# Patient Record
Sex: MALE | Race: WHITE | NOT HISPANIC OR LATINO | Employment: FULL TIME | URBAN - METROPOLITAN AREA
[De-identification: names, ages, dates, MRNs, and addresses within clinical notes are randomized per-mention and may not be internally consistent; named-entity substitution may affect disease eponyms.]

---

## 2023-06-01 LAB
ALANINE AMINOTRANSFERASE (SGPT) (U/L) IN SER/PLAS: 65 U/L (ref 10–52)
ALBUMIN (G/DL) IN SER/PLAS: 4.2 G/DL (ref 3.4–5)
ALKALINE PHOSPHATASE (U/L) IN SER/PLAS: 60 U/L (ref 33–136)
ANION GAP IN SER/PLAS: 12 MMOL/L (ref 10–20)
ASPARTATE AMINOTRANSFERASE (SGOT) (U/L) IN SER/PLAS: 50 U/L (ref 9–39)
BILIRUBIN TOTAL (MG/DL) IN SER/PLAS: 1.8 MG/DL (ref 0–1.2)
CALCIUM (MG/DL) IN SER/PLAS: 9.2 MG/DL (ref 8.6–10.3)
CARBON DIOXIDE, TOTAL (MMOL/L) IN SER/PLAS: 30 MMOL/L (ref 21–32)
CERULOPLASMIN (MG/DL) IN SER/PLAS: 18 MG/DL (ref 20–60)
CHLORIDE (MMOL/L) IN SER/PLAS: 100 MMOL/L (ref 98–107)
CREATININE (MG/DL) IN SER/PLAS: 1.02 MG/DL (ref 0.5–1.3)
ERYTHROCYTE DISTRIBUTION WIDTH (RATIO) BY AUTOMATED COUNT: 12.5 % (ref 11.5–14.5)
ERYTHROCYTE MEAN CORPUSCULAR HEMOGLOBIN CONCENTRATION (G/DL) BY AUTOMATED: 34 G/DL (ref 32–36)
ERYTHROCYTE MEAN CORPUSCULAR VOLUME (FL) BY AUTOMATED COUNT: 96 FL (ref 80–100)
ERYTHROCYTES (10*6/UL) IN BLOOD BY AUTOMATED COUNT: 5.07 X10E12/L (ref 4.5–5.9)
FERRITIN (UG/LL) IN SER/PLAS: 441 UG/L (ref 20–300)
GFR MALE: 83 ML/MIN/1.73M2
GLUCOSE (MG/DL) IN SER/PLAS: 72 MG/DL (ref 74–99)
HEMATOCRIT (%) IN BLOOD BY AUTOMATED COUNT: 48.5 % (ref 41–52)
HEMOGLOBIN (G/DL) IN BLOOD: 16.5 G/DL (ref 13.5–17.5)
HEPATITIS A TOTAL AB INTERPRETATION: REACTIVE
HEPATITIS B VIRUS SURFACE AB (MIU/ML) IN SERUM: <3.1 MIU/ML
HEPATITIS B VIRUS SURFACE AG PRESENCE IN SERUM: NONREACTIVE
HEPATITIS C VIRUS AB PRESENCE IN SERUM: NONREACTIVE
INR IN PPP BY COAGULATION ASSAY: 1.6 (ref 0.9–1.1)
IRON (UG/DL) IN SER/PLAS: 197 UG/DL (ref 35–150)
IRON BINDING CAPACITY (UG/DL) IN SER/PLAS: 315 UG/DL (ref 240–445)
IRON SATURATION (%) IN SER/PLAS: 63 % (ref 25–45)
LEUKOCYTES (10*3/UL) IN BLOOD BY AUTOMATED COUNT: 6.5 X10E9/L (ref 4.4–11.3)
PLATELETS (10*3/UL) IN BLOOD AUTOMATED COUNT: 236 X10E9/L (ref 150–450)
POTASSIUM (MMOL/L) IN SER/PLAS: 3.8 MMOL/L (ref 3.5–5.3)
PROTEIN TOTAL: 6.8 G/DL (ref 6.4–8.2)
PROTHROMBIN TIME (PT) IN PPP BY COAGULATION ASSAY: 19.1 SEC (ref 9.8–13.4)
SODIUM (MMOL/L) IN SER/PLAS: 138 MMOL/L (ref 136–145)
UREA NITROGEN (MG/DL) IN SER/PLAS: 30 MG/DL (ref 6–23)

## 2023-06-02 LAB
ANTI-NUCLEAR ANTIBODY (ANA): NEGATIVE
ANTI-SMOOTH MUSCLE ANTIBODY: NEGATIVE
MITOCHONDRIAL ANTIBODY: NEGATIVE

## 2023-06-05 LAB — HEMOCHROMATOSIS INTERPRETATION: NORMAL

## 2023-06-06 LAB
ALPHA-1 ANTITRYPSIN PHENOTYPE: NORMAL
ALPHA-1-ANTITRYPSIN (REF): 131 MG/DL (ref 90–200)

## 2023-08-29 PROBLEM — J45.909 ASTHMA (HHS-HCC): Status: ACTIVE | Noted: 2023-08-29

## 2023-08-29 PROBLEM — R60.0 BILATERAL EDEMA OF LOWER EXTREMITY: Status: ACTIVE | Noted: 2023-08-29

## 2023-08-29 PROBLEM — R20.0 RIGHT ARM NUMBNESS: Status: ACTIVE | Noted: 2023-08-29

## 2023-08-29 PROBLEM — R29.818 SUSPECTED SLEEP APNEA: Status: ACTIVE | Noted: 2023-08-29

## 2023-08-29 PROBLEM — R79.89 ELEVATED LIVER FUNCTION TESTS: Status: ACTIVE | Noted: 2023-08-29

## 2023-08-29 PROBLEM — D49.2 ABNORMAL SKIN GROWTH: Status: ACTIVE | Noted: 2023-08-29

## 2023-08-29 PROBLEM — R60.9 EDEMA: Status: ACTIVE | Noted: 2023-08-29

## 2023-08-29 PROBLEM — E29.1 TESTICULAR HYPOFUNCTION: Status: ACTIVE | Noted: 2023-08-29

## 2023-08-29 PROBLEM — I50.22 CHRONIC SYSTOLIC (CONGESTIVE) HEART FAILURE (MULTI): Status: ACTIVE | Noted: 2023-08-29

## 2023-08-29 PROBLEM — J45.901 ASTHMA EXACERBATION (HHS-HCC): Status: ACTIVE | Noted: 2023-08-29

## 2023-08-29 PROBLEM — I42.9 CARDIOMYOPATHY (MULTI): Status: ACTIVE | Noted: 2023-08-29

## 2023-08-29 PROBLEM — R17 ELEVATED BILIRUBIN: Status: ACTIVE | Noted: 2023-08-29

## 2023-08-29 PROBLEM — I48.91 ATRIAL FIBRILLATION (MULTI): Status: ACTIVE | Noted: 2023-08-29

## 2023-08-29 PROBLEM — R60.0 EDEMA OF RIGHT LOWER EXTREMITY: Status: ACTIVE | Noted: 2023-08-29

## 2023-08-29 PROBLEM — I49.9 IRREGULAR HEART RATE: Status: ACTIVE | Noted: 2023-08-29

## 2023-08-29 PROBLEM — M25.562 ARTHRALGIA OF LEFT KNEE: Status: ACTIVE | Noted: 2023-08-29

## 2023-08-29 PROBLEM — G56.03 BILATERAL CARPAL TUNNEL SYNDROME: Status: ACTIVE | Noted: 2023-08-29

## 2023-08-29 PROBLEM — R35.1 NOCTURIA: Status: ACTIVE | Noted: 2023-08-29

## 2023-08-29 PROBLEM — R94.31 ABNORMAL EKG: Status: ACTIVE | Noted: 2023-08-29

## 2023-08-29 PROBLEM — R06.02 SHORTNESS OF BREATH ON EXERTION: Status: ACTIVE | Noted: 2023-08-29

## 2023-08-29 RX ORDER — ALBUTEROL SULFATE 90 UG/1
AEROSOL, METERED RESPIRATORY (INHALATION)
COMMUNITY
Start: 2022-06-24

## 2023-08-29 RX ORDER — PANTOPRAZOLE SODIUM 40 MG/1
40 TABLET, DELAYED RELEASE ORAL DAILY
COMMUNITY
End: 2023-10-06 | Stop reason: ALTCHOICE

## 2023-08-29 RX ORDER — FUROSEMIDE 80 MG/1
1 TABLET ORAL DAILY
COMMUNITY
Start: 2022-07-14

## 2023-08-29 RX ORDER — IBUPROFEN 200 MG
4 TABLET ORAL DAILY
COMMUNITY

## 2023-08-29 RX ORDER — CARVEDILOL 12.5 MG/1
1 TABLET ORAL
COMMUNITY
Start: 2022-07-19 | End: 2023-10-06 | Stop reason: SDUPTHER

## 2023-08-29 RX ORDER — FLUTICASONE PROPIONATE AND SALMETEROL 100; 50 UG/1; UG/1
1 POWDER RESPIRATORY (INHALATION) 2 TIMES DAILY
COMMUNITY
Start: 2022-06-23 | End: 2023-10-06 | Stop reason: ALTCHOICE

## 2023-08-29 RX ORDER — ASPIRIN 81 MG/1
1 TABLET ORAL DAILY
COMMUNITY
Start: 2022-06-23

## 2023-08-29 RX ORDER — MONTELUKAST SODIUM 10 MG/1
1 TABLET ORAL NIGHTLY
COMMUNITY
Start: 2022-06-23 | End: 2023-10-06 | Stop reason: ALTCHOICE

## 2023-08-29 RX ORDER — LOSARTAN POTASSIUM 25 MG/1
25 TABLET ORAL DAILY
COMMUNITY
End: 2023-11-22 | Stop reason: SDUPTHER

## 2023-10-02 NOTE — PROGRESS NOTES
Counseling:  The patient was counseled regarding diagnostic results, instructions for management, risk factor reductions, prognosis, patient and family education, impressions, risks and benefits of treatment options and importance of compliance with treatment.      Chief Complaint:   The patient presents today for 4-month followup of a-fib and heart failure s/p echocardiogram.      History Of Present Illness:    Domingo Greer is a 62 year old male patient who presents today for 4-month followup of a-fib and heart failure s/p echocardiogram. 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 Echocardiogram performed 09/25/2023 demonstrated an EF of 45-50% and LV strain of -12.8%. Over the past 4 months, the patient states that he has done well from a cardiac standpoint. He denies any CP, chest discomfort, SOB, LE edema or palpitations. EKG today shows NSR with no acute changes.      Last Recorded Vitals:  Vitals:    10/06/23 1341   BP: 130/70   BP Location: Left arm   Pulse: 65   Weight: 118 kg (259 lb 3.2 oz)   Height: 1.829 m (6')       Past Surgical History:  He has a past surgical history that includes Rotator cuff repair (11/13/2013); Cholecystectomy (11/13/2013); Ankle surgery (11/13/2013); and Other surgical history (09/23/2022).      Social History:  He reports that he has quit smoking. His smoking use included cigarettes. He has never used smokeless tobacco. He reports current alcohol use of about 2.0 standard drinks of alcohol per week. He reports that he does not use drugs.    Family History:  Family History   Problem Relation Name Age of Onset    Hypertension Mother      Prostate cancer Father       Allergies:  Other and Penicillin    Outpatient Medications:  Current Outpatient Medications   Medication Instructions    albuterol 90 mcg/actuation inhaler inhale 1 to 2 puffs by mouth every 4  to 6 hours if needed    aspirin 81 mg EC tablet 1 tablet, oral, Daily    carvedilol (Coreg) 12.5 mg tablet 1 tablet, oral, 2 times daily with meals    furosemide (Lasix) 80 mg tablet 1 tablet, oral, Daily    ibuprofen 200 mg tablet 4 tablets, oral, Daily    losartan (COZAAR) 25 mg, oral, Daily     Review of Systems   All other systems reviewed and are negative.     Physical Exam:  Constitutional:       Appearance: Healthy appearance. Not in distress.   Neck:      Vascular: No JVR. JVD normal.   Pulmonary:      Effort: Pulmonary effort is normal.      Breath sounds: Normal breath sounds. No wheezing. No rhonchi. No rales.   Chest:      Chest wall: Not tender to palpatation.   Cardiovascular:      PMI at left midclavicular line. Normal rate. Regular rhythm. Normal S1. Normal S2.       Murmurs: There is no murmur.      No gallop.  No click. No rub.   Pulses:     Intact distal pulses.   Edema:     Peripheral edema absent.   Abdominal:      General: Bowel sounds are normal.      Palpations: Abdomen is soft.      Tenderness: There is no abdominal tenderness.   Musculoskeletal: Normal range of motion.         General: No tenderness. Skin:     General: Skin is warm and dry.   Neurological:      General: No focal deficit present.      Mental Status: Alert and oriented to person, place and time.       Last Labs:  CBC -  Lab Results   Component Value Date    WBC 6.5 06/01/2023    HGB 16.5 06/01/2023    HCT 48.5 06/01/2023    MCV 96 06/01/2023     06/01/2023       CMP -  Lab Results   Component Value Date    CALCIUM 9.2 06/01/2023    PROT 6.8 06/01/2023    ALBUMIN 4.2 06/01/2023    AST 50 (H) 06/01/2023    ALT 65 (H) 06/01/2023    ALKPHOS 60 06/01/2023    BILITOT 1.8 (H) 06/01/2023       LIPID PANEL -   Lab Results   Component Value Date    CHOL 135 06/23/2022    TRIG 49 06/23/2022    HDL 28.5 (A) 06/23/2022    CHHDL 4.7 06/23/2022    LDLF 97 06/23/2022    VLDL 10 06/23/2022       RENAL FUNCTION PANEL -   Lab Results    Component Value Date    GLUCOSE 72 (L) 06/01/2023     06/01/2023    K 3.8 06/01/2023     06/01/2023    CO2 30 06/01/2023    ANIONGAP 12 06/01/2023    BUN 30 (H) 06/01/2023    CREATININE 1.02 06/01/2023    GFRMALE 83 06/01/2023    CALCIUM 9.2 06/01/2023    ALBUMIN 4.2 06/01/2023        Lab Results   Component Value Date    HGBA1C 5.8 (A) 06/23/2022       Last Cardiology Tests:  09/25/2023 - TTE  1. Left ventricular systolic function is mildly decreased with a 45-50% estimated ejection fraction.  2. Moderately increased left ventricular septal thickness.  3. Left Ventricular Global Longitudinal Strain - 12.8 %.    10/21/2022 - Electrophysiology Procedure   RFA.    07/29/2022 - Cardiac Catheterization (LH/RH)  1. No evidence of coronary artery disease.  2. Left Ventricular end-diastolic pressure = 16.  3. Normal LV filling pressures.    07/18/2022 - TTE  1. The left ventricular systolic function is severely decreased with a 30-35% estimated ejection fraction.  2. Severely enlarged right ventricle.  3. The left atrium is moderate to severely dilated.  4. The right atrium is moderately dilated.  5. Mildly elevated RVSP.  6. Mild aortic valve stenosis.  7. The left ventricular cavity size is moderately dilated.  8. There is global hypokinesis of the left ventricle with minor regional variations.     Diagnostic review: I have personally reviewed the result(s) of the Echocardiogram.    Assessment/Plan   1) Atrial fibrillation s/p RFA Oct 2022  Echo Sept 2023 with LVEF 45-50%  ? rate related CMP  [id number] score is 2  On Coreg 25 mg BID (increased from 12.5 mg BID on 10/06/2023)  Message sent to Dr. Yeager Re: Xarelto   Xarelto subsequently discontinued   Denies palpitations   In NSR    2) Chronic systolic heart failure  Appears compensated on exam  On Lasix 80 mg daily, Coreg 12.5 mg BID, losartan 25 mg daily  Left heart cath with no significant CAD July 2022  If LVEF remains <35%, EP to consider ICD   TTE  09/25/2023 with LVEF 45-50%  Denies CP, chest discomfort, SOB or LE edema  Increase carvedilol to 25 mg BID  Repeat echo 6-8 weeks  Check CBC, CMP, BNP, Lipid Panel        Scribe Attestation  By signing my name below, I, Mary Bowman, Scribe   attest that this documentation has been prepared under the direction and in the presence of Venancio Brooks MD.

## 2023-10-02 NOTE — PATIENT INSTRUCTIONS
Please increase the carvedilol to 25 mg twice daily. If you have any of the 12.5 mg tablets left, you can take 2 tablets twice daily until they are finished. A prescription for the new dose has been sent to your pharmacy.  Continue all other medications as prescribed.  Repeat echocardiogram (ultrasound of the heart) in 2 months to followup on your heart function.  Please have blood work drawn at your earliest convenience.   Followup with Dr. Brooks in 3 months.     If you have any questions or cardiac concerns, please call our office at [phone].

## 2023-10-06 ENCOUNTER — OFFICE VISIT (OUTPATIENT)
Dept: CARDIOLOGY | Facility: CLINIC | Age: 63
End: 2023-10-06
Payer: COMMERCIAL

## 2023-10-06 VITALS
HEART RATE: 65 BPM | HEIGHT: 72 IN | BODY MASS INDEX: 35.11 KG/M2 | WEIGHT: 259.2 LBS | SYSTOLIC BLOOD PRESSURE: 130 MMHG | DIASTOLIC BLOOD PRESSURE: 70 MMHG

## 2023-10-06 DIAGNOSIS — I48.0 PAROXYSMAL ATRIAL FIBRILLATION (MULTI): ICD-10-CM

## 2023-10-06 DIAGNOSIS — I42.0 DILATED CARDIOMYOPATHY (MULTI): Primary | ICD-10-CM

## 2023-10-06 PROCEDURE — 1036F TOBACCO NON-USER: CPT | Performed by: INTERNAL MEDICINE

## 2023-10-06 PROCEDURE — 93000 ELECTROCARDIOGRAM COMPLETE: CPT | Performed by: INTERNAL MEDICINE

## 2023-10-06 PROCEDURE — 99213 OFFICE O/P EST LOW 20 MIN: CPT | Performed by: INTERNAL MEDICINE

## 2023-10-06 RX ORDER — CARVEDILOL 25 MG/1
25 TABLET ORAL
COMMUNITY
Start: 2023-09-28 | End: 2023-10-06 | Stop reason: ALTCHOICE

## 2023-10-06 RX ORDER — CARVEDILOL 25 MG/1
25 TABLET ORAL
Qty: 180 TABLET | Refills: 3 | Status: SHIPPED | OUTPATIENT
Start: 2023-10-06 | End: 2023-11-24 | Stop reason: SDUPTHER

## 2023-10-06 ASSESSMENT — ENCOUNTER SYMPTOMS
DEPRESSION: 0
LOSS OF SENSATION IN FEET: 1
OCCASIONAL FEELINGS OF UNSTEADINESS: 0

## 2023-10-06 NOTE — LETTER
October 6, 2023     FATUMA Carlson-CNP  [address] [facility] [address]    Patient: Domingo Greer   YOB: 1960   Date of Visit: 10/6/2023       Dear Dr. India Villagran, FATUMA-CNP:    Thank you for referring Domingo Greer to me for evaluation. Below are my notes for this consultation.  If you have questions, please do not hesitate to call me. I look forward to following your patient along with you.       Sincerely,     Venancio Brooks MD      CC: No Recipients  ______________________________________________________________________________________    Counseling:  The patient was counseled regarding diagnostic results, instructions for management, risk factor reductions, prognosis, patient and family education, impressions, risks and benefits of treatment options and importance of compliance with treatment.      Chief Complaint:   The patient presents today for 4-month followup of a-fib and heart failure s/p echocardiogram.      History Of Present Illness:    Domingo Greer is a 62 year old male patient who presents today for 4-month followup of a-fib and heart failure s/p echocardiogram. 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 Echocardiogram performed 09/25/2023 demonstrated an EF of 45-50% and LV strain of -12.8%. Over the past 4 months, the patient states that he has done well from a cardiac standpoint. He denies any CP, chest discomfort, SOB, LE edema or palpitations. EKG today shows NSR with no acute changes.      Last Recorded Vitals:  Vitals:    10/06/23 1341   BP: 130/70   BP Location: Left arm   Pulse: 65   Weight: 118 kg (259 lb 3.2 oz)   Height: 1.829 m (6')       Past Surgical History:  He has a past surgical history that includes Rotator cuff repair (11/13/2013); Cholecystectomy (11/13/2013); Ankle surgery (11/13/2013); and Other surgical  history (09/23/2022).      Social History:  He reports that he has quit smoking. His smoking use included cigarettes. He has never used smokeless tobacco. He reports current alcohol use of about 2.0 standard drinks of alcohol per week. He reports that he does not use drugs.    Family History:  Family History   Problem Relation Name Age of Onset   • Hypertension Mother     • Prostate cancer Father       Allergies:  Other and Penicillin    Outpatient Medications:  Current Outpatient Medications   Medication Instructions   • albuterol 90 mcg/actuation inhaler inhale 1 to 2 puffs by mouth every 4 to 6 hours if needed   • aspirin 81 mg EC tablet 1 tablet, oral, Daily   • carvedilol (Coreg) 12.5 mg tablet 1 tablet, oral, 2 times daily with meals   • furosemide (Lasix) 80 mg tablet 1 tablet, oral, Daily   • ibuprofen 200 mg tablet 4 tablets, oral, Daily   • losartan (COZAAR) 25 mg, oral, Daily     Review of Systems   All other systems reviewed and are negative.     Physical Exam:  Constitutional:       Appearance: Healthy appearance. Not in distress.   Neck:      Vascular: No JVR. JVD normal.   Pulmonary:      Effort: Pulmonary effort is normal.      Breath sounds: Normal breath sounds. No wheezing. No rhonchi. No rales.   Chest:      Chest wall: Not tender to palpatation.   Cardiovascular:      PMI at left midclavicular line. Normal rate. Regular rhythm. Normal S1. Normal S2.       Murmurs: There is no murmur.      No gallop.  No click. No rub.   Pulses:     Intact distal pulses.   Edema:     Peripheral edema absent.   Abdominal:      General: Bowel sounds are normal.      Palpations: Abdomen is soft.      Tenderness: There is no abdominal tenderness.   Musculoskeletal: Normal range of motion.         General: No tenderness. Skin:     General: Skin is warm and dry.   Neurological:      General: No focal deficit present.      Mental Status: Alert and oriented to person, place and time.       Last Labs:  CBC -  Lab Results    Component Value Date    WBC 6.5 06/01/2023    HGB 16.5 06/01/2023    HCT 48.5 06/01/2023    MCV 96 06/01/2023     06/01/2023       CMP -  Lab Results   Component Value Date    CALCIUM 9.2 06/01/2023    PROT 6.8 06/01/2023    ALBUMIN 4.2 06/01/2023    AST 50 (H) 06/01/2023    ALT 65 (H) 06/01/2023    ALKPHOS 60 06/01/2023    BILITOT 1.8 (H) 06/01/2023       LIPID PANEL -   Lab Results   Component Value Date    CHOL 135 06/23/2022    TRIG 49 06/23/2022    HDL 28.5 (A) 06/23/2022    CHHDL 4.7 06/23/2022    LDLF 97 06/23/2022    VLDL 10 06/23/2022       RENAL FUNCTION PANEL -   Lab Results   Component Value Date    GLUCOSE 72 (L) 06/01/2023     06/01/2023    K 3.8 06/01/2023     06/01/2023    CO2 30 06/01/2023    ANIONGAP 12 06/01/2023    BUN 30 (H) 06/01/2023    CREATININE 1.02 06/01/2023    GFRMALE 83 06/01/2023    CALCIUM 9.2 06/01/2023    ALBUMIN 4.2 06/01/2023        Lab Results   Component Value Date    HGBA1C 5.8 (A) 06/23/2022       Last Cardiology Tests:  09/25/2023 - TTE  1. Left ventricular systolic function is mildly decreased with a 45-50% estimated ejection fraction.  2. Moderately increased left ventricular septal thickness.  3. Left Ventricular Global Longitudinal Strain - 12.8 %.    10/21/2022 - Electrophysiology Procedure   RFA.    07/29/2022 - Cardiac Catheterization (LH/RH)  1. No evidence of coronary artery disease.  2. Left Ventricular end-diastolic pressure = 16.  3. Normal LV filling pressures.    07/18/2022 - TTE  1. The left ventricular systolic function is severely decreased with a 30-35% estimated ejection fraction.  2. Severely enlarged right ventricle.  3. The left atrium is moderate to severely dilated.  4. The right atrium is moderately dilated.  5. Mildly elevated RVSP.  6. Mild aortic valve stenosis.  7. The left ventricular cavity size is moderately dilated.  8. There is global hypokinesis of the left ventricle with minor regional variations.     Diagnostic review:  I have personally reviewed the result(s) of the Echocardiogram.    Assessment/Plan  1) Atrial fibrillation s/p RFA Oct 2022  Echo Sept 2023 with LVEF 45-50%  ? rate related CMP  [id number] score is 2  On Coreg 25 mg BID (increased from 12.5 mg BID on 10/06/2023)  Message sent to Dr. Yeager Re: Xarelto   Xarelto subsequently discontinued   Denies palpitations   In NSR    2) Chronic systolic heart failure  Appears compensated on exam  On Lasix 80 mg daily, Coreg 12.5 mg BID, losartan 25 mg daily  Left heart cath with no significant CAD July 2022  If LVEF remains <35%, EP to consider ICD   TTE 09/25/2023 with LVEF 45-50%  Denies CP, chest discomfort, SOB or LE edema  Increase carvedilol to 25 mg BID  Repeat echo 6-8 weeks  Check CBC, CMP, BNP, Lipid Panel        Scribe Attestation  By signing my name below, I, Mary Bowman Scribe   attest that this documentation has been prepared under the direction and in the presence of Venancio Brooks MD.

## 2023-10-06 NOTE — LETTER
October 6, 2023     FATUMA Carlson-CNP  [address] [facility] [id number]    Patient: Domingo Greer   YOB: 1960   Date of Visit: 10/6/2023       Dear Dr. India Villagran, FATUMA-CNP:    Thank you for referring Domingo Greer to me for evaluation. Below are my notes for this consultation.  If you have questions, please do not hesitate to call me. I look forward to following your patient along with you.       Sincerely,     Venancio Brooks MD      CC: No Recipients  ______________________________________________________________________________________    Counseling:  The patient was counseled regarding diagnostic results, instructions for management, risk factor reductions, prognosis, patient and family education, impressions, risks and benefits of treatment options and importance of compliance with treatment.      Chief Complaint:   The patient presents today for 4-month followup of a-fib and heart failure s/p echocardiogram.      History Of Present Illness:    Domingo Greer is a 62 year old male patient who presents today for 4-month followup of a-fib and heart failure s/p echocardiogram. 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 Echocardiogram performed 09/25/2023 demonstrated an EF of 45-50% and LV strain of -12.8%. Over the past 4 months, the patient states that he has done well from a cardiac standpoint. He denies any CP, chest discomfort, SOB, LE edema or palpitations. EKG today shows NSR with no acute changes.      Last Recorded Vitals:  Vitals:    10/06/23 1341   BP: 130/70   BP Location: Left arm   Pulse: 65   Weight: 118 kg (259 lb 3.2 oz)   Height: 1.829 m (6')       Past Surgical History:  He has a past surgical history that includes Rotator cuff repair (11/13/2013); Cholecystectomy (11/13/2013); Ankle surgery (11/13/2013); and Other surgical  history (09/23/2022).      Social History:  He reports that he has quit smoking. His smoking use included cigarettes. He has never used smokeless tobacco. He reports current alcohol use of about 2.0 standard drinks of alcohol per week. He reports that he does not use drugs.    Family History:  Family History   Problem Relation Name Age of Onset   • Hypertension Mother     • Prostate cancer Father       Allergies:  Other and Penicillin    Outpatient Medications:  Current Outpatient Medications   Medication Instructions   • albuterol 90 mcg/actuation inhaler inhale 1 to 2 puffs by mouth every 4 to 6 hours if needed   • aspirin 81 mg EC tablet 1 tablet, oral, Daily   • carvedilol (Coreg) 12.5 mg tablet 1 tablet, oral, 2 times daily with meals   • furosemide (Lasix) 80 mg tablet 1 tablet, oral, Daily   • ibuprofen 200 mg tablet 4 tablets, oral, Daily   • losartan (COZAAR) 25 mg, oral, Daily     Review of Systems   All other systems reviewed and are negative.     Physical Exam:  Constitutional:       Appearance: Healthy appearance. Not in distress.   Neck:      Vascular: No JVR. JVD normal.   Pulmonary:      Effort: Pulmonary effort is normal.      Breath sounds: Normal breath sounds. No wheezing. No rhonchi. No rales.   Chest:      Chest wall: Not tender to palpatation.   Cardiovascular:      PMI at left midclavicular line. Normal rate. Regular rhythm. Normal S1. Normal S2.       Murmurs: There is no murmur.      No gallop.  No click. No rub.   Pulses:     Intact distal pulses.   Edema:     Peripheral edema absent.   Abdominal:      General: Bowel sounds are normal.      Palpations: Abdomen is soft.      Tenderness: There is no abdominal tenderness.   Musculoskeletal: Normal range of motion.         General: No tenderness. Skin:     General: Skin is warm and dry.   Neurological:      General: No focal deficit present.      Mental Status: Alert and oriented to person, place and time.       Last Labs:  CBC -  Lab Results    Component Value Date    WBC 6.5 06/01/2023    HGB 16.5 06/01/2023    HCT 48.5 06/01/2023    MCV 96 06/01/2023     06/01/2023       CMP -  Lab Results   Component Value Date    CALCIUM 9.2 06/01/2023    PROT 6.8 06/01/2023    ALBUMIN 4.2 06/01/2023    AST 50 (H) 06/01/2023    ALT 65 (H) 06/01/2023    ALKPHOS 60 06/01/2023    BILITOT 1.8 (H) 06/01/2023       LIPID PANEL -   Lab Results   Component Value Date    CHOL 135 06/23/2022    TRIG 49 06/23/2022    HDL 28.5 (A) 06/23/2022    CHHDL 4.7 06/23/2022    LDLF 97 06/23/2022    VLDL 10 06/23/2022       RENAL FUNCTION PANEL -   Lab Results   Component Value Date    GLUCOSE 72 (L) 06/01/2023     06/01/2023    K 3.8 06/01/2023     06/01/2023    CO2 30 06/01/2023    ANIONGAP 12 06/01/2023    BUN 30 (H) 06/01/2023    CREATININE 1.02 06/01/2023    GFRMALE 83 06/01/2023    CALCIUM 9.2 06/01/2023    ALBUMIN 4.2 06/01/2023        Lab Results   Component Value Date    HGBA1C 5.8 (A) 06/23/2022       Last Cardiology Tests:  09/25/2023 - TTE  1. Left ventricular systolic function is mildly decreased with a 45-50% estimated ejection fraction.  2. Moderately increased left ventricular septal thickness.  3. Left Ventricular Global Longitudinal Strain - 12.8 %.    10/21/2022 - Electrophysiology Procedure   RFA.    07/29/2022 - Cardiac Catheterization (LH/RH)  1. No evidence of coronary artery disease.  2. Left Ventricular end-diastolic pressure = 16.  3. Normal LV filling pressures.    07/18/2022 - TTE  1. The left ventricular systolic function is severely decreased with a 30-35% estimated ejection fraction.  2. Severely enlarged right ventricle.  3. The left atrium is moderate to severely dilated.  4. The right atrium is moderately dilated.  5. Mildly elevated RVSP.  6. Mild aortic valve stenosis.  7. The left ventricular cavity size is moderately dilated.  8. There is global hypokinesis of the left ventricle with minor regional variations.     Diagnostic review:  I have personally reviewed the result(s) of the Echocardiogram.    Assessment/Plan  1) Atrial fibrillation s/p RFA Oct 2022  Echo Sept 2023 with LVEF 45-50%  ? rate related CMP  [id number] score is 2  On Coreg 25 mg BID (increased from 12.5 mg BID on 10/06/2023)  Message sent to Dr. Yeager Re: Xarelto   Xarelto subsequently discontinued   Denies palpitations   In NSR    2) Chronic systolic heart failure  Appears compensated on exam  On Lasix 80 mg daily, Coreg 12.5 mg BID, losartan 25 mg daily  Left heart cath with no significant CAD July 2022  If LVEF remains <35%, EP to consider ICD   TTE 09/25/2023 with LVEF 45-50%  Denies CP, chest discomfort, SOB or LE edema  Increase carvedilol to 25 mg BID  Repeat echo 6-8 weeks  Check CBC, CMP, BNP, Lipid Panel        Scribe Attestation  By signing my name below, I, Mary Bowman Scribe   attest that this documentation has been prepared under the direction and in the presence of Venancio Brooks MD.

## 2023-11-09 ENCOUNTER — HOSPITAL ENCOUNTER (OUTPATIENT)
Dept: CARDIOLOGY | Facility: HOSPITAL | Age: 63
Discharge: HOME | End: 2023-11-09
Payer: COMMERCIAL

## 2023-11-09 DIAGNOSIS — I42.0 DILATED CARDIOMYOPATHY (MULTI): ICD-10-CM

## 2023-11-09 DIAGNOSIS — I48.0 PAROXYSMAL ATRIAL FIBRILLATION (MULTI): ICD-10-CM

## 2023-11-09 LAB
EJECTION FRACTION APICAL 4 CHAMBER: 60
EJECTION FRACTION: 59
LEFT VENTRICLE INTERNAL DIMENSION DIASTOLE: 5.82 (ref 3.5–6)

## 2023-11-09 PROCEDURE — 93308 TTE F-UP OR LMTD: CPT

## 2023-11-09 PROCEDURE — 93308 TTE F-UP OR LMTD: CPT | Performed by: INTERNAL MEDICINE

## 2023-11-13 ENCOUNTER — LAB (OUTPATIENT)
Dept: LAB | Facility: LAB | Age: 63
End: 2023-11-13
Payer: COMMERCIAL

## 2023-11-13 DIAGNOSIS — R97.20 ELEVATED PSA: ICD-10-CM

## 2023-11-13 DIAGNOSIS — R97.20 ELEVATED PSA: Primary | ICD-10-CM

## 2023-11-13 LAB — PSA SERPL-MCNC: 4.83 NG/ML

## 2023-11-13 PROCEDURE — 84153 ASSAY OF PSA TOTAL: CPT

## 2023-11-13 PROCEDURE — 36415 COLL VENOUS BLD VENIPUNCTURE: CPT

## 2023-11-16 ENCOUNTER — OFFICE VISIT (OUTPATIENT)
Dept: UROLOGY | Facility: HOSPITAL | Age: 63
End: 2023-11-16
Payer: COMMERCIAL

## 2023-11-16 VITALS — DIASTOLIC BLOOD PRESSURE: 94 MMHG | SYSTOLIC BLOOD PRESSURE: 159 MMHG | HEART RATE: 70 BPM

## 2023-11-16 DIAGNOSIS — R97.20 ELEVATED PSA: Primary | ICD-10-CM

## 2023-11-16 PROCEDURE — 99214 OFFICE O/P EST MOD 30 MIN: CPT | Performed by: STUDENT IN AN ORGANIZED HEALTH CARE EDUCATION/TRAINING PROGRAM

## 2023-11-16 PROCEDURE — 1036F TOBACCO NON-USER: CPT | Performed by: STUDENT IN AN ORGANIZED HEALTH CARE EDUCATION/TRAINING PROGRAM

## 2023-11-16 ASSESSMENT — PAIN SCALES - GENERAL: PAINLEVEL: 0-NO PAIN

## 2023-11-16 NOTE — PROGRESS NOTES
Subjective   Patient ID: Domingo Greer is a 62 y.o. male who presents for No chief complaint on file..  HPI  Patient presents to follow up regarding elevated PSA. His most recent PSA on 11/13/2023 was 4.83, prior in June 2023 was 4.1, prior on 6/23/2022 was 3.11. He denies any urinary symptoms of dysuria, hematuria, frequency or urgency.     Patient has metal in the form of a plate and a few screws in his foot since he was 20 but notes that he has had an MRI of the knee and shoulders in the past.     Review of Systems  All systems were reviewed. Anything negative was noted in the HPI.    Objective   Physical Exam  Constitutional:       General: He is not in acute distress.     Appearance: He is well-developed.   HENT:      Right Ear: External ear normal.      Mouth/Throat:      Pharynx: Oropharynx is clear.   Eyes:      General: No scleral icterus.     Conjunctiva/sclera: Conjunctivae normal.      Right eye: Right conjunctiva is not injected.      Left eye: Left conjunctiva is not injected.   Pulmonary:      Effort: Pulmonary effort is normal. No respiratory distress.      Breath sounds: Normal breath sounds.   Abdominal:      General: Abdomen is flat. Bowel sounds are normal.      Palpations: Abdomen is soft.   Musculoskeletal:      Right lower leg: No edema.      Left lower leg: No edema.   Neurological:      Mental Status: He is alert and oriented to person, place, and time.      Motor: Motor function is intact.      Gait: Gait is intact.   Psychiatric:         Attention and Perception: Attention normal. He does not perceive visual hallucinations.         Mood and Affect: Affect normal.         Speech: Speech normal.         Behavior: Behavior normal. Behavior is cooperative.         Judgment: Judgment normal.       Past Medical History:   Diagnosis Date    Cellulitis, unspecified     Cellulitis    Personal history of other diseases of the circulatory system 08/16/2022    History of peripheral vascular disease        Past Surgical History:   Procedure Laterality Date    ANKLE SURGERY  11/13/2013    Ankle Surgery    CHOLECYSTECTOMY  11/13/2013    Cholecystectomy    OTHER SURGICAL HISTORY  09/23/2022    Vascular surgical procedure    ROTATOR CUFF REPAIR  11/13/2013    Rotator Cuff Repair     Component      Latest Ref Rng 6/23/2022 11/13/2023   PSA      <=4.00 ng/mL 3.11  4.83 (H)       Legend:  (H) High    Assessment/Plan   There are no diagnoses linked to this encounter.    - Elevated PSA:  We had a very long and extensive discussion with the patient regarding elevated PSA. I explained to him the pathophysiology, differential diagnosis, risk factor, associated conditions, and management. I explained to him that elevated PSA could be either due to BPH, prostate infection or inflammation or prostate adenocarcinoma. We discussed the need to do a work-up to rule out any underlying prostate adenocarcinoma in the form of MR fusion biopsy if his MRI is positive or regular TRUS biopsy of the MRI is negative or we cannot do an MRI of the prostate. We discussed at length the risk, benefit, potential complication, adverse events of prostate biopsy including pain, discomfort, urinary retention, hematuria, pneumaturia, hematospermia. Explained to him that there is a 2% to 5% risk of complicated urinary infection and urosepsis. Explained to him indicates it happens, he will need to be admitted for IV antibiotic for 2 to 3 days at the hospital.    Plan:  Prostate MRI    Follow up: Pending imaging    Scribed for Dr. Dino Gaspar by Sirisha Saucedo, medical scribe, on 11/16/23 at 8:00 AM

## 2023-11-22 DIAGNOSIS — I42.0 DILATED CARDIOMYOPATHY (MULTI): ICD-10-CM

## 2023-11-22 DIAGNOSIS — I48.0 PAROXYSMAL ATRIAL FIBRILLATION (MULTI): ICD-10-CM

## 2023-11-22 RX ORDER — LOSARTAN POTASSIUM 25 MG/1
25 TABLET ORAL DAILY
Qty: 90 TABLET | Refills: 3 | Status: SHIPPED | OUTPATIENT
Start: 2023-11-22 | End: 2024-11-21

## 2023-11-22 NOTE — TELEPHONE ENCOUNTER
----- Message from Martha Washington sent at 11/22/2023 10:57 AM EST -----  Patient called for refill of Carvedilol/Coreg 25 mg Twice daily and Losartan.  Please send to [facility] - [place].   Thanks

## 2023-11-22 NOTE — TELEPHONE ENCOUNTER
Last appointment: 10/06/23 with Dr. Brooks  Next appointment: 1/8/24 with Dr. Brooks  Labs labs: 6/1/23 CMP-  Losartan last sent 11/21/22  Saw Erika Deng 11/2022    **Carvedilol 25 mg refill was sent 10/6/23 with 3 refills-I called pharmacy and they have 25mg Carvedilol but patient said he is now taking 50 mg BID. I called the patient and confirmed he is taking Carvedilol 50 mg BID, and reports he was told to increase to 50 mg BID(but note states he was on 12.5mg increased to 25 mg BID). He denies fatigue and hasn't had any side effects. Hasn't checked BP/HR at home. He was in urology office 11/16/23 and BP/HR checked and it was high because he said he just ran up 2 flights of stairs. 159/94 HR 70. Please advise on Carvedilol dose if he should continue this or decrease back to 25 mg BID which is what he was actually taking at appointment. Thanks

## 2023-11-24 DIAGNOSIS — I48.0 PAROXYSMAL ATRIAL FIBRILLATION (MULTI): ICD-10-CM

## 2023-11-24 DIAGNOSIS — I42.0 DILATED CARDIOMYOPATHY (MULTI): ICD-10-CM

## 2023-11-24 RX ORDER — CARVEDILOL 25 MG/1
25 TABLET ORAL
Qty: 180 TABLET | Refills: 3 | Status: CANCELLED | OUTPATIENT
Start: 2023-11-24 | End: 2024-11-23

## 2023-11-24 RX ORDER — CARVEDILOL 25 MG/1
25 TABLET ORAL
Qty: 180 TABLET | Refills: 3 | Status: SHIPPED | OUTPATIENT
Start: 2023-11-24 | End: 2024-06-04 | Stop reason: SDUPTHER

## 2023-11-24 NOTE — TELEPHONE ENCOUNTER
Patient calling in stating that he is now out of his carvedilol he has been taking two of the 25 mg pills twice a day. Thought that he was supposed to increase his dose to 50 mg BID.   Confirmed dose of carvedilol with Dr Brooks.  Called patient back and told him that Dr Brooks did not increase dose to 50 mg BID he wants him to take 25 mg BID.

## 2023-11-27 NOTE — TELEPHONE ENCOUNTER
Dr. Brooks sent over script on 11/24/23. I called pharmacy and they used a GoodRx Card for patient since he ran out of script before it is due to be refill again with insurance. Script is being filled currently.

## 2023-12-07 ENCOUNTER — HOSPITAL ENCOUNTER (OUTPATIENT)
Dept: RADIOLOGY | Facility: HOSPITAL | Age: 63
Discharge: HOME | End: 2023-12-07
Payer: COMMERCIAL

## 2023-12-07 DIAGNOSIS — R97.20 ELEVATED PSA: ICD-10-CM

## 2023-12-07 PROCEDURE — 76498 UNLISTED MR PROCEDURE: CPT | Performed by: RADIOLOGY

## 2023-12-07 PROCEDURE — 2550000001 HC RX 255 CONTRASTS: Performed by: STUDENT IN AN ORGANIZED HEALTH CARE EDUCATION/TRAINING PROGRAM

## 2023-12-07 PROCEDURE — 72197 MRI PELVIS W/O & W/DYE: CPT

## 2023-12-07 PROCEDURE — A9575 INJ GADOTERATE MEGLUMI 0.1ML: HCPCS | Performed by: STUDENT IN AN ORGANIZED HEALTH CARE EDUCATION/TRAINING PROGRAM

## 2023-12-07 PROCEDURE — 72197 MRI PELVIS W/O & W/DYE: CPT | Performed by: RADIOLOGY

## 2023-12-07 RX ORDER — GADOTERATE MEGLUMINE 376.9 MG/ML
0.2 INJECTION INTRAVENOUS
Status: COMPLETED | OUTPATIENT
Start: 2023-12-07 | End: 2023-12-07

## 2023-12-07 RX ADMIN — GADOTERATE MEGLUMINE 2 ML: 376.9 INJECTION INTRAVENOUS at 07:41

## 2023-12-07 RX ADMIN — GADOTERATE MEGLUMINE 20 ML: 376.9 INJECTION INTRAVENOUS at 07:41

## 2023-12-12 ENCOUNTER — PREP FOR PROCEDURE (OUTPATIENT)
Dept: UROLOGY | Facility: HOSPITAL | Age: 63
End: 2023-12-12

## 2023-12-12 ENCOUNTER — OFFICE VISIT (OUTPATIENT)
Dept: UROLOGY | Facility: HOSPITAL | Age: 63
End: 2023-12-12
Payer: COMMERCIAL

## 2023-12-12 DIAGNOSIS — R97.20 ELEVATED PSA: Primary | ICD-10-CM

## 2023-12-12 DIAGNOSIS — R97.20 PSA ELEVATION: Primary | ICD-10-CM

## 2023-12-12 PROCEDURE — 99214 OFFICE O/P EST MOD 30 MIN: CPT | Performed by: STUDENT IN AN ORGANIZED HEALTH CARE EDUCATION/TRAINING PROGRAM

## 2023-12-12 PROCEDURE — 1036F TOBACCO NON-USER: CPT | Performed by: STUDENT IN AN ORGANIZED HEALTH CARE EDUCATION/TRAINING PROGRAM

## 2023-12-12 RX ORDER — CIPROFLOXACIN 2 MG/ML
400 INJECTION, SOLUTION INTRAVENOUS ONCE
Status: CANCELLED | OUTPATIENT
Start: 2023-12-12 | End: 2023-12-12

## 2023-12-12 RX ORDER — SODIUM CHLORIDE 9 MG/ML
100 INJECTION, SOLUTION INTRAVENOUS CONTINUOUS
Status: CANCELLED | OUTPATIENT
Start: 2023-12-12

## 2023-12-12 ASSESSMENT — PAIN SCALES - GENERAL: PAINLEVEL: 0-NO PAIN

## 2023-12-12 NOTE — PROGRESS NOTES
Subjective   Patient ID: Domingo Greer is a 63 y.o. male who presents for  MRI prostate results.  HPI  Prostate MRI done on 11/16/23.  Blood work done on 11/13/23 revealed a PSA of 4.83.  Review of Systems  All systems were reviewed. Anything negative was noted in the HPI.    Objective   Physical Exam  Physical Exam  Constitutional:       Appearance: Normal appearance.   HENT:      Head: Normocephalic and atraumatic.      Right Ear: External ear normal.      Mouth/Throat:      Pharynx: Oropharynx is clear.   Eyes:      Extraocular Movements: Extraocular movements intact.      Conjunctiva/sclera: Conjunctivae normal.      Pupils: Pupils are equal, round, and reactive to light.   Pulmonary:      Effort: Pulmonary effort is normal.   Abdominal:      General: Abdomen is flat. Bowel sounds are normal.      Palpations: Abdomen is soft.   Skin:     General: Skin is warm and dry.   Neurological:      General: No focal deficit present.      Mental Status: She is alert.   Psychiatric:         Mood and Affect: Mood normal.         Behavior: Behavior normal.         Thought Content: Thought content normal.         Judgment: Judgment normal.     Assessment/Plan   Problem List Items Addressed This Visit    None       We had a very long and extensive discussion with the patient regarding the pathophysiology, differential diagnosis, risk factor, management, natural history, incidence and diagnostic work-up of the condition.  We had a very long and extensive discussion with the patient regarding elevated PSA. I explained to him the pathophysiology, differential diagnosis, risk factor, associated conditions, and management. I explained to him that elevated PSA could be either due to BPH, prostate infection or inflammation or prostate adenocarcinoma. We discussed the need to do a work-up to rule out any underlying prostate adenocarcinoma in the form of MR fusion biopsy if his MRI is positive or regular TRUS biopsy of the MRI is  negative or we cannot do an MRI of the prostate. We discussed at length the risk, benefit, potential complication, adverse events of prostate biopsy including pain, discomfort, urinary retention, hematuria, pneumaturia, hematospermia. Explained to him that there is a 2% to 5% risk of complicated urinary infection and urosepsis. Explained to him indicates it happens, he will need to be admitted for IV antibiotic for 2 to 3 days at the hospital.    Plan:  Schedule prostate biopsy for 1/5/24.  Follow up 2 weeks after biopsy.     Scribe Attestation  By signing my name below, I, Mikhail Dodson   attest that this documentation has been prepared under the direction and in the presence of Dino Gaspar MD MPH.      Ghada Diego 12/11/23 10:54 PM

## 2023-12-20 ENCOUNTER — LAB (OUTPATIENT)
Dept: LAB | Facility: LAB | Age: 63
End: 2023-12-20
Payer: COMMERCIAL

## 2023-12-20 DIAGNOSIS — R97.20 ELEVATED PSA: ICD-10-CM

## 2023-12-20 PROCEDURE — 87086 URINE CULTURE/COLONY COUNT: CPT

## 2023-12-22 LAB — BACTERIA UR CULT: NORMAL

## 2024-01-02 DIAGNOSIS — Z79.2 PROPHYLACTIC ANTIBIOTIC: Primary | ICD-10-CM

## 2024-01-02 RX ORDER — CIPROFLOXACIN 500 MG/1
500 TABLET ORAL 2 TIMES DAILY
Qty: 6 TABLET | Refills: 0 | Status: SHIPPED | OUTPATIENT
Start: 2024-01-02 | End: 2024-01-08

## 2024-01-05 ENCOUNTER — HOSPITAL ENCOUNTER (OUTPATIENT)
Facility: HOSPITAL | Age: 64
Setting detail: OUTPATIENT SURGERY
Discharge: HOME | End: 2024-01-05
Attending: STUDENT IN AN ORGANIZED HEALTH CARE EDUCATION/TRAINING PROGRAM | Admitting: STUDENT IN AN ORGANIZED HEALTH CARE EDUCATION/TRAINING PROGRAM
Payer: COMMERCIAL

## 2024-01-05 ENCOUNTER — ANESTHESIA (OUTPATIENT)
Dept: OPERATING ROOM | Facility: HOSPITAL | Age: 64
End: 2024-01-05
Payer: COMMERCIAL

## 2024-01-05 ENCOUNTER — ANESTHESIA EVENT (OUTPATIENT)
Dept: OPERATING ROOM | Facility: HOSPITAL | Age: 64
End: 2024-01-05
Payer: COMMERCIAL

## 2024-01-05 VITALS
TEMPERATURE: 97.2 F | SYSTOLIC BLOOD PRESSURE: 107 MMHG | HEIGHT: 72 IN | OXYGEN SATURATION: 95 % | HEART RATE: 61 BPM | BODY MASS INDEX: 35.47 KG/M2 | DIASTOLIC BLOOD PRESSURE: 63 MMHG | RESPIRATION RATE: 15 BRPM | WEIGHT: 261.91 LBS

## 2024-01-05 DIAGNOSIS — R97.20 PSA ELEVATION: Primary | ICD-10-CM

## 2024-01-05 PROCEDURE — 3700000001 HC GENERAL ANESTHESIA TIME - INITIAL BASE CHARGE: Performed by: STUDENT IN AN ORGANIZED HEALTH CARE EDUCATION/TRAINING PROGRAM

## 2024-01-05 PROCEDURE — 76872 US TRANSRECTAL: CPT | Performed by: STUDENT IN AN ORGANIZED HEALTH CARE EDUCATION/TRAINING PROGRAM

## 2024-01-05 PROCEDURE — 2500000004 HC RX 250 GENERAL PHARMACY W/ HCPCS (ALT 636 FOR OP/ED): Performed by: NURSE ANESTHETIST, CERTIFIED REGISTERED

## 2024-01-05 PROCEDURE — 2720000007 HC OR 272 NO HCPCS: Performed by: STUDENT IN AN ORGANIZED HEALTH CARE EDUCATION/TRAINING PROGRAM

## 2024-01-05 PROCEDURE — 7100000010 HC PHASE TWO TIME - EACH INCREMENTAL 1 MINUTE: Performed by: STUDENT IN AN ORGANIZED HEALTH CARE EDUCATION/TRAINING PROGRAM

## 2024-01-05 PROCEDURE — 2500000004 HC RX 250 GENERAL PHARMACY W/ HCPCS (ALT 636 FOR OP/ED): Performed by: STUDENT IN AN ORGANIZED HEALTH CARE EDUCATION/TRAINING PROGRAM

## 2024-01-05 PROCEDURE — 88305 TISSUE EXAM BY PATHOLOGIST: CPT | Performed by: STUDENT IN AN ORGANIZED HEALTH CARE EDUCATION/TRAINING PROGRAM

## 2024-01-05 PROCEDURE — 55700 PR PROSTATE NEEDLE BIOPSY ANY APPROACH: CPT | Performed by: STUDENT IN AN ORGANIZED HEALTH CARE EDUCATION/TRAINING PROGRAM

## 2024-01-05 PROCEDURE — 76942 ECHO GUIDE FOR BIOPSY: CPT | Performed by: STUDENT IN AN ORGANIZED HEALTH CARE EDUCATION/TRAINING PROGRAM

## 2024-01-05 PROCEDURE — 88344 IMHCHEM/IMCYTCHM EA MLT ANTB: CPT | Performed by: STUDENT IN AN ORGANIZED HEALTH CARE EDUCATION/TRAINING PROGRAM

## 2024-01-05 PROCEDURE — 3700000002 HC GENERAL ANESTHESIA TIME - EACH INCREMENTAL 1 MINUTE: Performed by: STUDENT IN AN ORGANIZED HEALTH CARE EDUCATION/TRAINING PROGRAM

## 2024-01-05 PROCEDURE — 88305 TISSUE EXAM BY PATHOLOGIST: CPT | Mod: TC,SUR,AHULAB | Performed by: STUDENT IN AN ORGANIZED HEALTH CARE EDUCATION/TRAINING PROGRAM

## 2024-01-05 PROCEDURE — A55700 PR BIOPSY OF PROSTATE,NEEDLE/PUNCH: Performed by: NURSE ANESTHETIST, CERTIFIED REGISTERED

## 2024-01-05 PROCEDURE — 3600000007 HC OR TIME - EACH INCREMENTAL 1 MINUTE - PROCEDURE LEVEL TWO: Performed by: STUDENT IN AN ORGANIZED HEALTH CARE EDUCATION/TRAINING PROGRAM

## 2024-01-05 PROCEDURE — 7100000009 HC PHASE TWO TIME - INITIAL BASE CHARGE: Performed by: STUDENT IN AN ORGANIZED HEALTH CARE EDUCATION/TRAINING PROGRAM

## 2024-01-05 PROCEDURE — 3600000002 HC OR TIME - INITIAL BASE CHARGE - PROCEDURE LEVEL TWO: Performed by: STUDENT IN AN ORGANIZED HEALTH CARE EDUCATION/TRAINING PROGRAM

## 2024-01-05 PROCEDURE — A55700 PR BIOPSY OF PROSTATE,NEEDLE/PUNCH: Performed by: STUDENT IN AN ORGANIZED HEALTH CARE EDUCATION/TRAINING PROGRAM

## 2024-01-05 RX ORDER — CIPROFLOXACIN 2 MG/ML
400 INJECTION, SOLUTION INTRAVENOUS ONCE
Status: DISCONTINUED | OUTPATIENT
Start: 2024-01-05 | End: 2024-01-05 | Stop reason: HOSPADM

## 2024-01-05 RX ORDER — PROPOFOL 10 MG/ML
INJECTION, EMULSION INTRAVENOUS AS NEEDED
Status: DISCONTINUED | OUTPATIENT
Start: 2024-01-05 | End: 2024-01-05

## 2024-01-05 RX ORDER — ACETAMINOPHEN 325 MG/1
650 TABLET ORAL EVERY 4 HOURS PRN
Status: DISCONTINUED | OUTPATIENT
Start: 2024-01-05 | End: 2024-01-05 | Stop reason: HOSPADM

## 2024-01-05 RX ORDER — CIPROFLOXACIN 500 MG/1
500 TABLET ORAL 2 TIMES DAILY
Qty: 6 TABLET | Refills: 0 | Status: SHIPPED | OUTPATIENT
Start: 2024-01-05 | End: 2024-01-08

## 2024-01-05 RX ORDER — CEFTRIAXONE 1 G/1
1 INJECTION, POWDER, FOR SOLUTION INTRAMUSCULAR; INTRAVENOUS ONCE
Status: DISCONTINUED | OUTPATIENT
Start: 2024-01-05 | End: 2024-01-05

## 2024-01-05 RX ORDER — PROPOFOL 10 MG/ML
INJECTION, EMULSION INTRAVENOUS CONTINUOUS PRN
Status: DISCONTINUED | OUTPATIENT
Start: 2024-01-05 | End: 2024-01-05

## 2024-01-05 RX ORDER — LIDOCAINE HYDROCHLORIDE 10 MG/ML
0.1 INJECTION, SOLUTION EPIDURAL; INFILTRATION; INTRACAUDAL; PERINEURAL ONCE
Status: DISCONTINUED | OUTPATIENT
Start: 2024-01-05 | End: 2024-01-05 | Stop reason: HOSPADM

## 2024-01-05 RX ORDER — CEFTRIAXONE 1 G/50ML
1 INJECTION, SOLUTION INTRAVENOUS ONCE
Status: COMPLETED | OUTPATIENT
Start: 2024-01-05 | End: 2024-01-05

## 2024-01-05 RX ORDER — SODIUM CHLORIDE, SODIUM LACTATE, POTASSIUM CHLORIDE, CALCIUM CHLORIDE 600; 310; 30; 20 MG/100ML; MG/100ML; MG/100ML; MG/100ML
100 INJECTION, SOLUTION INTRAVENOUS CONTINUOUS
Status: DISCONTINUED | OUTPATIENT
Start: 2024-01-05 | End: 2024-01-05 | Stop reason: HOSPADM

## 2024-01-05 RX ORDER — DIPHENHYDRAMINE HYDROCHLORIDE 50 MG/ML
25 INJECTION INTRAMUSCULAR; INTRAVENOUS ONCE
Status: DISCONTINUED | OUTPATIENT
Start: 2024-01-05 | End: 2024-01-05 | Stop reason: HOSPADM

## 2024-01-05 RX ORDER — SODIUM CHLORIDE 9 MG/ML
100 INJECTION, SOLUTION INTRAVENOUS CONTINUOUS
Status: DISCONTINUED | OUTPATIENT
Start: 2024-01-05 | End: 2024-01-05 | Stop reason: HOSPADM

## 2024-01-05 RX ADMIN — PROPOFOL 20 MG: 10 INJECTION, EMULSION INTRAVENOUS at 10:16

## 2024-01-05 RX ADMIN — CEFTRIAXONE SODIUM 1 G: 1 INJECTION, SOLUTION INTRAVENOUS at 09:45

## 2024-01-05 RX ADMIN — PROPOFOL 150 MCG/KG/MIN: 10 INJECTION, EMULSION INTRAVENOUS at 10:15

## 2024-01-05 RX ADMIN — SODIUM CHLORIDE: 9 INJECTION, SOLUTION INTRAVENOUS at 10:11

## 2024-01-05 RX ADMIN — PROPOFOL 30 MG: 10 INJECTION, EMULSION INTRAVENOUS at 10:17

## 2024-01-05 RX ADMIN — PROPOFOL 80 MG: 10 INJECTION, EMULSION INTRAVENOUS at 10:15

## 2024-01-05 ASSESSMENT — COLUMBIA-SUICIDE SEVERITY RATING SCALE - C-SSRS
1. IN THE PAST MONTH, HAVE YOU WISHED YOU WERE DEAD OR WISHED YOU COULD GO TO SLEEP AND NOT WAKE UP?: NO
6. HAVE YOU EVER DONE ANYTHING, STARTED TO DO ANYTHING, OR PREPARED TO DO ANYTHING TO END YOUR LIFE?: NO
2. HAVE YOU ACTUALLY HAD ANY THOUGHTS OF KILLING YOURSELF?: NO

## 2024-01-05 ASSESSMENT — PAIN - FUNCTIONAL ASSESSMENT
PAIN_FUNCTIONAL_ASSESSMENT: 0-10

## 2024-01-05 ASSESSMENT — PAIN SCALES - GENERAL
PAINLEVEL_OUTOF10: 0 - NO PAIN

## 2024-01-05 NOTE — DISCHARGE INSTRUCTIONS
DEPARTMENT OF UROLOGY  DISCHARGE INSTRUCTIONS PROSTATE BIOPSY  Outpatient Surgery    C O N F I D E N T I A L   I N F O R M A T I O N    Domingo Greer        Call [phone] during regular daytime business hours (8:00 am - 5:00 pm) and after 5:00 pm and ask for the Urology Resident with any questions or concerns.      If it is a life-threatening situation, proceed to the nearest emergency department.        Follow-up appointment:  1/24/2024 at 2:15 pm at [facility]    in 2 weeks to review pathology results     Thank you for the opportunity to care for you today.  Your health and healing are very important to us.  We hope we made you feel as comfortable as possible and are committed to your recovery and continued well-being.      The following is a brief overview of your prostate biopsy today. Some of the information contained on this summary may be confidential.  This information should be kept in your records and should be shared with your regular doctor.    Physicians:   Dr. Gordy Gaspar      Procedure performed: prostate biopsy  Pending results:   prostate pathology results    What to Expect During your Recovery and Home Care  Anesthesia Side Effects   You received MAC anesthesia today.  You may feel sleepy, tired, or have a sore throat.   You may also feel drowsiness, dizziness, or inability to think clearly.  For your safety, do not drive, drink alcoholic beverages, take any unprescribed medication or make any important decisions for 24 hours.  A responsible adult should be with you for 24 hours.        Activity and Recovery    No heavy lifting or strenuous activity for several days. Avoid activities that put pressure on your rectal area. Do not have sex for a few days.      Pain Control  Unfortunately, you may experience pain after your procedure.  Adequate management can include alternative measures to help ease your pain and can include ice packs, and over the counter Tylenol can be taken as prescribed as  needed for breakthrough pain. Do not take more then 4,000mg of Tylenol in a 24-hour period.      Nausea/Vomiting   Clear liquids are best tolerated at first. Start slow, advance your diet as tolerated to normal foods. Avoid spicy, greasy, heavy foods at first. Also, you may feel nauseous or like you need to vomit if you take any type of medication on an empty stomach.  Call your physician if you are unable to eat or drink and have persistent vomiting.    Signs of Bleeding   Minor bleeding or drainage may occur from your rectum however, excessive or consistent bleeding should be reported to your surgeon. Excessive bleeding is defined as blood that is dripping from rectum, soaking through your pants, and is ketchup colored, thick with possible blood clots.  Consistent is defined as bleeding that does not stop. You may have blood in your poop, urine and semen for several weeks.     Treatment/wound care:   It is okay to shower 24 hours after time of surgery.      Signs of Infection  Signs of infection can include fever, burning sensation with urination, frequency, urgency or severe pain.  If you see any of these occur, please contact your doctor's office at [phone].  Any fever higher than 100.4, especially if associated with an ill feeling, abdominal pain, chills, or nausea should be reported to your surgeon.      Assist in bowel movements/urination  Increase fiber in diet  Urination should occur within 6 hours of anesthesia.   Increase water (6 to 8 glasses)  Increase walking   If you have tried these methods and your bladder still feels full and you cannot use the bathroom, please go to your nearest Emergency room.    Additional Instructions:   Try not to strain when going to the bathroom. Do not hold your urine. We will go over your biopsy results at your follow up appointment. It takes 7-10 business days for the results to come back.

## 2024-01-05 NOTE — ANESTHESIA POSTPROCEDURE EVALUATION
Patient: Domingo Greer    Procedure Summary       Date: 01/05/24 Room / Location: [facility] A OR 01 / Virtual [facility] A OR    Anesthesia Start: 1010 Anesthesia Stop: 1033    Procedure: Transperineal Precision Point Prostate Biopsy Diagnosis:       PSA elevation      (PSA elevation [R97.20])    Surgeons: Dino Gaspar MD MPH Responsible Provider: Sunny Brewer MD    Anesthesia Type: MAC ASA Status: 3            Anesthesia Type: MAC    Vitals Value Taken Time   /63 01/05/24 1046   Temp 36.2 °C (97.2 °F) 01/05/24 1035   Pulse 60 01/05/24 1051   Resp 15 01/05/24 1045   SpO2 93 % 01/05/24 1051   Vitals shown include unvalidated device data.    Anesthesia Post Evaluation    Patient location during evaluation: PACU  Patient participation: complete - patient participated  Level of consciousness: awake and alert  Pain management: adequate  Airway patency: patent  Cardiovascular status: acceptable  Respiratory status: acceptable  Hydration status: acceptable  Postoperative Nausea and Vomiting: none        No notable events documented.

## 2024-01-05 NOTE — ANESTHESIA PREPROCEDURE EVALUATION
Patient: Domingo Greer    Procedure Information       Anesthesia Start Date/Time: 01/05/24 1010    Procedure: Transperineal Precision Point Prostate Biopsy    Location: [facility] A OR 01 / Virtual [facility] A OR    Surgeons: Dino Gaspar MD MPH            Relevant Problems   Cardiovascular   (+) Abnormal EKG   (+) Atrial fibrillation (CMS/HCC)   (+) Chronic systolic (congestive) heart failure (CMS/HCC)      Neuro/Psych   (+) Bilateral carpal tunnel syndrome      Pulmonary   (+) Asthma   (+) Asthma exacerbation   (+) Shortness of breath on exertion      GI/Hepatic   (+) Elevated liver function tests      Musculoskeletal   (+) Bilateral carpal tunnel syndrome       Clinical information reviewed:   Tobacco  Allergies  Meds   Med Hx  Surg Hx   Fam Hx  Soc Hx        NPO Detail:  NPO/Void Status  Carbonhydrate Drink Given Prior to Surgery? : N  Date of Last Liquid: 01/05/24  Time of Last Liquid: 0430  Date of Last Solid: 01/05/24  Time of Last Solid: 0000  Last Intake Type: Clear fluids  Time of Last Void: 0745         Physical Exam    Airway  Mallampati: II  TM distance: >3 FB  Neck ROM: full     Cardiovascular   Rhythm: regular  Rate: normal     Dental    Pulmonary   Breath sounds clear to auscultation     Abdominal            Anesthesia Plan    ASA 3     MAC     Anesthetic plan and risks discussed with patient.  Use of blood products discussed with patient who.

## 2024-01-05 NOTE — OP NOTE
Transperineal Precision Point Prostate Biopsy Operative Note     Date: 2024  OR Location: [facility] A OR    Name: Domingo Greer, : 1960, Age: 63 y.o., MRN: [id number], Sex: male    Diagnosis  Pre-op Diagnosis     * PSA elevation [R97.20] Post-op Diagnosis     * PSA elevation [R97.20]     Procedures  Transperineal Precision Point Prostate Biopsy  38680 - [place] PROSTATE NEEDLE BIOPSY ANY APPROACH      Surgeons      * Dino Gaspar - Primary    Resident/Fellow/Other Assistant:  Surgeon(s) and Role:     * Amarilis Muñiz MD - Resident - Assisting    Procedure Summary  Anesthesia: Monitor Anesthesia Care  ASA: ASA status not filed in the log.  Anesthesia Staff: Anesthesiologist: Sunny Brewer MD  CRNA: FATUMA Dyer-CRNA  Estimated Blood Loss: 5mL  Intra-op Medications: * No intraprocedure medications in log *           Anesthesia Record               Intraprocedure I/O Totals          Intake    Propofol Drip 0.00 mL    The total shown is the total volume documented since Anesthesia Start was filed.    Total Intake 0 mL          Specimen:   ID Type Source Tests Collected by Time   A : LLA Tissue PROSTATE, BIOPSY, LEFT LATERAL APEX SURGICAL PATHOLOGY EXAM Dino Gaspar MD MPH 2024 1021   B : LMA Tissue PROSTATE, BIOPSY, LEFT MEDIAL APEX SURGICAL PATHOLOGY EXAM Dino Gaspar MD MPH 2024 1021   C : LLM Tissue PROSTATE, BIOPSY, LEFT LATERAL MID SURGICAL PATHOLOGY EXAM Dino Gaspar MD MPH 2024 1021   D : LMM Tissue PROSTATE, BIOPSY, LEFT MEDIAL MID SURGICAL PATHOLOGY EXAM Dino Gaspar MD MPH 2024 1021   E : LLB Tissue PROSTATE, BIOPSY, LEFT LATERAL BASE SURGICAL PATHOLOGY EXAM Dino Gaspar MD MPH 2024 1021   F : LMB Tissue PROSTATE, BIOPSY, LEFT MEDIAL BASE SURGICAL PATHOLOGY EXAM Dino Gaspar MD MPH 2024 1022   G : RLA Tissue PROSTATE, BIOPSY, RIGHT LATERAL APEX SURGICAL PATHOLOGY EXAM Dino Gaspar MD MPH 2024 1022   H : RMA Tissue PROSTATE,  BIOPSY, RIGHT MEDIAL APEX SURGICAL PATHOLOGY EXAM Dino Gaspar MD MPH 1/5/2024 1022   I : RLM Tissue PROSTATE, BIOPSY, RIGHT LATERAL MID SURGICAL PATHOLOGY EXAM Dino Gaspar MD MPH 1/5/2024 1022   J : RMM Tissue PROSTATE, BIOPSY, RIGHT MEDIAL MID SURGICAL PATHOLOGY EXAM Dino Gaspar MD MPH 1/5/2024 1022   K : RLB Tissue PROSTATE, BIOPSY, RIGHT LATERAL BASE SURGICAL PATHOLOGY EXAM Dino Gaspar MD MPH 1/5/2024 1023   L : RMB Tissue PROSTATE, BIOPSY, RIGHT MEDIAL BASE SURGICAL PATHOLOGY EXAM Dino Gaspar MD MPH 1/5/2024 1023   M : LESION OF INTEREST Tissue PROSTATE BIOPSY TARGETED SHANICE SURGICAL PATHOLOGY EXAM Dino Gaspar MD MPH 1/5/2024 1023        Staff:   Circulator: Elisa Thayer RN; Sherry Gallagher RN  Scrub Person: Aury Stokes RN         Drains and/or Catheters: * None in log *                Indications: Domingo Greer is an 63 y.o. male who is having surgery for PSA elevation [R97.20].     The patient was seen in the preoperative area. The risks, benefits, complications, treatment options, non-operative alternatives, expected recovery and outcomes were discussed with the patient. The possibilities of reaction to medication, pulmonary aspiration, injury to surrounding structures, bleeding, recurrent infection, the need for additional procedures, failure to diagnose a condition, and creating a complication requiring transfusion or operation were discussed with the patient. The patient concurred with the proposed plan, giving informed consent.  The site of surgery was properly noted/marked if necessary per policy. The patient has been actively warmed in preoperative area. Preoperative antibiotics have been ordered and given within 1 hours of incision. Venous thrombosis prophylaxis have been ordered including bilateral sequential compression devices    Procedure Details:     Patient consented to the procedure in the preoperative area. Risks and benefits were reviewed.  Questions were answered. Patient allergies were reviewed. Ceftriaxone was given for prophylaxis.    Patient was brought to the OR and placed in left lateral decumbent position on the OR table. A timeout was performed. All were in agreement. Preoperative antibiotics were administered. Patient underwent anesthesia without complication. Transrectal ultrasound was used to obtain prostate volume (58g). We then obtained samples from the right medial and lateral sides of the apex, mid, and base of prostate for 6 cores on the right. We repeated biopsies on the left hand side in the same pattern for a total of 6 left cores. We then obtained 5 core biopsies from the 1 area of interest on MRI using MRI fusion. We had a total of 14 cores. Pressure was held for hemostasis. This concluded the procedure. Patient was awoken form anesthesia without complication and was transferred to PACU in stable condition.     PS 58 gs    He will fu with me in 2 weeks for path results.   Complications:  None; patient tolerated the procedure well.    Disposition: PACU - hemodynamically stable.  Condition: stable           Attending Attestation: I was present and scrubbed for the entire procedure.    Dino Gaspar  Phone Number: [phone]

## 2024-01-07 NOTE — PROGRESS NOTES
Counseling:  The patient was counseled regarding diagnostic results, instructions for management, risk factor reductions, prognosis, patient and family education, impressions, risks and benefits of treatment options and importance of compliance with treatment.      Chief Complaint:   The patient presents today for 3-month followup of a-fib and heart failure s/p echocardiogram.      History Of Present Illness:    Domingo Greer is a 63 year old male patient who presents today for 3-month followup of a-fib and heart failure s/p echocardiogram. 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 Echocardiogram performed 11/09/2023 demonstrated an EF of 55%. Today, the patient states that he is feeling well. He denies any CP, chest discomfort or SOB. At last office visit, his carvedilol was increased from 12.5 mg BID to 25 mg BID; however, the patient states that there may have been an error on his prior dosing of carvedilol, indicating that he has been taking 100 mg BID as he was on 50 mg BID previously, so when he was told he could take 2 tablets BID until his current Rx was done, he was actually taking two 50 mg tablets BID rather than two 12.5 mg tablets BID. BP is typically stable.     Last Recorded Vitals:  Vitals:    01/08/24 1427   BP: (!) 150/100   BP Location: Left arm   Pulse: 67   Weight: 122 kg (268 lb)   Height: 1.829 m (6')     Past Surgical History:  He has a past surgical history that includes Rotator cuff repair (Bilateral, 11/13/2013); Cholecystectomy (11/13/2013); Ankle surgery (Right, 11/13/2013); Other surgical history (09/23/2022); Cardiac electrophysiology mapping and ablation; Colonoscopy; Cardiac catheterization; and [place] tooth extraction.      Social History:  He reports that he has quit smoking. His smoking use included cigarettes. He has never used smokeless tobacco. He  reports current alcohol use of about 2.0 standard drinks of alcohol per week. He reports that he does not use drugs.    Family History:  Family History   Problem Relation Name Age of Onset    Hypertension Mother      Prostate cancer Father       Allergies:  Other and Penicillin    Outpatient Medications:  Current Outpatient Medications   Medication Instructions    albuterol 90 mcg/actuation inhaler inhale 1 to 2 puffs by mouth every 4 to 6 hours if needed    aspirin 81 mg EC tablet 1 tablet, oral, Daily    carvedilol (COREG) 25 mg, oral, 2 times daily with meals    ciprofloxacin (CIPRO) 500 mg, oral, 2 times daily    ciprofloxacin (CIPRO) 500 mg, oral, 2 times daily    furosemide (Lasix) 80 mg tablet 1 tablet, oral, Daily    ibuprofen 200 mg tablet 4 tablets, oral, Daily    losartan (COZAAR) 25 mg, oral, Daily     Review of Systems   All other systems reviewed and are negative.     Physical Exam:  Constitutional:       Appearance: Healthy appearance. Not in distress.   Neck:      Vascular: No JVR. JVD normal.   Pulmonary:      Effort: Pulmonary effort is normal.      Breath sounds: Normal breath sounds. No wheezing. No rhonchi. No rales.   Chest:      Chest wall: Not tender to palpatation.   Cardiovascular:      PMI at left midclavicular line. Normal rate. Regular rhythm. Normal S1. Normal S2.       Murmurs: There is no murmur.      No gallop.  No click. No rub.   Pulses:     Intact distal pulses.   Edema:     Peripheral edema absent.   Abdominal:      General: Bowel sounds are normal.      Palpations: Abdomen is soft.      Tenderness: There is no abdominal tenderness.   Musculoskeletal: Normal range of motion.         General: No tenderness. Skin:     General: Skin is warm and dry.   Neurological:      General: No focal deficit present.      Mental Status: Alert and oriented to person, place and time.       Last Labs:  CBC -  Lab Results   Component Value Date    WBC 6.5 06/01/2023    HGB 16.5 06/01/2023    HCT  48.5 06/01/2023    MCV 96 06/01/2023     06/01/2023       CMP -  Lab Results   Component Value Date    CALCIUM 9.2 06/01/2023    PROT 6.8 06/01/2023    ALBUMIN 4.2 06/01/2023    AST 50 (H) 06/01/2023    ALT 65 (H) 06/01/2023    ALKPHOS 60 06/01/2023    BILITOT 1.8 (H) 06/01/2023       LIPID PANEL -   Lab Results   Component Value Date    CHOL 135 06/23/2022    TRIG 49 06/23/2022    HDL 28.5 (A) 06/23/2022    CHHDL 4.7 06/23/2022    LDLF 97 06/23/2022    VLDL 10 06/23/2022       RENAL FUNCTION PANEL -   Lab Results   Component Value Date    GLUCOSE 72 (L) 06/01/2023     06/01/2023    K 3.8 06/01/2023     06/01/2023    CO2 30 06/01/2023    ANIONGAP 12 06/01/2023    BUN 30 (H) 06/01/2023    CREATININE 1.02 06/01/2023    GFRMALE 83 06/01/2023    CALCIUM 9.2 06/01/2023    ALBUMIN 4.2 06/01/2023        Lab Results   Component Value Date    HGBA1C 5.8 (A) 06/23/2022       Last Cardiology Tests:  11/09/2023 - TTE  Left ventricular systolic function is normal with a 55% estimated ejection fraction.     09/25/2023 - TTE  1. Left ventricular systolic function is mildly decreased with a 45-50% estimated ejection fraction.  2. Moderately increased left ventricular septal thickness.  3. Left Ventricular Global Longitudinal Strain - 12.8 %.    10/21/2022 - Electrophysiology Procedure   RFA.    07/29/2022 - Cardiac Catheterization (LH/RH)  1. No evidence of coronary artery disease.  2. Left Ventricular end-diastolic pressure = 16.  3. Normal LV filling pressures.    07/18/2022 - TTE  1. The left ventricular systolic function is severely decreased with a 30-35% estimated ejection fraction.  2. Severely enlarged right ventricle.  3. The left atrium is moderate to severely dilated.  4. The right atrium is moderately dilated.  5. Mildly elevated RVSP.  6. Mild aortic valve stenosis.  7. The left ventricular cavity size is moderately dilated.  8. There is global hypokinesis of the left ventricle with minor regional  variations.     Diagnostic review: I have personally reviewed the result(s) of the Echocardiogram.    Assessment/Plan   1) Atrial fibrillation s/p RFA Oct 2022  Echo Sept 2023 with LVEF 45-50%  ? rate related CMP  [id number] score is 2  On Coreg 25 mg BID (increased from 12.5 mg BID on 10/06/2023)  Message sent to Dr. Yeager Re: Xarelto   Xarelto subsequently discontinued   Denies palpitations   In NSR    2) Chronic Systolic Heart Failure  Appears compensated on exam  On Lasix 80 mg daily, Coreg 25 mg BID, losartan 25 mg daily  Left heart cath with no significant CAD July 2022  If LVEF remains <35%, EP to consider ICD   TTE 11/09/2023 with LVEF 55% - improved from 45-50%   Patient believes there may have been an error in his previous carvedilol dosing - see HPI for further details   Advised to take carvedilol at 25 mg BID  Check CBC, CMP, Lipid Panel, TSH  Followup with Erika Deng NP, in 6 months        Scribe Attestation  By signing my name below, I, Mary Bowman, Scribe   attest that this documentation has been prepared under the direction and in the presence of Venancio Brooks MD.

## 2024-01-08 ENCOUNTER — LAB (OUTPATIENT)
Dept: LAB | Facility: LAB | Age: 64
End: 2024-01-08
Payer: COMMERCIAL

## 2024-01-08 ENCOUNTER — OFFICE VISIT (OUTPATIENT)
Dept: CARDIOLOGY | Facility: CLINIC | Age: 64
End: 2024-01-08
Payer: COMMERCIAL

## 2024-01-08 VITALS
HEART RATE: 67 BPM | HEIGHT: 72 IN | SYSTOLIC BLOOD PRESSURE: 150 MMHG | WEIGHT: 268 LBS | DIASTOLIC BLOOD PRESSURE: 100 MMHG | BODY MASS INDEX: 36.3 KG/M2

## 2024-01-08 DIAGNOSIS — I42.0 DILATED CARDIOMYOPATHY (MULTI): ICD-10-CM

## 2024-01-08 DIAGNOSIS — I48.0 PAROXYSMAL ATRIAL FIBRILLATION (MULTI): ICD-10-CM

## 2024-01-08 LAB
ALBUMIN SERPL BCP-MCNC: 4.5 G/DL (ref 3.4–5)
ALP SERPL-CCNC: 51 U/L (ref 33–136)
ALT SERPL W P-5'-P-CCNC: 49 U/L (ref 10–52)
ANION GAP SERPL CALC-SCNC: 12 MMOL/L (ref 10–20)
AST SERPL W P-5'-P-CCNC: 35 U/L (ref 9–39)
BASOPHILS # BLD AUTO: 0.04 X10*3/UL (ref 0–0.1)
BASOPHILS NFR BLD AUTO: 0.6 %
BILIRUB SERPL-MCNC: 1.1 MG/DL (ref 0–1.2)
BNP SERPL-MCNC: 38 PG/ML (ref 0–99)
BUN SERPL-MCNC: 33 MG/DL (ref 6–23)
CALCIUM SERPL-MCNC: 9.1 MG/DL (ref 8.6–10.3)
CHLORIDE SERPL-SCNC: 98 MMOL/L (ref 98–107)
CHOLEST SERPL-MCNC: 190 MG/DL (ref 0–199)
CHOLESTEROL/HDL RATIO: 4.2
CO2 SERPL-SCNC: 30 MMOL/L (ref 21–32)
CREAT SERPL-MCNC: 0.99 MG/DL (ref 0.5–1.3)
EGFRCR SERPLBLD CKD-EPI 2021: 86 ML/MIN/1.73M*2
EOSINOPHIL # BLD AUTO: 0.41 X10*3/UL (ref 0–0.7)
EOSINOPHIL NFR BLD AUTO: 6.4 %
ERYTHROCYTE [DISTWIDTH] IN BLOOD BY AUTOMATED COUNT: 12.6 % (ref 11.5–14.5)
GLUCOSE SERPL-MCNC: 85 MG/DL (ref 74–99)
HCT VFR BLD AUTO: 48.7 % (ref 41–52)
HDLC SERPL-MCNC: 45.6 MG/DL
HGB BLD-MCNC: 16.4 G/DL (ref 13.5–17.5)
IMM GRANULOCYTES # BLD AUTO: 0.05 X10*3/UL (ref 0–0.7)
IMM GRANULOCYTES NFR BLD AUTO: 0.8 % (ref 0–0.9)
LDLC SERPL CALC-MCNC: 121 MG/DL
LYMPHOCYTES # BLD AUTO: 1.76 X10*3/UL (ref 1.2–4.8)
LYMPHOCYTES NFR BLD AUTO: 27.5 %
MCH RBC QN AUTO: 32 PG (ref 26–34)
MCHC RBC AUTO-ENTMCNC: 33.7 G/DL (ref 32–36)
MCV RBC AUTO: 95 FL (ref 80–100)
MONOCYTES # BLD AUTO: 0.84 X10*3/UL (ref 0.1–1)
MONOCYTES NFR BLD AUTO: 13.1 %
NEUTROPHILS # BLD AUTO: 3.3 X10*3/UL (ref 1.2–7.7)
NEUTROPHILS NFR BLD AUTO: 51.6 %
NON HDL CHOLESTEROL: 144 MG/DL (ref 0–149)
NRBC BLD-RTO: 0 /100 WBCS (ref 0–0)
PLATELET # BLD AUTO: 234 X10*3/UL (ref 150–450)
POTASSIUM SERPL-SCNC: 4.5 MMOL/L (ref 3.5–5.3)
PROT SERPL-MCNC: 6.8 G/DL (ref 6.4–8.2)
RBC # BLD AUTO: 5.13 X10*6/UL (ref 4.5–5.9)
SODIUM SERPL-SCNC: 135 MMOL/L (ref 136–145)
TRIGL SERPL-MCNC: 115 MG/DL (ref 0–149)
TSH SERPL-ACNC: 1.69 MIU/L (ref 0.44–3.98)
VLDL: 23 MG/DL (ref 0–40)
WBC # BLD AUTO: 6.4 X10*3/UL (ref 4.4–11.3)

## 2024-01-08 PROCEDURE — 83880 ASSAY OF NATRIURETIC PEPTIDE: CPT

## 2024-01-08 PROCEDURE — 1036F TOBACCO NON-USER: CPT | Performed by: INTERNAL MEDICINE

## 2024-01-08 PROCEDURE — 85025 COMPLETE CBC W/AUTO DIFF WBC: CPT

## 2024-01-08 PROCEDURE — 80061 LIPID PANEL: CPT

## 2024-01-08 PROCEDURE — 36415 COLL VENOUS BLD VENIPUNCTURE: CPT

## 2024-01-08 PROCEDURE — 93000 ELECTROCARDIOGRAM COMPLETE: CPT | Performed by: INTERNAL MEDICINE

## 2024-01-08 PROCEDURE — 99213 OFFICE O/P EST LOW 20 MIN: CPT | Performed by: INTERNAL MEDICINE

## 2024-01-08 PROCEDURE — 80053 COMPREHEN METABOLIC PANEL: CPT

## 2024-01-08 PROCEDURE — 84443 ASSAY THYROID STIM HORMONE: CPT

## 2024-01-08 NOTE — PATIENT INSTRUCTIONS
Please take the carvedilol at 25 mg twice daily.  Continue all other medications as prescribed.  Please have blood work drawn.  Followup with Erika Deng NP, in 6 months.      If you have any questions or cardiac concerns, please call our office at [phone].

## 2024-01-08 NOTE — LETTER
January 8, 2024     India Villagrna, FATUMA-CNP  [address] [facility] [address]  [facility] [address]    Patient: Domingo Greer   YOB: 1960   Date of Visit: 1/8/2024       Dear Dr. India Villagran, FATUMA-CNP:    Thank you for referring Domingo Greer to me for evaluation. Below are my notes for this consultation.  If you have questions, please do not hesitate to call me. I look forward to following your patient along with you.       Sincerely,     Venancio Brooks MD      CC: No Recipients  ______________________________________________________________________________________    Counseling:  The patient was counseled regarding diagnostic results, instructions for management, risk factor reductions, prognosis, patient and family education, impressions, risks and benefits of treatment options and importance of compliance with treatment.      Chief Complaint:   The patient presents today for 3-month followup of a-fib and heart failure s/p echocardiogram.      History Of Present Illness:    Domingo Greer is a 63 year old male patient who presents today for 3-month followup of a-fib and heart failure s/p echocardiogram. 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 Echocardiogram performed 11/09/2023 demonstrated an EF of 55%. Today, the patient states that he is feeling well. He denies any CP, chest discomfort or SOB. At last office visit, his carvedilol was increased from 12.5 mg BID to 25 mg BID; however, the patient states that there may have been an error on his prior dosing of carvedilol, indicating that he has been taking 100 mg BID as he was on 50 mg BID previously, so when he was told he could take 2 tablets BID until his current Rx was done, he was actually taking two 50 mg tablets BID rather than two 12.5 mg tablets BID. BP is typically stable.      Last Recorded Vitals:  Vitals:    01/08/24 1427   BP: (!) 150/100   BP Location: Left arm   Pulse: 67   Weight: 122 kg (268 lb)   Height: 1.829 m (6')     Past Surgical History:  He has a past surgical history that includes Rotator cuff repair (Bilateral, 11/13/2013); Cholecystectomy (11/13/2013); Ankle surgery (Right, 11/13/2013); Other surgical history (09/23/2022); Cardiac electrophysiology mapping and ablation; Colonoscopy; Cardiac catheterization; and [place] tooth extraction.      Social History:  He reports that he has quit smoking. His smoking use included cigarettes. He has never used smokeless tobacco. He reports current alcohol use of about 2.0 standard drinks of alcohol per week. He reports that he does not use drugs.    Family History:  Family History   Problem Relation Name Age of Onset   • Hypertension Mother     • Prostate cancer Father       Allergies:  Other and Penicillin    Outpatient Medications:  Current Outpatient Medications   Medication Instructions   • albuterol 90 mcg/actuation inhaler inhale 1 to 2 puffs by mouth every 4 to 6 hours if needed   • aspirin 81 mg EC tablet 1 tablet, oral, Daily   • carvedilol (COREG) 25 mg, oral, 2 times daily with meals   • ciprofloxacin (CIPRO) 500 mg, oral, 2 times daily   • ciprofloxacin (CIPRO) 500 mg, oral, 2 times daily   • furosemide (Lasix) 80 mg tablet 1 tablet, oral, Daily   • ibuprofen 200 mg tablet 4 tablets, oral, Daily   • losartan (COZAAR) 25 mg, oral, Daily     Review of Systems   All other systems reviewed and are negative.     Physical Exam:  Constitutional:       Appearance: Healthy appearance. Not in distress.   Neck:      Vascular: No JVR. JVD normal.   Pulmonary:      Effort: Pulmonary effort is normal.      Breath sounds: Normal breath sounds. No wheezing. No rhonchi. No rales.   Chest:      Chest wall: Not tender to palpatation.   Cardiovascular:      PMI at left midclavicular line. Normal rate. Regular rhythm. Normal S1. Normal  S2.       Murmurs: There is no murmur.      No gallop.  No click. No rub.   Pulses:     Intact distal pulses.   Edema:     Peripheral edema absent.   Abdominal:      General: Bowel sounds are normal.      Palpations: Abdomen is soft.      Tenderness: There is no abdominal tenderness.   Musculoskeletal: Normal range of motion.         General: No tenderness. Skin:     General: Skin is warm and dry.   Neurological:      General: No focal deficit present.      Mental Status: Alert and oriented to person, place and time.       Last Labs:  CBC -  Lab Results   Component Value Date    WBC 6.5 06/01/2023    HGB 16.5 06/01/2023    HCT 48.5 06/01/2023    MCV 96 06/01/2023     06/01/2023       CMP -  Lab Results   Component Value Date    CALCIUM 9.2 06/01/2023    PROT 6.8 06/01/2023    ALBUMIN 4.2 06/01/2023    AST 50 (H) 06/01/2023    ALT 65 (H) 06/01/2023    ALKPHOS 60 06/01/2023    BILITOT 1.8 (H) 06/01/2023       LIPID PANEL -   Lab Results   Component Value Date    CHOL 135 06/23/2022    TRIG 49 06/23/2022    HDL 28.5 (A) 06/23/2022    CHHDL 4.7 06/23/2022    LDLF 97 06/23/2022    VLDL 10 06/23/2022       RENAL FUNCTION PANEL -   Lab Results   Component Value Date    GLUCOSE 72 (L) 06/01/2023     06/01/2023    K 3.8 06/01/2023     06/01/2023    CO2 30 06/01/2023    ANIONGAP 12 06/01/2023    BUN 30 (H) 06/01/2023    CREATININE 1.02 06/01/2023    GFRMALE 83 06/01/2023    CALCIUM 9.2 06/01/2023    ALBUMIN 4.2 06/01/2023        Lab Results   Component Value Date    HGBA1C 5.8 (A) 06/23/2022       Last Cardiology Tests:  11/09/2023 - TTE  Left ventricular systolic function is normal with a 55% estimated ejection fraction.     09/25/2023 - TTE  1. Left ventricular systolic function is mildly decreased with a 45-50% estimated ejection fraction.  2. Moderately increased left ventricular septal thickness.  3. Left Ventricular Global Longitudinal Strain - 12.8 %.    10/21/2022 - Electrophysiology Procedure    RFA.    07/29/2022 - Cardiac Catheterization (LH/RH)  1. No evidence of coronary artery disease.  2. Left Ventricular end-diastolic pressure = 16.  3. Normal LV filling pressures.    07/18/2022 - TTE  1. The left ventricular systolic function is severely decreased with a 30-35% estimated ejection fraction.  2. Severely enlarged right ventricle.  3. The left atrium is moderate to severely dilated.  4. The right atrium is moderately dilated.  5. Mildly elevated RVSP.  6. Mild aortic valve stenosis.  7. The left ventricular cavity size is moderately dilated.  8. There is global hypokinesis of the left ventricle with minor regional variations.     Diagnostic review: I have personally reviewed the result(s) of the Echocardiogram.    Assessment/Plan  1) Atrial fibrillation s/p RFA Oct 2022  Echo Sept 2023 with LVEF 45-50%  ? rate related CMP  [id number] score is 2  On Coreg 25 mg BID (increased from 12.5 mg BID on 10/06/2023)  Message sent to Dr. Yeager Re: Xarelto   Xarelto subsequently discontinued   Denies palpitations   In NSR    2) Chronic Systolic Heart Failure  Appears compensated on exam  On Lasix 80 mg daily, Coreg 25 mg BID, losartan 25 mg daily  Left heart cath with no significant CAD July 2022  If LVEF remains <35%, EP to consider ICD   TTE 11/09/2023 with LVEF 55% - improved from 45-50%   Patient believes there may have been an error in his previous carvedilol dosing - see HPI for further details   Advised to take carvedilol at 25 mg BID  Check CBC, CMP, Lipid Panel, TSH  Followup with Erika Deng NP, in 6 months        Scribe Attestation  By signing my name below, I, Mikhail Mraie   attest that this documentation has been prepared under the direction and in the presence of Venancio Brooks MD.

## 2024-01-18 LAB
LAB AP ASR DISCLAIMER: NORMAL
LABORATORY COMMENT REPORT: NORMAL
PATH REPORT.FINAL DX SPEC: NORMAL
PATH REPORT.GROSS SPEC: NORMAL
PATH REPORT.RELEVANT HX SPEC: NORMAL
PATH REPORT.TOTAL CANCER: NORMAL

## 2024-01-24 ENCOUNTER — OFFICE VISIT (OUTPATIENT)
Dept: UROLOGY | Facility: HOSPITAL | Age: 64
End: 2024-01-24
Payer: COMMERCIAL

## 2024-01-24 DIAGNOSIS — C61 PROSTATE CANCER (MULTI): Primary | ICD-10-CM

## 2024-01-24 PROCEDURE — 99215 OFFICE O/P EST HI 40 MIN: CPT | Performed by: STUDENT IN AN ORGANIZED HEALTH CARE EDUCATION/TRAINING PROGRAM

## 2024-01-24 PROCEDURE — 1036F TOBACCO NON-USER: CPT | Performed by: STUDENT IN AN ORGANIZED HEALTH CARE EDUCATION/TRAINING PROGRAM

## 2024-01-24 NOTE — PROGRESS NOTES
Subjective   Patient ID: Domingo Greer is a 63 y.o. male    HPI  63 y.o. male who presents for prostatic biopsy result interpretation which revealed prostatic adenocarcinoma    The prostatic biopsy, conducted on 01/05/2024, revealed Prostatic adenocarcinoma  Adore score 7 (4+3), Grade group 3    Review of Systems    All systems were reviewed. Anything negative was noted in the HPI.    Objective   Physical Exam    General: Well developed, well nourished, alert and cooperative, appears in no acute distress   Eyes: Non-injected conjunctiva, sclera clear, no proptosis   Cardiac: Extremities are warm and well perfused. No edema, cyanosis or pallor   Lungs: Breathing is easy, non-labored. Speaking in clear and complete sentences. Normal diaphragmatic movement   MSK: Ambulatory with steady gait, unassisted   Neuro: Alert and oriented to person, place, and time   Psych: Demonstrates good judgment and reason, without hallucinations, abnormal affect or abnormal behaviors   Skin: No obvious lesions, no rashes       No CVA tenderness bilaterally   No suprapubic pain or discomfort       Past Medical History:   Diagnosis Date    Afib (CMS/HCC)     Asthma     BPH (benign prostatic hyperplasia)     Cellulitis, unspecified     Cellulitis    DVT (deep venous thrombosis) (CMS/HCC)     multiple    Personal history of other diseases of the circulatory system 08/16/2022    History of peripheral vascular disease    Seizure (CMS/HCC)     in the setting of fever/infection    Venous insufficiency of both lower extremities     Venous ulcer (CMS/HCC)     right foot         Past Surgical History:   Procedure Laterality Date    ANKLE SURGERY Right 11/13/2013    Ankle Surgery    CARDIAC CATHETERIZATION      CARDIAC ELECTROPHYSIOLOGY MAPPING AND ABLATION      for afib    CHOLECYSTECTOMY  11/13/2013    Cholecystectomy    COLONOSCOPY      OTHER SURGICAL HISTORY  09/23/2022    Vascular surgical procedure    ROTATOR CUFF REPAIR Bilateral  11/13/2013    Rotator Cuff Repair    WISDOM TOOTH EXTRACTION           Assessment/Plan   High volume, Intermediate risk Prostatic adenocarcinoma G7 (4+3), Grade Group 3    63 y.o. male who presents for prostatic biopsy result, I reviewed with the patient the epidemiology and natural history of prostate cancer, including the different grades and stages including the Lerona scores and the newer grade group classifications. I explained to him that with his focal lesion on MRI and we need to do a metastatic work-up. I explained to him that such disease is typically managed with ether robotic assisted laparoscopic radical prostatectomy, external beam radiation, seeds implants, HIFU.  I explained to him however several other variables can come into play, including the age of the patient, the trend of PSA change, the volume of disease, the comorbidities, and the genomic profile to predict the chance of progression in the future. I explained to him that my preference is against active surveillance with delayed intervention given his pathology. we discussed the risk, benefit, step-by-step procedure, adverse events, side effects, outcomes, quality of life, and survival benefit of each therapy for the prostate cancer.  More specifically discussed urinary incontinence and rectal dysfunction and oncologic outcomes for each.      Plan:  - Follow up PSMA PET-CT scan  - Refer to Dr. Steele  - Refer to radiation oncology  - Follow up in 6 weeks  - Oncotype DX genetic risk assessment                   Scribe Attestation  By signing my name below, I, Mikhail Jones   attest that this documentation has been prepared under the direction and in the presence of Dr. Dino Gaspar

## 2024-02-06 ENCOUNTER — HOSPITAL ENCOUNTER (OUTPATIENT)
Dept: RADIATION ONCOLOGY | Facility: CLINIC | Age: 64
Setting detail: RADIATION/ONCOLOGY SERIES
Discharge: HOME | End: 2024-02-06
Payer: COMMERCIAL

## 2024-02-06 VITALS
WEIGHT: 262.7 LBS | DIASTOLIC BLOOD PRESSURE: 86 MMHG | SYSTOLIC BLOOD PRESSURE: 172 MMHG | OXYGEN SATURATION: 100 % | TEMPERATURE: 97 F | HEART RATE: 67 BPM | BODY MASS INDEX: 35.63 KG/M2 | RESPIRATION RATE: 16 BRPM

## 2024-02-06 DIAGNOSIS — C61 PROSTATE CANCER (MULTI): Primary | ICD-10-CM

## 2024-02-06 PROCEDURE — 99215 OFFICE O/P EST HI 40 MIN: CPT | Performed by: STUDENT IN AN ORGANIZED HEALTH CARE EDUCATION/TRAINING PROGRAM

## 2024-02-06 PROCEDURE — 99205 OFFICE O/P NEW HI 60 MIN: CPT | Performed by: STUDENT IN AN ORGANIZED HEALTH CARE EDUCATION/TRAINING PROGRAM

## 2024-02-06 ASSESSMENT — COLUMBIA-SUICIDE SEVERITY RATING SCALE - C-SSRS
2. HAVE YOU ACTUALLY HAD ANY THOUGHTS OF KILLING YOURSELF?: NO
6. HAVE YOU EVER DONE ANYTHING, STARTED TO DO ANYTHING, OR PREPARED TO DO ANYTHING TO END YOUR LIFE?: NO
6. HAVE YOU EVER DONE ANYTHING, STARTED TO DO ANYTHING, OR PREPARED TO DO ANYTHING TO END YOUR LIFE?: NO
1. IN THE PAST MONTH, HAVE YOU WISHED YOU WERE DEAD OR WISHED YOU COULD GO TO SLEEP AND NOT WAKE UP?: NO
1. IN THE PAST MONTH, HAVE YOU WISHED YOU WERE DEAD OR WISHED YOU COULD GO TO SLEEP AND NOT WAKE UP?: NO
2. HAVE YOU ACTUALLY HAD ANY THOUGHTS OF KILLING YOURSELF?: NO

## 2024-02-06 ASSESSMENT — LIFESTYLE VARIABLES
HOW OFTEN DO YOU HAVE SIX OR MORE DRINKS ON ONE OCCASION: LESS THAN MONTHLY
AUDIT-C TOTAL SCORE: 3
HOW MANY STANDARD DRINKS CONTAINING ALCOHOL DO YOU HAVE ON A TYPICAL DAY: 1 OR 2
HOW OFTEN DO YOU HAVE A DRINK CONTAINING ALCOHOL: 2-4 TIMES A MONTH
SKIP TO QUESTIONS 9-10: 0

## 2024-02-06 ASSESSMENT — PATIENT HEALTH QUESTIONNAIRE - PHQ9
SUM OF ALL RESPONSES TO PHQ9 QUESTIONS 1 AND 2: 0
1. LITTLE INTEREST OR PLEASURE IN DOING THINGS: NOT AT ALL
2. FEELING DOWN, DEPRESSED OR HOPELESS: NOT AT ALL
SUM OF ALL RESPONSES TO PHQ9 QUESTIONS 1 & 2: 0
2. FEELING DOWN, DEPRESSED OR HOPELESS: NOT AT ALL
1. LITTLE INTEREST OR PLEASURE IN DOING THINGS: NOT AT ALL

## 2024-02-06 ASSESSMENT — ENCOUNTER SYMPTOMS
DEPRESSION: 0
OCCASIONAL FEELINGS OF UNSTEADINESS: 0
LOSS OF SENSATION IN FEET: 0

## 2024-02-06 ASSESSMENT — PAIN SCALES - GENERAL: PAINLEVEL: 0-NO PAIN

## 2024-02-06 NOTE — PROGRESS NOTES
Radiation Oncology Outpatient Consult    Patient Name:  Domingo Greer  MRN:  [id number]  :  1960    Referring Provider: Dino Skelton MD M*  Primary Care Provider: JP Carlson  Care Team: Patient Care Team:  JP Carlson as PCP - General (Family Medicine)    Date of Service: 2024     SUBJECTIVE  History of Present Illness:  Domingo Greer is a 63 y.o. male who was referred by Dino Skelton MD M*, for a consultation to the [facility] Department of Radiation Oncology.  He is presenting for evaluation and management of unfavorable intermediate risk prostate cancer, [id number], [place] 4+3=7, iPSA 4.83 ng/mL.     He has a history of low testosterone, and has taken supplemental testosterone for about 15 years. He was found to have elevated PSA at 3.11 on 22 and 4.83 ng/mL on 23.    Prostate MRI on 23 showed a PI-RADS 4 lesion measuring 0.9 cm at the left peripheral zone midgland. There was no gross MICK, SVI, or pelvic adenopathy. The prostate was estimated to weigh 58.6 g.     On 24, Dr. Gordy Gaspar performed TRUS biopsy with targeting of the lesion seen on MRI. 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 There was also [place] 3+4=7 and Adore 3+3=6 disease from the left lateral apex, left medial apex, left lateral mid, left lateral base, right medial apex, and right lateral mid. The left medial base, right medial mid, and right lateral base cores also showed rare atypical cells, suspicious for adenocarcinoma. The left medial mid also showed focal high grade prostatic intraepithelial neoplasia.    Dr. Gordy Gaspar discussed the biopsy results with him, and referred him to see Dr. Steele and radiation oncology. PSMA PET scan is scheduled for 24. He will meet with Dr. Steele on 24. Oncotype DX testing is pending.    Today, he feels well overall. His JEB score is 19 and his IPSS is 3  with nocturia once per night. He does not use any erectile aids or take any BPH medications. He denies dysuria, hematuria, blood in his stool, new back/bone pains, or unexplained fatigue or weight loss. He continues to take supplemental testosterone currently.    PMH: CHF, Afib s/p ablation (no longer on anticoagulation), asthma  PSH: foot fracture repair with plate/screws, bilateral rotator cuff surgeries, bilateral carpal tunnel surgeries  FMH: father and paternal uncle with prostate cancer  SHx: lives in [place], [place] with his wife and mother-in-law. Works full time as  at a [employer] center. Former smoker, quit about 10 years ago. Drinks alcohol 4-5 times a week. Denies illicit drug use.    Prior Radiotherapy:  No  Radiation Treatments       No radiation treatments to show. (Treatments may have been administered in another system.)          Current Systemic Treatment:  No     Presence of Pacemaker or ICD:  No    Past Medical History:    Past Medical History:   Diagnosis Date    Afib (CMS/HCC)     Asthma     BPH (benign prostatic hyperplasia)     Cellulitis, unspecified     Cellulitis    DVT (deep venous thrombosis) (CMS/HCC)     multiple    Personal history of other diseases of the circulatory system 08/16/2022    History of peripheral vascular disease    Seizure (CMS/HCC)     in the setting of fever/infection    Venous insufficiency of both lower extremities     Venous ulcer (CMS/HCC)     right foot        Past Surgical History:    Past Surgical History:   Procedure Laterality Date    ANKLE SURGERY Right 11/13/2013    Ankle Surgery    CARDIAC CATHETERIZATION      CARDIAC ELECTROPHYSIOLOGY MAPPING AND ABLATION      for afib    CHOLECYSTECTOMY  11/13/2013    Cholecystectomy    COLONOSCOPY      OTHER SURGICAL HISTORY  09/23/2022    Vascular surgical procedure    ROTATOR CUFF REPAIR Bilateral 11/13/2013    Rotator Cuff Repair    WISDOM TOOTH EXTRACTION          Family History:   Cancer-related family history includes Prostate cancer in his father.    Social History:    Social History     Tobacco Use    Smoking status: Former     Years: 20     Types: Cigarettes    Smokeless tobacco: Never   Vaping Use    Vaping Use: Never used   Substance Use Topics    Alcohol use: Yes     Alcohol/week: 2.0 standard drinks of alcohol     Types: 2 Shots of liquor per week    Drug use: Never       Allergies:    Allergies   Allergen Reactions    Other Anaphylaxis     Bee sting    Penicillin Anaphylaxis     per family history-2 brothers        Medications:    Current Outpatient Medications:     albuterol 90 mcg/actuation inhaler, inhale 1 to 2 puffs by mouth every 4 to 6 hours if needed, Disp: , Rfl:     aspirin 81 mg EC tablet, Take 1 tablet (81 mg) by mouth once daily., Disp: , Rfl:     carvedilol (Coreg) 25 mg tablet, Take 1 tablet (25 mg) by mouth 2 times a day with meals., Disp: 180 tablet, Rfl: 3    furosemide (Lasix) 80 mg tablet, Take 1 tablet (80 mg) by mouth once daily., Disp: , Rfl:     ibuprofen 200 mg tablet, Take 4 tablets (800 mg) by mouth once daily., Disp: , Rfl:     losartan (Cozaar) 25 mg tablet, Take 1 tablet (25 mg) by mouth once daily., Disp: 90 tablet, Rfl: 3      Review of Systems:  Review of Systems   All other systems reviewed and are negative.      Performance Status:  The Karnofsky performance scale today is 90, Able to carry on normal activity; minor signs or symptoms of disease (ECOG equivalent 0).        OBJECTIVE  Physical Exam:  /86 (BP Location: Left arm, Patient Position: Sitting, BP Cuff Size: Large adult)   Pulse 67   Temp 36.1 °C (97 °F) (Temporal)   Resp 16   Wt 119 kg (262 lb 11.2 oz)   SpO2 100%   BMI 35.63 kg/m²    Physical Exam  Constitutional:       Appearance: Normal appearance.   HENT:      Head: Normocephalic and atraumatic.      Mouth/Throat:      Mouth: Mucous membranes are moist.      Pharynx: No oropharyngeal exudate or posterior oropharyngeal  erythema.   Eyes:      General: No scleral icterus.     Extraocular Movements: Extraocular movements intact.      Conjunctiva/sclera: Conjunctivae normal.      Pupils: Pupils are equal, round, and reactive to light.   Pulmonary:      Effort: Pulmonary effort is normal. No respiratory distress.   Musculoskeletal:         General: Normal range of motion.      Cervical back: Normal range of motion and neck supple.      Right lower leg: No edema.      Left lower leg: No edema.   Skin:     General: Skin is warm and dry.      Findings: No lesion or rash.   Neurological:      General: No focal deficit present.      Mental Status: He is alert and oriented to person, place, and time.      Cranial Nerves: No cranial nerve deficit.      Sensory: No sensory deficit.      Motor: No weakness.      Coordination: Coordination normal.      Gait: Gait normal.   Psychiatric:         Mood and Affect: Mood normal.         Behavior: Behavior normal.          Laboratory Review:  There are no laboratory contraindications to radiation therapy.    The pertinent lab results were reviewed and discussed with the patient.     Lab Results   Component Value Date    PSA 4.83 (H) 11/13/2023    PSA 3.11 06/23/2022        Pathology Review:  The pertinent pathology results were reviewed and discussed with the patient.  Notably, the results from his prostate biopsy on 1/5/24.    Imaging:  The pertinent imaging results were reviewed and discussed with the patient.  Notably, his prostate MRI from 12/7/23.       ASSESSMENT:  Domingo Greer is a 63 y.o. male with unfavorable intermediate risk prostate cancer, [id number], Adore 4+3=7, iPSA 4.83 ng/mL. He maintains excellent performance status (ECOG 0) and has good sexual (JEB 19) and urinary (IPSS 3) function. Staging PSMA PET is scheduled for 2/12/24.    I reviewed the standard of care options for treatment of unfavorable intermediate risk prostate cancer, including surgery versus external beam radiation  with concurrent/adjuvant short term ADT (4-6 months). I also discussed moderately hypofractionated radiation over 4 weeks versus SBRT in 5 fractions. I would recommend SpaceOAR hydrogel and fiducial marker placement if he chooses SBRT; he has no gross posterior MICK that would be a contraindication.    I discussed the rationale, benefits, risks, and process of prostate radiation therapy with the patient. I discussed the potential side effects, including but not limited to fatigue, bladder irritation, rectal irritation, diarrhea, cystitis, proctitis, bowel obstruction/perforation, erectile dysfunction, secondary malignancy, etc. The patient expressed understanding.    He will meet with Dr. Steele next week and make decision on treatment. He will contact our office if he chooses to pursue radiation with short-term ADT.    NCCN Guidelines were applicable to guide this patients treatment plan.     Alex Mills MD

## 2024-02-08 ENCOUNTER — APPOINTMENT (OUTPATIENT)
Dept: RADIATION ONCOLOGY | Facility: CLINIC | Age: 64
End: 2024-02-08
Payer: COMMERCIAL

## 2024-02-12 ENCOUNTER — HOSPITAL ENCOUNTER (OUTPATIENT)
Dept: RADIOLOGY | Facility: CLINIC | Age: 64
Discharge: HOME | End: 2024-02-12
Payer: COMMERCIAL

## 2024-02-12 DIAGNOSIS — C61 PROSTATE CANCER (MULTI): ICD-10-CM

## 2024-02-12 PROCEDURE — 78815 PET IMAGE W/CT SKULL-THIGH: CPT | Mod: PI

## 2024-02-12 PROCEDURE — 3430000001 HC RX 343 DIAGNOSTIC RADIOPHARMACEUTICALS: Performed by: STUDENT IN AN ORGANIZED HEALTH CARE EDUCATION/TRAINING PROGRAM

## 2024-02-12 PROCEDURE — 78815 PET IMAGE W/CT SKULL-THIGH: CPT | Mod: PET TUMOR INIT TX STRAT | Performed by: NUCLEAR MEDICINE

## 2024-02-12 PROCEDURE — A9800 HC RX 343 DIAGNOSTIC RADIOPHARMACEUTICALS: HCPCS | Performed by: STUDENT IN AN ORGANIZED HEALTH CARE EDUCATION/TRAINING PROGRAM

## 2024-02-12 RX ADMIN — KIT FOR THE PREPARATION OF GALLIUM GA 68 GOZETOTIDE INJECTION 5.48 MILLICURIE: KIT INTRAVENOUS at 10:59

## 2024-02-16 ENCOUNTER — OFFICE VISIT (OUTPATIENT)
Dept: UROLOGY | Facility: HOSPITAL | Age: 64
End: 2024-02-16
Payer: COMMERCIAL

## 2024-02-16 DIAGNOSIS — C61 MALIGNANT NEOPLASM OF PROSTATE (MULTI): Primary | ICD-10-CM

## 2024-02-16 PROCEDURE — 99214 OFFICE O/P EST MOD 30 MIN: CPT | Performed by: UROLOGY

## 2024-02-16 PROCEDURE — 1036F TOBACCO NON-USER: CPT | Performed by: UROLOGY

## 2024-02-16 NOTE — PROGRESS NOTES
NPV    HISTORY OF PRESENT ILLNESS:   Domingo Greer is a 63 y.o. male who is being seen today for NPV for Adore score 7 (4+3), GG 3 prostatic adenocarcinoma, referred by Dr Gaspar    He does have low testosterone. He has been on HRT for the last 13 years. He is quite active with motorcycle and bicycle riding.     Prior hx of laparoscopic gall bladder removal.      JEB  AUA    PAST MEDICAL HISTORY:  Past Medical History:   Diagnosis Date    Afib (CMS/HCC)     Asthma     BPH (benign prostatic hyperplasia)     Cellulitis, unspecified     Cellulitis    DVT (deep venous thrombosis) (CMS/HCC)     multiple    Personal history of other diseases of the circulatory system 08/16/2022    History of peripheral vascular disease    Seizure (CMS/HCC)     in the setting of fever/infection    Venous insufficiency of both lower extremities     Venous ulcer (CMS/HCC)     right foot       PAST SURGICAL HISTORY:  Past Surgical History:   Procedure Laterality Date    ANKLE SURGERY Right 11/13/2013    Ankle Surgery    CARDIAC CATHETERIZATION      CARDIAC ELECTROPHYSIOLOGY MAPPING AND ABLATION      for afib    CHOLECYSTECTOMY  11/13/2013    Cholecystectomy    COLONOSCOPY      OTHER SURGICAL HISTORY  09/23/2022    Vascular surgical procedure    ROTATOR CUFF REPAIR Bilateral 11/13/2013    Rotator Cuff Repair    WISDOM TOOTH EXTRACTION          ALLERGIES:   Allergies   Allergen Reactions    Other Anaphylaxis     Bee sting    Penicillin Anaphylaxis     per family history-2 brothers        MEDICATIONS:   Current Outpatient Medications   Medication Instructions    albuterol 90 mcg/actuation inhaler inhale 1 to 2 puffs by mouth every 4 to 6 hours if needed    aspirin 81 mg EC tablet 1 tablet, oral, Daily    carvedilol (COREG) 25 mg, oral, 2 times daily with meals    furosemide (Lasix) 80 mg tablet 1 tablet, oral, Daily    ibuprofen 200 mg tablet 4 tablets, oral, Daily    losartan (COZAAR) 25 mg, oral, Daily        PHYSICAL EXAM:  There were no  "vitals taken for this visit.  Constitutional: Patient appears well-developed and well-nourished. No distress.    Pulmonary/Chest: Effort normal. No respiratory distress.   Abdominal: Soft, ND NT  : WNL  Musculoskeletal: Normal range of motion.    Neurological: Alert and oriented to person, place, and time.  Psychiatric: Normal mood and affect. Behavior is normal. Thought content normal.      Labs:  No results found for: \"TESTOSTERONE\"  Lab Results   Component Value Date    PSA 4.83 (H) 11/13/2023     No components found for: \"CBC\"  Lab Results   Component Value Date    CREATININE 0.99 01/08/2024     No components found for: \"TESTOTMS\"  No results found for: \"TESTF\"    Imaging: prostate MRI 12/7/23 demonstrated 0.9cm left paramedial peripheral zone lesion at the midgland, which demonstrates diffusion restriction and early focal enhancement (PI-RADS 4). No gross extracapsular extension. No pelvic lymphadenopathy.    Surgical pathology: Prostatic adenocarcinoma, Adore score 4+3=7, Grade Group 3, involving 5 of 5 fragmented cores and approximately 95% of the overall specimen on 1/5/24    Discussion: I had a long discussion with him and his family regarding the different treatment options. We discussed all the different options in detail including the different radiation therapy options of IMRT and brachytherapy seeds. We also discussed the surgical option of robotic radical prostatectomy with bilateral pelvic lymph node dissection. We reviewed the role of active surveillance in detail. I discussed all the different risks, benefits, and alternatives of each one of these in detail, and I answered their questions to their satisfaction. I also gave him some literature to review.       Assessment:    No diagnosis found.    Domingo Greer is a 63 y.o. male here for NPV     Plan:   1) At this time he would like to think about his options. Radiation would be the least amount of downtime for him but he understands that " there are potential side effects he may experience in the future. He will contact us when he has decided what he would like to do.    All questions and concerns were addressed. Patient verbalizes understanding and has no other questions at this time.     Scribe Attestation  By signing my name below, IFelicia Scribe   attest that this documentation has been prepared under the direction and in the presence of Maik Steele MD.

## 2024-02-28 ENCOUNTER — OFFICE VISIT (OUTPATIENT)
Dept: UROLOGY | Facility: HOSPITAL | Age: 64
End: 2024-02-28
Payer: COMMERCIAL

## 2024-02-28 DIAGNOSIS — C61 MALIGNANT NEOPLASM OF PROSTATE (MULTI): Primary | ICD-10-CM

## 2024-02-28 PROCEDURE — 99213 OFFICE O/P EST LOW 20 MIN: CPT | Performed by: STUDENT IN AN ORGANIZED HEALTH CARE EDUCATION/TRAINING PROGRAM

## 2024-02-28 PROCEDURE — 1036F TOBACCO NON-USER: CPT | Performed by: STUDENT IN AN ORGANIZED HEALTH CARE EDUCATION/TRAINING PROGRAM

## 2024-02-28 NOTE — PROGRESS NOTES
63 y.o. male who presents for prostatic biopsy result interpretation which revealed prostatic adenocarcinoma     The prostatic biopsy, conducted on 01/05/2024, revealed Prostatic adenocarcinoma  Coden score 7 (4+3), Grade group 3     Review of Systems     All systems were reviewed. Anything negative was noted in the HPI.     Objective   Physical Exam     General: Well developed, well nourished, alert and cooperative, appears in no acute distress   Eyes: Non-injected conjunctiva, sclera clear, no proptosis   Cardiac: Extremities are warm and well perfused. No edema, cyanosis or pallor   Lungs: Breathing is easy, non-labored. Speaking in clear and complete sentences. Normal diaphragmatic movement   MSK: Ambulatory with steady gait, unassisted   Neuro: Alert and oriented to person, place, and time   Psych: Demonstrates good judgment and reason, without hallucinations, abnormal affect or abnormal behaviors   Skin: No obvious lesions, no rashes       No CVA tenderness bilaterally   No suprapubic pain or discomfort         Medical History        Past Medical History:   Diagnosis Date    Afib (CMS/HCC)      Asthma      BPH (benign prostatic hyperplasia)      Cellulitis, unspecified       Cellulitis    DVT (deep venous thrombosis) (CMS/HCC)       multiple    Personal history of other diseases of the circulatory system 08/16/2022     History of peripheral vascular disease    Seizure (CMS/HCC)       in the setting of fever/infection    Venous insufficiency of both lower extremities      Venous ulcer (CMS/HCC)       right foot               Surgical History         Past Surgical History:   Procedure Laterality Date    ANKLE SURGERY Right 11/13/2013     Ankle Surgery    CARDIAC CATHETERIZATION        CARDIAC ELECTROPHYSIOLOGY MAPPING AND ABLATION         for afib    CHOLECYSTECTOMY   11/13/2013     Cholecystectomy    COLONOSCOPY        OTHER SURGICAL HISTORY   09/23/2022     Vascular surgical procedure    ROTATOR CUFF REPAIR  Bilateral 11/13/2013     Rotator Cuff Repair    WISDOM TOOTH EXTRACTION                      Assessment/Plan   High volume, Intermediate risk Prostatic adenocarcinoma G7 (4+3), Grade Group 3, no mets     Domingo Greer is a 63 y.o. male with unfavorable intermediate risk prostate cancer, [id number], [place] 4+3=7, iPSA 4.83 ng/mL. He maintains excellent performance status (ECOG 0) and has good sexual (JEB 19) and urinary (IPSS 3) function. Staging PSMA PET is scheduled for 2/12/24.     I reviewed the standard of care options for treatment of unfavorable intermediate risk prostate cancer, including surgery versus external beam radiation with concurrent/adjuvant short term ADT (4-6 months). I also discussed moderately hypofractionated radiation over 4 weeks versus SBRT in 5 fractions. I would recommend SpaceOAR hydrogel and fiducial marker placement if he chooses SBRT; he has no gross posterior MICK that would be a contraindication.    Recent PSMA scan was negative 2/12/24.    . I explained to him that such disease is typically managed with ether robotic assisted laparoscopic radical prostatectomy, external beam radiation, seeds implants, HIFU.  I explained to him however several other variables can come into play, including the age of the patient, the trend of PSA change, the volume of disease, the comorbidities, and the genomic profile to predict the chance of progression in the future. I explained to him that my preference is against active surveillance with delayed intervention given his pathology. we discussed the risk, benefit, step-by-step procedure, adverse events, side effects, outcomes, quality of life, and survival benefit of each therapy for the prostate cancer.  More specifically discussed urinary incontinence and rectal dysfunction and oncologic outcomes for each.     Plan  Pt opted for RALP  Fu with Dr. Steele

## 2024-02-29 ENCOUNTER — TELEPHONE (OUTPATIENT)
Dept: UROLOGY | Facility: HOSPITAL | Age: 64
End: 2024-02-29
Payer: COMMERCIAL

## 2024-02-29 NOTE — TELEPHONE ENCOUNTER
Patient called and left message requesting sooner surgery date. I called patient at [phone] to inform him Aury is out Today and Friday and will be contacting him Monday with a surgery date.

## 2024-03-04 ENCOUNTER — PREP FOR PROCEDURE (OUTPATIENT)
Dept: UROLOGY | Facility: HOSPITAL | Age: 64
End: 2024-03-04
Payer: COMMERCIAL

## 2024-03-06 ENCOUNTER — APPOINTMENT (OUTPATIENT)
Dept: UROLOGY | Facility: HOSPITAL | Age: 64
End: 2024-03-06
Payer: COMMERCIAL

## 2024-03-08 ENCOUNTER — TELEPHONE (OUTPATIENT)
Dept: UROLOGY | Facility: HOSPITAL | Age: 64
End: 2024-03-08
Payer: COMMERCIAL

## 2024-03-08 DIAGNOSIS — C61 MALIGNANT NEOPLASM OF PROSTATE (MULTI): ICD-10-CM

## 2024-03-08 NOTE — TELEPHONE ENCOUNTER
Patient wishes for surgery date after the summer. Discussed with Dr. Steele and ok to push surgery back. Patient will return to clinic in 6 months with a PSA prior. Patient aware of plan and verbalized understanding.     Aury Villatoro RN

## 2024-03-27 LAB
AP SUMMARY REPORT: NORMAL
SCAN RESULT: NORMAL

## 2024-04-26 ENCOUNTER — PREP FOR PROCEDURE (OUTPATIENT)
Dept: UROLOGY | Facility: HOSPITAL | Age: 64
End: 2024-04-26
Payer: COMMERCIAL

## 2024-04-26 ENCOUNTER — TELEPHONE (OUTPATIENT)
Dept: UROLOGY | Facility: HOSPITAL | Age: 64
End: 2024-04-26
Payer: COMMERCIAL

## 2024-04-26 DIAGNOSIS — C61 PROSTATE CANCER (MULTI): Primary | ICD-10-CM

## 2024-04-26 RX ORDER — SODIUM CHLORIDE 9 MG/ML
100 INJECTION, SOLUTION INTRAVENOUS CONTINUOUS
OUTPATIENT
Start: 2024-04-26

## 2024-04-26 RX ORDER — ACETAMINOPHEN 325 MG/1
975 TABLET ORAL ONCE
OUTPATIENT
Start: 2024-10-14

## 2024-04-26 RX ORDER — CELECOXIB 400 MG/1
400 CAPSULE ORAL ONCE
OUTPATIENT
Start: 2024-10-14

## 2024-04-26 RX ORDER — HEPARIN SODIUM 5000 [USP'U]/ML
5000 INJECTION, SOLUTION INTRAVENOUS; SUBCUTANEOUS ONCE
OUTPATIENT
Start: 2024-10-14

## 2024-04-26 RX ORDER — GABAPENTIN 600 MG/1
600 TABLET ORAL ONCE
OUTPATIENT
Start: 2024-10-14

## 2024-04-26 RX ORDER — CIPROFLOXACIN 2 MG/ML
400 INJECTION, SOLUTION INTRAVENOUS ONCE
OUTPATIENT
Start: 2024-10-14

## 2024-04-26 NOTE — TELEPHONE ENCOUNTER
Surgery teaching complete. He wanted to wait until after the summer so he is scheduled for 10/14/24. He will be scheduled with PAT and is aware.     He will see Dr. Steele in August for follow up.    Aury Villatoro RN

## 2024-05-01 ENCOUNTER — LAB (OUTPATIENT)
Dept: LAB | Facility: LAB | Age: 64
End: 2024-05-01
Payer: COMMERCIAL

## 2024-05-01 DIAGNOSIS — C61 PROSTATE CANCER (MULTI): ICD-10-CM

## 2024-05-01 PROCEDURE — 87086 URINE CULTURE/COLONY COUNT: CPT

## 2024-05-02 LAB — BACTERIA UR CULT: NORMAL

## 2024-06-03 ENCOUNTER — LAB (OUTPATIENT)
Dept: LAB | Facility: LAB | Age: 64
End: 2024-06-03
Payer: COMMERCIAL

## 2024-06-03 DIAGNOSIS — C61 PROSTATE CANCER (MULTI): ICD-10-CM

## 2024-06-03 DIAGNOSIS — C61 MALIGNANT NEOPLASM OF PROSTATE (MULTI): ICD-10-CM

## 2024-06-03 LAB
ABO GROUP (TYPE) IN BLOOD: NORMAL
ANION GAP SERPL CALC-SCNC: 10 MMOL/L (ref 10–20)
ANTIBODY SCREEN: NORMAL
APTT PPP: 29 SECONDS (ref 27–38)
BUN SERPL-MCNC: 30 MG/DL (ref 6–23)
CALCIUM SERPL-MCNC: 9.3 MG/DL (ref 8.6–10.3)
CHLORIDE SERPL-SCNC: 100 MMOL/L (ref 98–107)
CO2 SERPL-SCNC: 31 MMOL/L (ref 21–32)
CREAT SERPL-MCNC: 1.17 MG/DL (ref 0.5–1.3)
EGFRCR SERPLBLD CKD-EPI 2021: 70 ML/MIN/1.73M*2
ERYTHROCYTE [DISTWIDTH] IN BLOOD BY AUTOMATED COUNT: 12.1 % (ref 11.5–14.5)
GLUCOSE SERPL-MCNC: 104 MG/DL (ref 74–99)
HCT VFR BLD AUTO: 49.2 % (ref 41–52)
HGB BLD-MCNC: 17.1 G/DL (ref 13.5–17.5)
INR PPP: 1 (ref 0.9–1.1)
MCH RBC QN AUTO: 31.9 PG (ref 26–34)
MCHC RBC AUTO-ENTMCNC: 34.8 G/DL (ref 32–36)
MCV RBC AUTO: 92 FL (ref 80–100)
NRBC BLD-RTO: 0 /100 WBCS (ref 0–0)
PLATELET # BLD AUTO: 222 X10*3/UL (ref 150–450)
POTASSIUM SERPL-SCNC: 4.4 MMOL/L (ref 3.5–5.3)
PROTHROMBIN TIME: 11.5 SECONDS (ref 9.8–12.8)
PSA SERPL-MCNC: 7.82 NG/ML
RBC # BLD AUTO: 5.36 X10*6/UL (ref 4.5–5.9)
RH FACTOR (ANTIGEN D): NORMAL
SODIUM SERPL-SCNC: 137 MMOL/L (ref 136–145)
WBC # BLD AUTO: 6.3 X10*3/UL (ref 4.4–11.3)

## 2024-06-03 PROCEDURE — 85027 COMPLETE CBC AUTOMATED: CPT

## 2024-06-03 PROCEDURE — 86850 RBC ANTIBODY SCREEN: CPT

## 2024-06-03 PROCEDURE — 80048 BASIC METABOLIC PNL TOTAL CA: CPT

## 2024-06-03 PROCEDURE — 86901 BLOOD TYPING SEROLOGIC RH(D): CPT

## 2024-06-03 PROCEDURE — 84153 ASSAY OF PSA TOTAL: CPT

## 2024-06-03 PROCEDURE — 85730 THROMBOPLASTIN TIME PARTIAL: CPT

## 2024-06-03 PROCEDURE — 36415 COLL VENOUS BLD VENIPUNCTURE: CPT

## 2024-06-03 PROCEDURE — 85610 PROTHROMBIN TIME: CPT

## 2024-06-03 PROCEDURE — 86900 BLOOD TYPING SEROLOGIC ABO: CPT

## 2024-06-04 DIAGNOSIS — I42.0 DILATED CARDIOMYOPATHY (MULTI): ICD-10-CM

## 2024-06-04 DIAGNOSIS — I48.0 PAROXYSMAL ATRIAL FIBRILLATION (MULTI): ICD-10-CM

## 2024-06-04 RX ORDER — CARVEDILOL 25 MG/1
25 TABLET ORAL
Qty: 180 TABLET | Refills: 3 | Status: SHIPPED | OUTPATIENT
Start: 2024-06-04 | End: 2025-06-04

## 2024-07-02 PROBLEM — M25.569 ARTHRALGIA OF KNEE: Status: ACTIVE | Noted: 2023-08-29

## 2024-07-02 PROBLEM — R06.09 DYSPNEA ON EXERTION: Status: ACTIVE | Noted: 2023-08-29

## 2024-07-02 PROBLEM — R79.89 ABNORMAL LIVER FUNCTION TESTS: Status: ACTIVE | Noted: 2018-11-20

## 2024-07-02 PROBLEM — L03.90 CELLULITIS: Status: ACTIVE | Noted: 2018-11-20

## 2024-07-02 PROBLEM — R69 DISEASE SUSPECTED: Status: ACTIVE | Noted: 2023-08-29

## 2024-07-02 PROBLEM — L98.9 SKIN LESION: Status: ACTIVE | Noted: 2023-08-29

## 2024-07-02 PROBLEM — R17 UNSPECIFIED JAUNDICE: Status: ACTIVE | Noted: 2023-06-21

## 2024-07-02 PROBLEM — J45.901 EXACERBATION OF ASTHMA (HHS-HCC): Status: ACTIVE | Noted: 2018-11-20

## 2024-07-02 PROBLEM — G56.00 CARPAL TUNNEL SYNDROME: Status: ACTIVE | Noted: 2024-07-02

## 2024-07-02 RX ORDER — PANTOPRAZOLE SODIUM 40 MG/1
TABLET, DELAYED RELEASE ORAL
COMMUNITY
Start: 2022-11-14 | End: 2024-07-10 | Stop reason: WASHOUT

## 2024-07-06 PROBLEM — I50.32 HEART FAILURE WITH IMPROVED EJECTION FRACTION (HFIMPEF) (MULTI): Status: ACTIVE | Noted: 2024-07-06

## 2024-07-06 ASSESSMENT — ENCOUNTER SYMPTOMS
NEAR-SYNCOPE: 0
COUGH: 0
IRREGULAR HEARTBEAT: 0
CHILLS: 0
SYNCOPE: 0
VOMITING: 0
WHEEZING: 0
FEVER: 0
ORTHOPNEA: 0
NAUSEA: 0
HEMATOCHEZIA: 0
ALTERED MENTAL STATUS: 0
PALPITATIONS: 0
DYSPNEA ON EXERTION: 0
HEMATURIA: 0

## 2024-07-06 NOTE — PROGRESS NOTES
Chief Complaint/Reason for Visit:  No chief complaint on file. 6 month cardiovascular follow up    History Of Present Illness:    Domingo Greer is a 63 y.o. male that presents to the office for 6 month follow up.    Taking medications as prescribed.     PMH is significant for atrial fibrillation s/p RFA Oct 2022 with Dr. Yeager, asthma, recurrent DVTs in his 20's/30's, suspected sleep apnea, and chronic systolic heart failure.     Past Medical History:  He has a past medical history of Afib (Multi), Asthma ([facility]), BPH (benign prostatic hyperplasia), Cellulitis, unspecified, DVT (deep venous thrombosis) (Multi), Personal history of other diseases of the circulatory system (08/16/2022), Seizure (Multi), Venous insufficiency of both lower extremities, and Venous ulcer (Multi).    Past Surgical History:  He has a past surgical history that includes Rotator cuff repair (Bilateral, 11/13/2013); Cholecystectomy (11/13/2013); Ankle surgery (Right, 11/13/2013); Other surgical history (09/23/2022); Cardiac electrophysiology mapping and ablation; Colonoscopy; Cardiac catheterization; and Ethel tooth extraction.      Social History:  He reports that he has quit smoking. His smoking use included cigarettes. He has never used smokeless tobacco. He reports current alcohol use of about 2.0 standard drinks of alcohol per week. He reports that he does not use drugs.    Family History:  Family History   Problem Relation Name Age of Onset    Hypertension Mother      Prostate cancer Father          Allergies:  Other, Penicillin, and Bee venom protein (honey bee)    Review of Systems   Constitutional: Negative for chills and fever.   Cardiovascular:  Negative for chest pain, dyspnea on exertion, irregular heartbeat, leg swelling, near-syncope, orthopnea, palpitations and syncope.   Respiratory:  Positive for shortness of breath (intermittent with asthma flare up). Negative for cough and wheezing.    Gastrointestinal:  Negative for  hematochezia, melena, nausea and vomiting.   Genitourinary:  Negative for hematuria.   Psychiatric/Behavioral:  Negative for altered mental status.        Objective      Vitals reviewed.   Constitutional:       Appearance: Healthy appearance.   Pulmonary:      Effort: Pulmonary effort is normal.      Breath sounds: Normal breath sounds.   Cardiovascular:      PMI at left midclavicular line. Normal rate. Regular rhythm. S1 with normal intensity. S2 with normal intensity.       Murmurs: There is no murmur.   Edema:     Peripheral edema absent.   Abdominal:      General: Bowel sounds are normal.   Skin:     General: Skin is warm and dry.   Psychiatric:         Attention and Perception: Attention normal.         Mood and Affect: Mood normal.         Behavior: Behavior is cooperative.         Current Outpatient Medications   Medication Instructions    albuterol 90 mcg/actuation inhaler inhale 1 to 2 puffs by mouth every 4 to 6 hours if needed    aspirin 81 mg EC tablet 1 tablet, oral, Daily    carvedilol (COREG) 25 mg, oral, 2 times daily (morning and late afternoon)    furosemide (LASIX) 80 mg, oral, Daily    ibuprofen 200 mg tablet 4 tablets, oral, Daily    losartan (COZAAR) 25 mg, oral, Daily        Last Labs:  CBC -  Lab Results   Component Value Date    WBC 6.3 06/03/2024    HGB 17.1 06/03/2024    HCT 49.2 06/03/2024    MCV 92 06/03/2024     06/03/2024       RENAL FUNCTION PANEL -   Lab Results   Component Value Date    GLUCOSE 104 (H) 06/03/2024     06/03/2024    K 4.4 06/03/2024     06/03/2024    CO2 31 06/03/2024    ANIONGAP 10 06/03/2024    BUN 30 (H) 06/03/2024    CREATININE 1.17 06/03/2024    GFRMALE 83 06/01/2023    CALCIUM 9.3 06/03/2024    ALBUMIN 4.5 01/08/2024        CMP -  Lab Results   Component Value Date    CALCIUM 9.3 06/03/2024    PROT 6.8 01/08/2024    ALBUMIN 4.5 01/08/2024    AST 35 01/08/2024    ALT 49 01/08/2024    ALKPHOS 51 01/08/2024    BILITOT 1.1 01/08/2024       LIPID  PANEL -   Lab Results   Component Value Date    CHOL 190 01/08/2024    TRIG 115 01/08/2024    HDL 45.6 01/08/2024    CHHDL 4.2 01/08/2024    LDLF 97 06/23/2022    VLDL 23 01/08/2024    NHDL 144 01/08/2024     Lab Results   Component Value Date    LDLCALC 121 (H) 01/08/2024       Lab Results   Component Value Date    BNP 38 01/08/2024    HGBA1C 5.8 (A) 06/23/2022       Lab Results   Component Value Date    TSH 1.69 01/08/2024       No results found for this or any previous visit.     Last Cardiology Tests:    Echo-limited 11/9/23:   1. Left ventricular systolic function is normal with a 55% estimated ejection fraction.     TTE 6/21/23  1. Left ventricular systolic function is low normal with a 45-50% estimated ejection fraction.  2. Abnormal strain pattern with relative apical sparing (although not entirely classical).  3. Moderately increased left ventricular septal thickness.  4. Mild aortic valve regurgitation.  5. Left Ventricular Global Longitudinal Strain - 15.3 %.    LHC 7/29/22 showed no evidence of CAD.     TTE 7/18/22 showed LV systolic function is severely decreased with a LVEF 30-35%. Severely enlarged RV. Mild aortic valve stenosis.     Visit Vitals  /80 (BP Location: Right arm, Patient Position: Sitting)   Pulse 72   Ht 1.829 m (6')   Wt 120 kg (264 lb)   BMI 35.80 kg/m²   Smoking Status Former   BSA 2.47 m²       Assessment/Plan   The primary encounter diagnosis was Heart failure with improved ejection fraction (HFimpEF) (Multi). Diagnoses of Paroxysmal atrial fibrillation (Multi) and Dilated cardiomyopathy (Multi) were also pertinent to this visit.    1. HFimpEF  Not volume overloaded on exam  Continue current GDMT which includes:  Beta blocker: Carvedilol 25 mg BID  ACE inhibitor/ARB/ARNI: Losartan 25 mg daily  MRA: None  SGLT2i:  None  Diuretic: Furosemide 80 mg daily  Device: None  TTE July 2022 with LVEF 35% (? Rate related cardiomyopathy)  [facility] July 2022 with no significant CAD  TTE Nov 2023  with LVEF 55%     2. Paroxysmal atrial fibrillation s/p RFA Oct 2022 with Dr. Yeager   [id number] score is 2. (Hypertension - Yes, 1 point, Congestive Heart Failure  or Left ventricular Dysfunction - Yes, 1 point, and Age - less than 65 years - 0 points)  Anticoagulation d/c previously. He races cars, rides motorcycles and mountain bikes.         Erika Deng, APRN-CNP

## 2024-07-06 NOTE — PATIENT INSTRUCTIONS
Recommend Mediterranean style of eating  Continue current medications  Follow-up with Dr. Brooks in 6 months  If you have any questions or cardiac concerns, please call our office at [phone].

## 2024-07-10 ENCOUNTER — APPOINTMENT (OUTPATIENT)
Dept: CARDIOLOGY | Facility: CLINIC | Age: 64
End: 2024-07-10
Payer: COMMERCIAL

## 2024-07-10 VITALS
BODY MASS INDEX: 35.76 KG/M2 | HEART RATE: 72 BPM | SYSTOLIC BLOOD PRESSURE: 130 MMHG | HEIGHT: 72 IN | DIASTOLIC BLOOD PRESSURE: 80 MMHG | WEIGHT: 264 LBS

## 2024-07-10 DIAGNOSIS — I48.0 PAROXYSMAL ATRIAL FIBRILLATION (MULTI): ICD-10-CM

## 2024-07-10 DIAGNOSIS — I50.32 HEART FAILURE WITH IMPROVED EJECTION FRACTION (HFIMPEF) (MULTI): Primary | ICD-10-CM

## 2024-07-10 DIAGNOSIS — I42.0 DILATED CARDIOMYOPATHY (MULTI): ICD-10-CM

## 2024-07-10 PROCEDURE — 99213 OFFICE O/P EST LOW 20 MIN: CPT | Performed by: NURSE PRACTITIONER

## 2024-07-10 PROCEDURE — 1036F TOBACCO NON-USER: CPT | Performed by: NURSE PRACTITIONER

## 2024-07-10 RX ORDER — FUROSEMIDE 80 MG/1
80 TABLET ORAL DAILY
Qty: 90 TABLET | Refills: 2 | Status: SHIPPED | OUTPATIENT
Start: 2024-07-10 | End: 2025-04-06

## 2024-07-10 ASSESSMENT — ENCOUNTER SYMPTOMS: SHORTNESS OF BREATH: 1

## 2024-08-14 NOTE — PROGRESS NOTES
FUV    Last Visit 2/16/24    HISTORY OF PRESENT ILLNESS:   Domingo Greer is a 63 y.o. male who is being seen today for continued discussion on treatment options for prostate cancer. Kindly referred by Dr Gaspar    PAST MEDICAL HISTORY:  Past Medical History:   Diagnosis Date    Afib (Multi)     Asthma ([facility]-HCC)     BPH (benign prostatic hyperplasia)     Cellulitis, unspecified     Cellulitis    DVT (deep venous thrombosis) (Multi)     multiple    Personal history of other diseases of the circulatory system 08/16/2022    History of peripheral vascular disease    Seizure (Multi)     in the setting of fever/infection    Venous insufficiency of both lower extremities     Venous ulcer (Multi)     right foot   - Prostate MRI on 12/7/23 demonstrated 0.9cm left paramedial peripheral zone lesion at the midgland, which demonstrates diffusion restriction and early focal enhancement (PI-RADS 4). No gross extracapsular extension. No pelvic lymphadenopathy.  - Adore score 4+3=7, Grade Group 3, involving 5 of 5 fragmented cores and approximately 95% of the overall specimen dx on 1/5/24  - He does have low testosterone and has been on HRT for the last 13 years.  - Prior hx of laparoscopic gall bladder removal.     PAST SURGICAL HISTORY:  Past Surgical History:   Procedure Laterality Date    ANKLE SURGERY Right 11/13/2013    Ankle Surgery    CARDIAC CATHETERIZATION      CARDIAC ELECTROPHYSIOLOGY MAPPING AND ABLATION      for afib    CHOLECYSTECTOMY  11/13/2013    Cholecystectomy    COLONOSCOPY      OTHER SURGICAL HISTORY  09/23/2022    Vascular surgical procedure    ROTATOR CUFF REPAIR Bilateral 11/13/2013    Rotator Cuff Repair    WISDOM TOOTH EXTRACTION          ALLERGIES:   Allergies   Allergen Reactions    Other Anaphylaxis     Bee sting    Penicillin Anaphylaxis     per family history-2 brothers    Bee Venom Protein (Honey Bee) Unknown        MEDICATIONS:   Current Outpatient Medications   Medication Instructions     "albuterol 90 mcg/actuation inhaler inhale 1 to 2 puffs by mouth every 4 to 6 hours if needed    aspirin 81 mg EC tablet 1 tablet, oral, Daily    carvedilol (COREG) 25 mg, oral, 2 times daily (morning and late afternoon)    furosemide (LASIX) 80 mg, oral, Daily    ibuprofen 200 mg tablet 4 tablets, oral, Daily    losartan (COZAAR) 25 mg, oral, Daily        PHYSICAL EXAM:  There were no vitals taken for this visit.  Constitutional: Patient appears well-developed and well-nourished. No distress.    Pulmonary/Chest: Effort normal. No respiratory distress.   Abdominal: Soft, ND NT  : WNL  Musculoskeletal: Normal range of motion.    Neurological: Alert and oriented to person, place, and time.  Psychiatric: Normal mood and affect. Behavior is normal. Thought content normal.      Labs:  No results found for: \"TESTOSTERONE\"  Lab Results   Component Value Date    PSA 7.82 (H) 06/03/2024     No components found for: \"CBC\"  Lab Results   Component Value Date    CREATININE 1.17 06/03/2024     No components found for: \"TESTOTMS\"  No results found for: \"TESTF\"    Imaging:   - MRI of prostate on 12/7/23 demonstrated 0.9cm PI RADS 4 lesions in the left paramedial peripheral zone lesion at the midgland, which demonstrates diffusion restriction and early focal enhancement.  - PSMA PET SCAN on 2/12/24 demonstrated there is no evidence for Ga68 PSMA avid pelvic lymph node  metastasis. There is no evidence for Ga68 PSMA avid distant metastatic disease.  - Results reviewed with patient. Patient reassured.      Discussion:   I had a long discussion with him and his family regarding the different treatment options. We discussed all the different options in detail including the different radiation therapy options of IMRT and brachytherapy seeds. We also discussed the surgical option of robotic radical prostatectomy with bilateral pelvic lymph node dissection. We reviewed the role of active surveillance in detail. I discussed all the " different risks, benefits, and alternatives of each one of these in detail, and I answered their questions to their satisfaction. I also gave him some literature to review. Pt has a hx of cardiac ablation 2 years ago due to A-fib but has not experienced A-fib since that time. He is not on any anticoagulation medications. Pt wishes to plan for RALP + BPLND on 10/14/24. I also encouraged the patient to continue weight loss management. Pt understands and has no other questions at this time.    Assessment:      1. PSA elevation  Measure post void residual        Domingo Greer is a 63 y.o. male here for FUV     Plan:   Will tentatively plan for RALP + BPLND on 10/14/24  All questions and concerns were addressed. Patient verbalizes understanding and has no other questions at this time.     Scribe Attestation  By signing my name below, I, Mikhail Ca   attest that this documentation has been prepared under the direction and in the presence of Maik Steele MD.

## 2024-08-15 ENCOUNTER — OFFICE VISIT (OUTPATIENT)
Dept: UROLOGY | Facility: HOSPITAL | Age: 64
End: 2024-08-15
Payer: COMMERCIAL

## 2024-08-15 DIAGNOSIS — R97.20 PSA ELEVATION: Primary | ICD-10-CM

## 2024-08-15 PROCEDURE — 1036F TOBACCO NON-USER: CPT | Performed by: UROLOGY

## 2024-08-15 PROCEDURE — 99214 OFFICE O/P EST MOD 30 MIN: CPT | Performed by: UROLOGY

## 2024-08-15 PROCEDURE — G2211 COMPLEX E/M VISIT ADD ON: HCPCS | Performed by: UROLOGY

## 2024-08-15 PROCEDURE — 51798 US URINE CAPACITY MEASURE: CPT | Performed by: UROLOGY

## 2024-09-04 ENCOUNTER — APPOINTMENT (OUTPATIENT)
Dept: UROLOGY | Facility: HOSPITAL | Age: 64
End: 2024-09-04
Payer: COMMERCIAL

## 2024-09-10 DIAGNOSIS — I42.0 DILATED CARDIOMYOPATHY (MULTI): ICD-10-CM

## 2024-09-10 DIAGNOSIS — I48.0 PAROXYSMAL ATRIAL FIBRILLATION (MULTI): ICD-10-CM

## 2024-09-10 RX ORDER — CARVEDILOL 25 MG/1
25 TABLET ORAL
Qty: 180 TABLET | Refills: 3 | Status: SHIPPED | OUTPATIENT
Start: 2024-09-10 | End: 2025-09-10

## 2024-09-20 ENCOUNTER — CLINICAL SUPPORT (OUTPATIENT)
Dept: PREADMISSION TESTING | Facility: HOSPITAL | Age: 64
End: 2024-09-20
Payer: COMMERCIAL

## 2024-09-20 NOTE — CPM/PAT NURSE NOTE
CPM/PAT Nurse Note      Name: Domingo Greer (Domingo Greer)  /Age: 1960/63 y.o.       Domingo Greer is scheduled for Resection Robot-Assisted Prostate Lymphadenectomy Laparoscopy Robot-Assisted - Bilateral  on 10/14/24      **Data Input  Past Medical History:   Diagnosis Date    Afib (Multi)     Asthma (HHS-HCC)     BPH (benign prostatic hyperplasia)     Cellulitis, unspecified     Cellulitis    DVT (deep venous thrombosis) (Multi)     multiple    Hyperlipidemia     Hypertension     Personal history of other diseases of the circulatory system 2022    History of peripheral vascular disease    Seizure (Multi)     in the setting of fever/infection    Venous insufficiency of both lower extremities     Venous ulcer (Multi)     right foot       Past Surgical History:   Procedure Laterality Date    ANKLE SURGERY Right 2013    Ankle Surgery    CARDIAC CATHETERIZATION      CARDIAC ELECTROPHYSIOLOGY MAPPING AND ABLATION      for afib    CHOLECYSTECTOMY  2013    Cholecystectomy    COLONOSCOPY      OTHER SURGICAL HISTORY  2022    Vascular surgical procedure    ROTATOR CUFF REPAIR Bilateral 2013    Rotator Cuff Repair    WISDOM TOOTH EXTRACTION         Patient  has no history on file for sexual activity.    Family History   Problem Relation Name Age of Onset    Hypertension Mother      Prostate cancer Father         Allergies   Allergen Reactions    Other Anaphylaxis     Bee sting    Penicillin Anaphylaxis     per family history-2 brothers    Bee Venom Protein (Honey Bee) Unknown       Prior to Admission medications    Medication Sig Start Date End Date Taking? Authorizing Provider   albuterol 90 mcg/actuation inhaler inhale 1 to 2 puffs by mouth every 4 to 6 hours if needed 22   Historical Provider, MD   aspirin 81 mg EC tablet Take 1 tablet (81 mg) by mouth once daily. 22   Historical Provider, MD   carvedilol (Coreg) 25 mg tablet Take 1 tablet (25 mg) by mouth 2 times  daily (morning and late afternoon). 9/10/24 9/10/25  FATUMA Tejada-CNP   furosemide (Lasix) 80 mg tablet Take 1 tablet (80 mg) by mouth once daily. 7/10/24 4/6/25  JP Tejada   ibuprofen 200 mg tablet Take 4 tablets (800 mg) by mouth once daily.    Historical Provider, MD   losartan (Cozaar) 25 mg tablet Take 1 tablet (25 mg) by mouth once daily. 11/22/23 11/21/24  Venancio Brooks MD        PAT ROS     DASI Risk Score    No data to display       Caprini DVT Assessment    No data to display       Modified Frailty Index    No data to display       CHADS2 Stroke Risk  Current as of 6 hours ago        2.8% 3 to 100%: High Risk   2 to < 3%: Medium Risk   0 to < 2%: Low Risk     No Change          This score determines the patient's risk of having a stroke if the patient has atrial fibrillation.          Points Metrics   1 Has Congestive Heart Failure:  Yes     Patients with congestive heart failure get 1 point.    Current as of 6 hours ago   0 Has Hypertension:  No     Patients with hypertension get 1 point.    Current as of 6 hours ago   0 Age:  63     Patients who are 75 years of age or older get 1 point.    Current as of 6 hours ago   0 Has Diabetes:  No     Patients with diabetes get 1 point.    Current as of 6 hours ago   0 Had Stroke:  No  Had TIA:  No  Had Thromboembolism:  No     Patients who have had a stroke, TIA, or thromboembolism get 2 points.    Current as of 6 hours ago             Revised Cardiac Risk Index    No data to display       Apfel Simplified Score    No data to display       Risk Analysis Index Results This Encounter    No data found in the last 1 encounters.     Providers:                                                                                                             Cardiology: Erika Deng CNP 07/10/24 Follow up h/o Heart failure with improved ejection fraction (HFi  Systolic, atrial fibrillation s/p RFA Oct 2022 with Dr. Yeager, Dilated cardiomyopathy       PCP: India Villagran CNP, LOV 2023 h/o atrial fibrillation s/p RFA Oct 2022 with Dr. Yeager, asthma, recurrent DVTs in his 20's/30's, suspected sleep apnea, and chronic systolic heart failure     RadOnc: Alex Mills 24 Evaluation and management of unfavorable intermediate risk prostate cancer, [id number], Adore 4+3=7, iPSA 4.83 ng/mL. 24, Dr. Gordy Gaspar performed TRUS biopsy with targeting of the lesion seen on MRI. 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     Urology: Maik Steele 08/15/24 presents for continued discussion on treatment options for prostate cancer. Prostate MRI on 23 demonstrated 0.9cm left paramedial peripheral zone lesion at the midgland, which demonstrates diffusion restriction and early focal enhancement (PI-RADS 4).         --TESTING:        - EK24  Sinus Rhythm with 1st degree AV Block  Left axis deviation   Moderate voltage criteria for LVH, may be normal variant  Abnormal ECG      - Echo:23  CONCLUSIONS:   1. Left ventricular systolic function is normal with a 55% estimated ejection fraction.      - PET Prostate: 24  IMPRESSION:  1. Heterogenous multi focal increased Ga68 PSMA uptake involving the  apical right, left lateral, mid basal, mid apical and right lateral  regions of the prostatic gland, compatible with biopsy-proven  prostate cancer.  2. There is no evidence for Ga68 PSMA avid pelvic lymph node  metastasis.  3. There is no evidence for Ga68 PSMA avid distant metastatic disease.      - Cardiac Cath: 22  CONCLUSIONS:   1. No evidence of coronary artery disease.   2. Left Ventricular end-diastolic pressure = 16.   3. Normal LV filling pressures.        - PSA: 7.82 on 24        ________________________________________________________________  **Data input only. No medications, history or providers verified         Linnea Madden LPN  Preadmission Testing          Nurse Plan of Action:   Cardiac  recommendation requested

## 2024-09-27 ENCOUNTER — PRE-ADMISSION TESTING (OUTPATIENT)
Dept: PREADMISSION TESTING | Facility: HOSPITAL | Age: 64
End: 2024-09-27
Payer: COMMERCIAL

## 2024-09-27 VITALS
SYSTOLIC BLOOD PRESSURE: 132 MMHG | HEIGHT: 72 IN | HEART RATE: 72 BPM | BODY MASS INDEX: 35.89 KG/M2 | OXYGEN SATURATION: 95 % | DIASTOLIC BLOOD PRESSURE: 74 MMHG | WEIGHT: 265 LBS | TEMPERATURE: 98.4 F

## 2024-09-27 DIAGNOSIS — C61 PROSTATE CANCER (MULTI): ICD-10-CM

## 2024-09-27 DIAGNOSIS — Z01.818 PREOPERATIVE EXAMINATION: Primary | ICD-10-CM

## 2024-09-27 LAB
ABO GROUP (TYPE) IN BLOOD: NORMAL
ANION GAP SERPL CALC-SCNC: 15 MMOL/L (ref 10–20)
ANTIBODY SCREEN: NORMAL
APPEARANCE UR: CLEAR
BILIRUB UR STRIP.AUTO-MCNC: NEGATIVE MG/DL
BUN SERPL-MCNC: 32 MG/DL (ref 6–23)
CALCIUM SERPL-MCNC: 9.5 MG/DL (ref 8.6–10.6)
CHLORIDE SERPL-SCNC: 99 MMOL/L (ref 98–107)
CO2 SERPL-SCNC: 28 MMOL/L (ref 21–32)
COLOR UR: NORMAL
CREAT SERPL-MCNC: 1.05 MG/DL (ref 0.5–1.3)
EGFRCR SERPLBLD CKD-EPI 2021: 80 ML/MIN/1.73M*2
ERYTHROCYTE [DISTWIDTH] IN BLOOD BY AUTOMATED COUNT: 11.9 % (ref 11.5–14.5)
GLUCOSE SERPL-MCNC: 83 MG/DL (ref 74–99)
GLUCOSE UR STRIP.AUTO-MCNC: NORMAL MG/DL
HCT VFR BLD AUTO: 49.2 % (ref 41–52)
HGB BLD-MCNC: 16.8 G/DL (ref 13.5–17.5)
KETONES UR STRIP.AUTO-MCNC: NEGATIVE MG/DL
LEUKOCYTE ESTERASE UR QL STRIP.AUTO: NEGATIVE
MCH RBC QN AUTO: 32 PG (ref 26–34)
MCHC RBC AUTO-ENTMCNC: 34.1 G/DL (ref 32–36)
MCV RBC AUTO: 94 FL (ref 80–100)
NITRITE UR QL STRIP.AUTO: NEGATIVE
NRBC BLD-RTO: 0 /100 WBCS (ref 0–0)
PH UR STRIP.AUTO: 6 [PH]
PLATELET # BLD AUTO: 246 X10*3/UL (ref 150–450)
POTASSIUM SERPL-SCNC: 4.4 MMOL/L (ref 3.5–5.3)
PROT UR STRIP.AUTO-MCNC: NEGATIVE MG/DL
RBC # BLD AUTO: 5.25 X10*6/UL (ref 4.5–5.9)
RBC # UR STRIP.AUTO: NEGATIVE /UL
RH FACTOR (ANTIGEN D): NORMAL
SODIUM SERPL-SCNC: 138 MMOL/L (ref 136–145)
SP GR UR STRIP.AUTO: 1.02
UROBILINOGEN UR STRIP.AUTO-MCNC: NORMAL MG/DL
WBC # BLD AUTO: 6.7 X10*3/UL (ref 4.4–11.3)

## 2024-09-27 PROCEDURE — 86901 BLOOD TYPING SEROLOGIC RH(D): CPT

## 2024-09-27 PROCEDURE — 99204 OFFICE O/P NEW MOD 45 MIN: CPT | Performed by: NURSE PRACTITIONER

## 2024-09-27 PROCEDURE — 36415 COLL VENOUS BLD VENIPUNCTURE: CPT

## 2024-09-27 PROCEDURE — 81003 URINALYSIS AUTO W/O SCOPE: CPT

## 2024-09-27 PROCEDURE — 85027 COMPLETE CBC AUTOMATED: CPT

## 2024-09-27 PROCEDURE — 80048 BASIC METABOLIC PNL TOTAL CA: CPT

## 2024-09-27 ASSESSMENT — ENCOUNTER SYMPTOMS
GASTROINTESTINAL NEGATIVE: 1
EYES NEGATIVE: 1
ENDOCRINE NEGATIVE: 1
NECK NEGATIVE: 1
MUSCULOSKELETAL NEGATIVE: 1
CONSTITUTIONAL NEGATIVE: 1
NEUROLOGICAL NEGATIVE: 1
RESPIRATORY NEGATIVE: 1
CARDIOVASCULAR NEGATIVE: 1

## 2024-09-27 ASSESSMENT — DUKE ACTIVITY SCORE INDEX (DASI)
CAN YOU RUN A SHORT DISTANCE: YES
CAN YOU HAVE SEXUAL RELATIONS: YES
CAN YOU CLIMB A FLIGHT OF STAIRS OR WALK UP A HILL: YES
DASI METS SCORE: 9.9
CAN YOU PARTICIPATE IN MODERATE RECREATIONAL ACTIVITIES LIKE GOLF, BOWLING, DANCING, DOUBLES TENNIS OR THROWING A BASEBALL OR FOOTBALL: YES
CAN YOU DO LIGHT WORK AROUND THE HOUSE LIKE DUSTING OR WASHING DISHES: YES
CAN YOU DO YARD WORK LIKE RAKING LEAVES, WEEDING OR PUSHING A MOWER: YES
TOTAL_SCORE: 58.2
CAN YOU WALK INDOORS, SUCH AS AROUND YOUR HOUSE: YES
CAN YOU DO HEAVY WORK AROUND THE HOUSE LIKE SCRUBBING FLOORS OR LIFTING AND MOVING HEAVY FURNITURE: YES
CAN YOU DO MODERATE WORK AROUND THE HOUSE LIKE VACUUMING, SWEEPING FLOORS OR CARRYING GROCERIES: YES
CAN YOU TAKE CARE OF YOURSELF (EAT, DRESS, BATHE, OR USE TOILET): YES
CAN YOU WALK A BLOCK OR TWO ON LEVEL GROUND: YES
CAN YOU PARTICIPATE IN STRENOUS SPORTS LIKE SWIMMING, SINGLES TENNIS, FOOTBALL, BASKETBALL, OR SKIING: YES

## 2024-09-27 ASSESSMENT — LIFESTYLE VARIABLES: SMOKING_STATUS: NONSMOKER

## 2024-09-27 NOTE — H&P (VIEW-ONLY)
CPM/PAT Evaluation       Name: Doimngo Greer (Domingo Greer)  /Age: 1960/63 y.o.     Visit Type:   In-Person       Chief Complaint: prostate cancer scheduled for surgery    HPI: Patient is a 63-year-old male scheduled for robotic assisted prostate resection and lymphadenectomy on 2024 for treatment of prostate cancer.  The patient is referred by Dr. Maik Steele for preoperative evaluation of atrial fibrillation status post successful radiofrequency ablation in 2022 with no recurrence, asthma that is well-controlled with no recent exacerbations, BPH, prostate cancer scheduled for surgery, CHF/dilated cardiomyopathy, provoked DVTs in his early 20s with completion of anticoagulation therapy and no recurrent episodes, hypertension, hyperlipidemia, peripheral vascular disease, CLIVE compliant with CPAP.    Past Medical History:   Diagnosis Date    Arrhythmia     s/p RFA Oct 2022 with Dr. Yeager    Asthma (HHS-HCC)     BPH (benign prostatic hyperplasia)     Cellulitis, unspecified     Cellulitis    Dilated cardiomyopathy (Multi)     DVT (deep venous thrombosis) (Multi)     multiple, several years ago, s/p DOAC    Hyperlipidemia     Hypertension     Low testosterone in male     Peripheral vascular disease (CMS-HCC)     Prostate cancer (Multi)     Seizure (Multi)     in the setting of fever/infection    Sleep apnea     CPAP    Venous insufficiency of both lower extremities     Venous ulcer (Multi)     right foot       Past Surgical History:   Procedure Laterality Date    ANKLE SURGERY Right 2013    Ankle Surgery    CARDIAC CATHETERIZATION      CARDIAC ELECTROPHYSIOLOGY MAPPING AND ABLATION      for afib    CHOLECYSTECTOMY  2013    Cholecystectomy    COLONOSCOPY      OTHER SURGICAL HISTORY  2022    Vascular surgical procedure    PROSTATE BIOPSY  2024    ROTATOR CUFF REPAIR Bilateral 2013    Rotator Cuff Repair    WISDOM TOOTH EXTRACTION         Patient  has no history  on file for sexual activity.    Family History   Problem Relation Name Age of Onset    Hypertension Mother      Prostate cancer Father         Allergies   Allergen Reactions    Other Anaphylaxis     Bee sting    Penicillin Anaphylaxis     per family history-2 brothers    Bee Venom Protein (Honey Bee) Unknown       Prior to Admission medications    Medication Sig Start Date End Date Taking? Authorizing Provider   aspirin 81 mg EC tablet Take 1 tablet (81 mg) by mouth once daily. 6/23/22  Yes Historical Provider, MD   carvedilol (Coreg) 25 mg tablet Take 1 tablet (25 mg) by mouth 2 times daily (morning and late afternoon). 9/10/24 9/10/25 Yes JP Tejada   furosemide (Lasix) 80 mg tablet Take 1 tablet (80 mg) by mouth once daily. 7/10/24 4/6/25 Yes JP Tejada   losartan (Cozaar) 25 mg tablet Take 1 tablet (25 mg) by mouth once daily. 11/22/23 11/21/24 Yes Venancio Brooks MD   albuterol 90 mcg/actuation inhaler inhale 1 to 2 puffs by mouth every 4 to 6 hours if needed 6/24/22   Historical Provider, MD   ibuprofen 200 mg tablet Take 4 tablets (800 mg) by mouth once daily.    Historical Provider, MD PRESLEY ROS:   Constitutional:   neg    Neuro/Psych:   neg    Eyes:   neg     use of corrective lenses (reading)  Ears:    tinnitus  Nose:   neg    Mouth:   neg    Throat:   neg    Neck:   neg    Cardio:   neg    Respiratory:   neg    Endocrine:   neg    GI:   neg    :   neg    Musculoskeletal:   neg    Hematologic:   neg    Skin:  neg        Physical Exam  Vitals reviewed.   Constitutional:       Appearance: Normal appearance.      Comments: goatee   HENT:      Head: Normocephalic.      Nose: Nose normal.      Mouth/Throat:      Mouth: Mucous membranes are moist.   Eyes:      Conjunctiva/sclera: Conjunctivae normal.   Neck:      Vascular: No carotid bruit.   Cardiovascular:      Rate and Rhythm: Normal rate and regular rhythm.      Pulses: Normal pulses.      Heart sounds: Normal heart  sounds.   Pulmonary:      Effort: Pulmonary effort is normal.      Breath sounds: Normal breath sounds.   Abdominal:      Palpations: Abdomen is soft.      Tenderness: There is no abdominal tenderness.   Musculoskeletal:         General: Normal range of motion.      Cervical back: Normal range of motion.      Right lower leg: No edema.      Left lower leg: No edema.   Lymphadenopathy:      Cervical: No cervical adenopathy.   Skin:     General: Skin is warm and dry.      Capillary Refill: Capillary refill takes less than 2 seconds.   Neurological:      General: No focal deficit present.      Mental Status: He is alert and oriented to person, place, and time.   Psychiatric:         Mood and Affect: Mood normal.         Behavior: Behavior normal. Behavior is cooperative.         Thought Content: Thought content normal.         Judgment: Judgment normal.          PAT AIRWAY:   Airway:     Mallampati::  III    Neck ROM::  Full  normal        Visit Vitals  /74 Comment: manual recheck in left upper arm with adult large cuff   Pulse 72   Temp 36.9 °C (98.4 °F)       DASI Risk Score      Flowsheet Row Pre-Admission Testing [facility] 9/27/2024 in [facility]   Can you take care of yourself (eat, dress, bathe, or use toilet)?  2.75 filed at 09/27/2024 1301   Can you walk indoors, such as around your house? 1.75 filed at 09/27/2024 1301   Can you walk a block or two on level ground?  2.75 filed at 09/27/2024 1301   Can you climb a flight of stairs or walk up a hill? 5.5 filed at 09/27/2024 1301   Can you run a short distance? 8 filed at 09/27/2024 1301   Can you do light work around the house like dusting or washing dishes? 2.7 filed at 09/27/2024 1301   Can you do moderate work around the house like vacuuming, sweeping floors or carrying groceries? 3.5 filed at 09/27/2024 1301   Can you do heavy work around the house like scrubbing floors or lifting and moving heavy furniture?  8 filed at 09/27/2024 1301   Can  you do yard work like raking leaves, weeding or pushing a mower? 4.5 filed at 09/27/2024 1301   Can you have sexual relations? 5.25 filed at 09/27/2024 1301   Can you participate in moderate recreational activities like golf, bowling, dancing, doubles tennis or throwing a baseball or football? 6 filed at 09/27/2024 1301   Can you participate in strenous sports like swimming, singles tennis, football, basketball, or skiing? 7.5 filed at 09/27/2024 1301   DASI SCORE 58.2 filed at 09/27/2024 1301   METS Score (Will be calculated only when all the questions are answered) 9.9 filed at 09/27/2024 1301          Capantonioi DVT Assessment      Flowsheet Row Pre-Admission Testing from 9/27/2024 in [facility]   DVT Score 14 filed at 09/27/2024 1313   Medical Factors Present cancer, chemotherapy, or previous malignancy, History of DVT/PE filed at 09/27/2024 1313   Surgical Factors Major surgery planned, lasting over 3 hours filed at 09/27/2024 1313   BMI 31-40 (Obesity) filed at 09/27/2024 1313          Modified Frailty Index      Flowsheet Row Pre-Admission Testing from 9/27/2024 in [facility]   Non-independent functional status (problems with dressing, bathing, personal grooming, or cooking) 0 filed at 09/27/2024 1314   History of diabetes mellitus  0 filed at 09/27/2024 1314   History of COPD 0 filed at 09/27/2024 1314   History of CHF No filed at 09/27/2024 1314   History of MI 0 filed at 09/27/2024 1314   History of Percutaneous Coronary Intervention, Cardiac Surgery, or Angina 0.0909 filed at 09/27/2024 1314   Hypertension requiring the use of medication  0.0909 filed at 09/27/2024 1314   Peripheral vascular disease 0.0909 filed at 09/27/2024 1314   Impaired sensorium (cognitive impairement or loss, clouding, or delirium) 0 filed at 09/27/2024 1314   TIA or CVA withouy residual deficit 0 filed at 09/27/2024 1314   Cerebrovascular accident with deficit 0 filed at 09/27/2024 1314   Modified  Frailty Index Calculator .2727 filed at 09/27/2024 1314          CHADS2 Stroke Risk  Current as of today        2.8% 3 to 100%: High Risk   2 to < 3%: Medium Risk   0 to < 2%: Low Risk     No Change          This score determines the patient's risk of having a stroke if the patient has atrial fibrillation.          Points Metrics   1 Has Congestive Heart Failure:  Yes     Patients with congestive heart failure get 1 point.    Current as of today   0 Has Hypertension:  No     Patients with hypertension get 1 point.    Current as of today   0 Age:  63     Patients who are 75 years of age or older get 1 point.    Current as of today   0 Has Diabetes:  No     Patients with diabetes get 1 point.    Current as of today   0 Had Stroke:  No  Had TIA:  No  Had Thromboembolism:  No     Patients who have had a stroke, TIA, or thromboembolism get 2 points.    Current as of today             Revised Cardiac Risk Index      Flowsheet Row Pre-Admission Testing from 9/27/2024 in [facility]   High-Risk Surgery (Intraperitoneal, Intrathoracic,Suprainguinal vascular) 1 filed at 09/27/2024 1331   History of ischemic heart disease (History of MI, History of positive exercuse test, Current chest paint considered due to myocardial ischemia, Use of nitrate therapy, ECG with pathological Q Waves) 0 filed at 09/27/2024 1331   History of congestive heart failure (pulmonary edemia, bilateral rales or S3 gallop, Paroxysmal nocturnal dyspnea, CXR showing pulmonary vascular redistribution) 1 filed at 09/27/2024 1331   History of cerebrovascular disease (Prior TIA or stroke) 0 filed at 09/27/2024 1331   Pre-operative insulin treatment 0 filed at 09/27/2024 1331   Pre-operative creatinine>2 mg/dl 0 filed at 09/27/2024 1331   Revised Cardiac Risk Calculator 2 filed at 09/27/2024 1331          Apfel Simplified Score      Flowsheet Row Pre-Admission Testing from 9/27/2024 in [facility]   Smoking status 1 filed at  09/27/2024 1314   History of motion sickness or PONV  0 filed at 09/27/2024 1314   Use of postoperative opioids 1 filed at 09/27/2024 1314   Gender - Female 0=No filed at 09/27/2024 1314   Apfel Simplified Score Calculator 2 filed at 09/27/2024 1314          Risk Analysis Index Results This Encounter    No data found in the last 10 encounters.       Stop Bang Score      Flowsheet Row Pre-Admission Testing from 9/27/2024 in [facility]   Do you snore loudly? 1 filed at 09/27/2024 1300   Do you often feel tired or fatigued after your sleep? 1 filed at 09/27/2024 1300   Has anyone ever observed you stop breathing in your sleep? 1 filed at 09/27/2024 1300   Do you have or are you being treated for high blood pressure? 1 filed at 09/27/2024 1300   Recent BMI (Calculated) 35.8 filed at 09/27/2024 1300   Is BMI greater than 35 kg/m2? 1=Yes filed at 09/27/2024 1300   Age older than 50 years old? 1=Yes filed at 09/27/2024 1300   Is your neck circumference greater than 17 inches (Male) or 16 inches (Female)? 1 filed at 09/27/2024 1300   Gender - Male 1=Yes filed at 09/27/2024 1300   STOP-BANG Total Score 8 filed at 09/27/2024 1300            Assessment and Plan:   Neuro:  No neurologic diagnoses, however, the patient is at an increased risk for post operative delirium secondary to type and duration of surgery.  Preoperative brain exercise educational handout provided to patient.    The patient is at an increased risk for perioperative stroke secondary to a-fib, cardiac disease, HTN, HLD, general anesthesia, and op time >2.5 hours.    HEENT/Airway:  No diagnosis or significant findings on chart review or clinical presentation and evaluation.    Cardiovascular: Atrial fibrillation status post radiofrequency ablation successfully in October 2022 with Dr. Yeager, managed on carvedilol (continue).      Chronic systolic heart failure/dilated cardiomyopathy, currently euvolemic and managed on furosemide  (continue).    Hypertension managed on losartan (last dose morning day before surgery) and carvedilol (continue).  Hyperlipidemia managed with diet modifications.    Peripheral vascular disease managed with 81 mg daily aspirin (continue).    Patient following with cardiology, Dr. Venancio Brooks and FATUMA Deng, last visit 07/10/2024 at which time patient was stable and to follow-up in 6 months which is scheduled for .  After reaching out via secure chat cardiology noted that patient is stable and optimized and does not require further workup prior to proceeding for surgery.    EKG 2024-Sinus rhythm with first-degree AV block    TTE 23  CONCLUSIONS:   1. Left ventricular systolic function is normal with a 55% estimated ejection fraction.    Cardiac cath 22  CONCLUSIONS:   1. No evidence of coronary artery disease.   2. Left Ventricular end-diastolic pressure = 16.   3. Normal LV filling pressures.    No additional preoperative testing is currently indicated.    METS are 9.9    RCRI  2 which is 10.1% 30 day risk of MACE (risk for cardiac death, nonfatal myocardial infarction, and nonfactal cardiac arrest    ABRAHAM score which indicates a 0.1% risk of intraoperative or 30-day postoperative MACE      Pulmonary:  Asthma that is well-controlled with no recent exacerbations, managed on as needed albuterol rescue inhaler which is used once every 2 to 3 months. CLIVE compliant with CPAP. Preoperative deep breathing educational handout provided to patient.    ARISCAT:   34   points which is a intermediate (13.3%) risk of in-hospital post-op pulmonary complications     PRODIGY:  28  points which is a high risk of post op opioid induced respiratory depression episodes    STOP BAN   points which is a high risk for moderate to severe CLIVE    Renal: BPH and prostate cancer scheduled for surgery    The patient is at increased risk of perioperative renal complications secondary to age>/= 56, male sex,  HTN, and intraperitoneal surgery. Preventative measures include preoperative BP control and hydration.    Endocrine:  No diagnosis or significant findings on chart review or clinical presentation and evaluation.    Hematologic:   provoked DVTs in his early 20s with completion of anticoagulation therapy and no recurrent episodes.  Plan for aggressive DVT prophylaxis.  Preoperative DVT educational handout provided to patient.    Caprini Score:  14  points which is a highest risk of perioperative VTE    Gastrointestinal:   No diagnosis or significant findings on chart review or clinical presentation and evaluation.    EAT-10 score of  0    - self-perceived oropharyngeal dysphagia scale (0-40)     Apfel: 2 points 39% risk for post operative N/V    Infectious disease:  No diagnosis or significant findings on chart review or clinical presentation and evaluation.     Musculoskeletal: Osteoarthritis with as needed pain relievers (hold NSAIDs 7 days).      Labs ordered  Recent Results (from the past 168 hour(s))   CBC    Collection Time: 09/27/24  1:20 PM   Result Value Ref Range    WBC 6.7 4.4 - 11.3 x10*3/uL    nRBC 0.0 0.0 - 0.0 /100 WBCs    RBC 5.25 4.50 - 5.90 x10*6/uL    Hemoglobin 16.8 13.5 - 17.5 g/dL    Hematocrit 49.2 41.0 - 52.0 %    MCV 94 80 - 100 fL    MCH 32.0 26.0 - 34.0 pg    MCHC 34.1 32.0 - 36.0 g/dL    RDW 11.9 11.5 - 14.5 %    Platelets 246 150 - 450 x10*3/uL   Basic Metabolic Panel    Collection Time: 09/27/24  1:20 PM   Result Value Ref Range    Glucose 83 74 - 99 mg/dL    Sodium 138 136 - 145 mmol/L    Potassium 4.4 3.5 - 5.3 mmol/L    Chloride 99 98 - 107 mmol/L    Bicarbonate 28 21 - 32 mmol/L    Anion Gap 15 10 - 20 mmol/L    Urea Nitrogen 32 (H) 6 - 23 mg/dL    Creatinine 1.05 0.50 - 1.30 mg/dL    eGFR 80 >60 mL/min/1.73m*2    Calcium 9.5 8.6 - 10.6 mg/dL   Type And Screen    Collection Time: 09/27/24  1:20 PM   Result Value Ref Range    ABO TYPE A     Rh TYPE POS     ANTIBODY SCREEN NEG     Urinalysis with Reflex Culture and Microscopic    Collection Time: 09/27/24  1:20 PM   Result Value Ref Range    Color, Urine Light-Yellow Light-Yellow, Yellow, Dark-Yellow    Appearance, Urine Clear Clear    Specific Gravity, Urine 1.017 1.005 - 1.035    pH, Urine 6.0 5.0, 5.5, 6.0, 6.5, 7.0, 7.5, 8.0    Protein, Urine NEGATIVE NEGATIVE, 10 (TRACE), 20 (TRACE) mg/dL    Glucose, Urine Normal Normal mg/dL    Blood, Urine NEGATIVE NEGATIVE    Ketones, Urine NEGATIVE NEGATIVE mg/dL    Bilirubin, Urine NEGATIVE NEGATIVE    Urobilinogen, Urine Normal Normal mg/dL    Nitrite, Urine NEGATIVE NEGATIVE    Leukocyte Esterase, Urine NEGATIVE NEGATIVE   Extra Urine Gray Tube    Collection Time: 09/27/24  1:20 PM   Result Value Ref Range    Extra Tube Hold for add-ons.

## 2024-09-27 NOTE — PREPROCEDURE INSTRUCTIONS
Thank you for visiting [facility] ([facility]) today for your pre-procedure evaluation, you were seen by     Samantha Meeson, MSN, NP-C  Adult-Gerontology Nurse Practitioner II  Department of Anesthesiology and Perioperative Medicine  Main phone [phone]  Direct phone [phone]  Fax [phone]     This summary includes instructions and information to aid you during your perioperative period.  Please read carefully. If you have any questions about your visit today, please call the number listed above.  If you become ill or have any changes to your health before your surgery, please contact your primary care provider and alert your surgeon.    Preparing for your Surgery       Exercises  Preoperative Deep Breathing Exercises  Why it is important to do deep breathing exercises before my surgery?  Deep breathing exercises strengthen your breathing muscles.  This helps you to recover after your surgery and decreases the chance of breathing complications.  How are the deep breathing exercises done?  Sit straight with your back supported.  Breathe in deeply and slowly through your nose. Your lower rib cage should expand and your abdomen may move forward.  Hold that breath for 3 to 5 seconds.  Breathe out through pursed lips, slowly and completely.  Rest and repeat 10 times every hour while awake.  Rest longer if you become dizzy or lightheaded.       Incentive Spirometer   You were provided with an incentive spirometer in [facility], please follow the below instructions.   You were not provided an incentive spirometer in [facility], please disregard the incentive spirometer instructions  What is an incentive spirometer?  An incentive spirometer is a device used before and after surgery to “exercise” your lungs.  It helps you to take deeper breaths to expand your lungs.  Below is an example of a basic incentive spirometer.  The device you receive may differ slightly but they all function the  same.    Why do I need to use an incentive spirometer?  Using your incentive spirometer prepares your lungs for surgery and helps prevent lung problems after surgery.  How do I use my incentive spirometer?  When you're using your incentive spirometer, make sure to breathe through your mouth. If you breathe through your nose, the incentive spirometer won't work properly. You can hold your nose if you have trouble.  If you feel dizzy at any time, stop and rest. Try again at a later time.  Follow the steps below:  Set up your incentive spirometer, expand the flexible tubing and connect to the outlet.  Sit upright in a chair or bed. Hold the incentive spirometer at eye level.   Put the mouthpiece in your mouth and close your lips tightly around it. Slowly breathe out (exhale) completely.  Breathe in (inhale) slowly through your mouth as deeply as you can. As you take a breath, you will see the piston rise inside the large column. While the piston rises, the indicator should move upwards. It should stay in between the 2 arrows (see Figure).  Try to get the piston as high as you can, while keeping the indicator between the arrows.   If the indicator doesn't stay between the arrows, you're breathing either too fast or too slow.  When you get it as high as you can, hold your breath for 10 seconds, or as long as possible. While you're holding your breath, the piston will slowly fall to the base of the spirometer.  Once the piston reaches the bottom of the spirometer, breathe out slowly through your mouth. Rest for a few seconds.  Repeat 10 times. Try to get the piston to the same level with each breath.  Repeat every hour while awake  You can carefully clean the outside of the mouthpiece with an alcohol wipe or soap and water.      Preoperative Brain Exercises    What are brain exercises?  A brain exercise is any activity that engages your thinking (cognitive) skills.    What types of activities are considered brain  exercises?  Jigsaw puzzles, crossword puzzles, word jumble, memory games, word search, and many more.  Many can be found free online or on your phone via a mobile kandy.    Why should I do brain exercises before my surgery?  More recent research has shown brain exercise before surgery can lower the risk of postoperative delirium (confusion) which can be especially important for older adults.  Patients who did brain exercises for 5 to 10 hours the days before surgery, cut their risk of postoperative delirium in half up to 1 week after surgery.    Sit-to-Stand Exercise    What is the sit-to-stand exercise?  The sit-to-stand exercise strengthens the muscles of your lower body and muscles in the center of your body (core muscles for stability) helping to maintain and improve your strength and mobility.  How do I do the sit-to-stand exercise?  The goal is to do this exercise without using your arms or hands.  If this is too difficult, use your arms and hands or a chair with armrests to help slowly push yourself to the standing position and lower yourself back to the sitting position. As the movement becomes easier use your arms and hands less.    Steps to the sit-to-stand exercise  Sit up tall in a sturdy chair, knees bent, feet flat on the floor shoulder-width apart.  Shift your hips/pelvis forward in the chair to correctly position yourself for the next movement.  Lean forward at your hips.  Stand up straight putting equal weight on both feet.  Check to be sure you are properly aligned with the chair, in a slow controlled movement sit back down.  Repeat this exercise 10-15 times.  If needed you can do it fewer times until your strength improves.  Rest for 1 minute.  Do another 10-15 sit-to-stand exercises.  Try to do this in the morning and evening.        Instructions    Preoperative Fasting Guidelines    Why must I stop eating and drinking near surgery time?  With sedation, food or liquid in your stomach can enter your  lungs causing serious complications  Food can increase nausea and vomiting  When do I need to stop eating and drinking before my surgery?      Do not eat any food after midnight the night before your surgery/procedure. You may have up to 13.5 ounces of clear liquid until TWO hours before your instructed arrival time to the hospital.  This includes water, black tea/coffee, (no milk or cream) apple juice, and electrolyte drinks (Gatorade). You may chew gum until TWO hours before your surgery/procedure            Simple things you can do to help prevent blood clots     Blood clots are blockages that can form in the body's veins. When a blood clot forms in your deep veins, it may be called a deep vein thrombosis, or DVT for short. Blood clots can happen in any part of the body where blood flows, but they are most common in the arms and legs. If a piece of a blood clot breaks free and travels to the lungs, it is called a pulmonary embolus (PE). A PE can be a very serious problem.         Being in the hospital or having surgery can raise your chances of getting a blood clot because you may not be well enough to move around as much as you normally do.         Ways you can help prevent blood clots in the hospital       Wearing SCDs  SCDs stands for Sequential Compression Devices.   SCDs are special sleeves that wrap around your legs. They attach to a pump that fills them with air to gently squeeze your legs every few minutes.  This helps return the blood in your legs to your heart.   SCDs should only be taken off when walking or bathing. SCDs may not be comfortable, but they can help save your life.              Pump SCD leg sleeves  Wearing compression stockings - if your doctor orders them. These special snug-fitting stockings gently squeeze your legs to help blood flow.       Walking. Walking helps move the blood in your legs.   If your doctor says it is ok, try walking the halls at least   5 times a day. Ask us to help  you get up, so you don't fall.      Taking any blood-thinning medicines your doctor orders.              Ways you can help prevent blood clots at home         Wearing compression stockings - if your doctor orders them.   Walking - to help move the blood in your legs.    Taking any blood-thinning medicines your doctor orders.      Signs of a blood clot or PE    Tell your doctor or nurse right away if you have any of the problems listed below.         If you are at home, seek medical care right away. Call 911 for chest pain or problems breathing.            Signs of a blood clot (DVT) - such as pain, swelling, redness, or warmth in your arm or legs.  Signs of a pulmonary embolism (PE) - such as chest pain or feeling short of breath      Tobacco and Alcohol;  Do not drink alcohol or smoke within 24 hours of surgery.  It is best to quit smoking for as long as possible before any surgery or procedure.      The Week before Surgery        Seven days before Surgery  Check your [facility] medication instructions  Do the exercises provided to you by [facility]   Arrange for a responsible, adult licensed  to take you home after surgery and stay with you for 24 hours.  You will not be permitted to drive yourself home if you have received any anesthetic/sedation  Six days before surgery  Check your [facility] medication instructions  Do the exercises provided to you by [facility]   Start using Chlorhexidene (CHG) body wash if prescribed  Five days before surgery  Check your [facility] medication instructions  Do the exercises provided to you by [facility]   Continue to use CHG body wash if prescribed  Three days before surgery  Check your [facility] medication instructions  Do the exercises provided to you by [facility]   Continue to use CHG body wash if prescribed  Two days before surgery  Check your [facility] medication instructions  Do the exercises provided to you by [facility]   Continue to use CHG body wash if prescribed    The Day before Surgery       Check your [facility] medication and  all other [facility] instructions including when to stop eating and drinking  You will be called with your arrival time for surgery in the late afternoon.  If you do not receive a call please reach out to your surgeon's office.  Do not smoke or drink 24 hours before surgery  Prepare items to bring with you to the hospital  Shower with your chlorhexidine wash if prescribed  Brush your teeth and use your chlorhexidine dental rinse if prescribed    The Day of Surgery       Check your [facility] medication instructions  Ensure you follow the instructions for when to stop eating and drinking  Shower, if prescribed use CHG.  Do not apply any lotions, creams, moisturizers, perfume or deodorant  Brush your teeth and use your CHG dental rinse if prescribed  Wear loose comfortable clothing  Avoid make-up  Remove  jewelry and piercings, consider professional piercing removal with a plastic spacer if needed  Bring photo ID and Insurance card  Bring an accurate medication list that includes medication dose, frequency and allergies  Bring a copy of your advanced directives (will, health care power of )  Bring any devices and controllers as well as medical devices you have been provided with for surgery (CPAP, slings, braces, etc.)  Dentures, eyeglasses, and contacts will be removed before surgery, please bring cases for contacts or glasses

## 2024-09-27 NOTE — CPM/PAT H&P
CPM/PAT Evaluation       Name: Domingo Greer (Domingo Greer)  /Age: 1960/63 y.o.     Visit Type:   In-Person       Chief Complaint: prostate cancer scheduled for surgery    HPI: Patient is a 63-year-old male scheduled for robotic assisted prostate resection and lymphadenectomy on 2024 for treatment of prostate cancer.  The patient is referred by Dr. Maik Steele for preoperative evaluation of atrial fibrillation status post successful radiofrequency ablation in 2022 with no recurrence, asthma that is well-controlled with no recent exacerbations, BPH, prostate cancer scheduled for surgery, CHF/dilated cardiomyopathy, provoked DVTs in his early 20s with completion of anticoagulation therapy and no recurrent episodes, hypertension, hyperlipidemia, peripheral vascular disease, CLIVE compliant with CPAP.    Past Medical History:   Diagnosis Date    Arrhythmia     s/p RFA Oct 2022 with Dr. Yeager    Asthma (HHS-HCC)     BPH (benign prostatic hyperplasia)     Cellulitis, unspecified     Cellulitis    Dilated cardiomyopathy (Multi)     DVT (deep venous thrombosis) (Multi)     multiple, several years ago, s/p DOAC    Hyperlipidemia     Hypertension     Low testosterone in male     Peripheral vascular disease (CMS-HCC)     Prostate cancer (Multi)     Seizure (Multi)     in the setting of fever/infection    Sleep apnea     CPAP    Venous insufficiency of both lower extremities     Venous ulcer (Multi)     right foot       Past Surgical History:   Procedure Laterality Date    ANKLE SURGERY Right 2013    Ankle Surgery    CARDIAC CATHETERIZATION      CARDIAC ELECTROPHYSIOLOGY MAPPING AND ABLATION      for afib    CHOLECYSTECTOMY  2013    Cholecystectomy    COLONOSCOPY      OTHER SURGICAL HISTORY  2022    Vascular surgical procedure    PROSTATE BIOPSY  2024    ROTATOR CUFF REPAIR Bilateral 2013    Rotator Cuff Repair    WISDOM TOOTH EXTRACTION         Patient  has no history  on file for sexual activity.    Family History   Problem Relation Name Age of Onset    Hypertension Mother      Prostate cancer Father         Allergies   Allergen Reactions    Other Anaphylaxis     Bee sting    Penicillin Anaphylaxis     per family history-2 brothers    Bee Venom Protein (Honey Bee) Unknown       Prior to Admission medications    Medication Sig Start Date End Date Taking? Authorizing Provider   aspirin 81 mg EC tablet Take 1 tablet (81 mg) by mouth once daily. 6/23/22  Yes Historical Provider, MD   carvedilol (Coreg) 25 mg tablet Take 1 tablet (25 mg) by mouth 2 times daily (morning and late afternoon). 9/10/24 9/10/25 Yes JP Tejada   furosemide (Lasix) 80 mg tablet Take 1 tablet (80 mg) by mouth once daily. 7/10/24 4/6/25 Yes JP Tejada   losartan (Cozaar) 25 mg tablet Take 1 tablet (25 mg) by mouth once daily. 11/22/23 11/21/24 Yes Venancio Brooks MD   albuterol 90 mcg/actuation inhaler inhale 1 to 2 puffs by mouth every 4 to 6 hours if needed 6/24/22   Historical Provider, MD   ibuprofen 200 mg tablet Take 4 tablets (800 mg) by mouth once daily.    Historical Provider, MD PRESLEY ROS:   Constitutional:   neg    Neuro/Psych:   neg    Eyes:   neg     use of corrective lenses (reading)  Ears:    tinnitus  Nose:   neg    Mouth:   neg    Throat:   neg    Neck:   neg    Cardio:   neg    Respiratory:   neg    Endocrine:   neg    GI:   neg    :   neg    Musculoskeletal:   neg    Hematologic:   neg    Skin:  neg        Physical Exam  Vitals reviewed.   Constitutional:       Appearance: Normal appearance.      Comments: goatee   HENT:      Head: Normocephalic.      Nose: Nose normal.      Mouth/Throat:      Mouth: Mucous membranes are moist.   Eyes:      Conjunctiva/sclera: Conjunctivae normal.   Neck:      Vascular: No carotid bruit.   Cardiovascular:      Rate and Rhythm: Normal rate and regular rhythm.      Pulses: Normal pulses.      Heart sounds: Normal heart  sounds.   Pulmonary:      Effort: Pulmonary effort is normal.      Breath sounds: Normal breath sounds.   Abdominal:      Palpations: Abdomen is soft.      Tenderness: There is no abdominal tenderness.   Musculoskeletal:         General: Normal range of motion.      Cervical back: Normal range of motion.      Right lower leg: No edema.      Left lower leg: No edema.   Lymphadenopathy:      Cervical: No cervical adenopathy.   Skin:     General: Skin is warm and dry.      Capillary Refill: Capillary refill takes less than 2 seconds.   Neurological:      General: No focal deficit present.      Mental Status: He is alert and oriented to person, place, and time.   Psychiatric:         Mood and Affect: Mood normal.         Behavior: Behavior normal. Behavior is cooperative.         Thought Content: Thought content normal.         Judgment: Judgment normal.          PAT AIRWAY:   Airway:     Mallampati::  III    Neck ROM::  Full  normal        Visit Vitals  /74 Comment: manual recheck in left upper arm with adult large cuff   Pulse 72   Temp 36.9 °C (98.4 °F)       DASI Risk Score      Flowsheet Row Pre-Admission Testing from 9/27/2024 in [facility]   Can you take care of yourself (eat, dress, bathe, or use toilet)?  2.75 filed at 09/27/2024 1301   Can you walk indoors, such as around your house? 1.75 filed at 09/27/2024 1301   Can you walk a block or two on level ground?  2.75 filed at 09/27/2024 1301   Can you climb a flight of stairs or walk up a hill? 5.5 filed at 09/27/2024 1301   Can you run a short distance? 8 filed at 09/27/2024 1301   Can you do light work around the house like dusting or washing dishes? 2.7 filed at 09/27/2024 1301   Can you do moderate work around the house like vacuuming, sweeping floors or carrying groceries? 3.5 filed at 09/27/2024 1301   Can you do heavy work around the house like scrubbing floors or lifting and moving heavy furniture?  8 filed at 09/27/2024 1301   Can  you do yard work like raking leaves, weeding or pushing a mower? 4.5 filed at 09/27/2024 1301   Can you have sexual relations? 5.25 filed at 09/27/2024 1301   Can you participate in moderate recreational activities like golf, bowling, dancing, doubles tennis or throwing a baseball or football? 6 filed at 09/27/2024 1301   Can you participate in strenous sports like swimming, singles tennis, football, basketball, or skiing? 7.5 filed at 09/27/2024 1301   DASI SCORE 58.2 filed at 09/27/2024 1301   METS Score (Will be calculated only when all the questions are answered) 9.9 filed at 09/27/2024 1301          Capantonioi DVT Assessment      Flowsheet Row Pre-Admission Testing from 9/27/2024 in [facility]   DVT Score 14 filed at 09/27/2024 1313   Medical Factors Present cancer, chemotherapy, or previous malignancy, History of DVT/PE filed at 09/27/2024 1313   Surgical Factors Major surgery planned, lasting over 3 hours filed at 09/27/2024 1313   BMI 31-40 (Obesity) filed at 09/27/2024 1313          Modified Frailty Index      Flowsheet Row Pre-Admission Testing from 9/27/2024 in [facility]   Non-independent functional status (problems with dressing, bathing, personal grooming, or cooking) 0 filed at 09/27/2024 1314   History of diabetes mellitus  0 filed at 09/27/2024 1314   History of COPD 0 filed at 09/27/2024 1314   History of CHF No filed at 09/27/2024 1314   History of MI 0 filed at 09/27/2024 1314   History of Percutaneous Coronary Intervention, Cardiac Surgery, or Angina 0.0909 filed at 09/27/2024 1314   Hypertension requiring the use of medication  0.0909 filed at 09/27/2024 1314   Peripheral vascular disease 0.0909 filed at 09/27/2024 1314   Impaired sensorium (cognitive impairement or loss, clouding, or delirium) 0 filed at 09/27/2024 1314   TIA or CVA withouy residual deficit 0 filed at 09/27/2024 1314   Cerebrovascular accident with deficit 0 filed at 09/27/2024 1314   Modified  Frailty Index Calculator .2727 filed at 09/27/2024 1314          CHADS2 Stroke Risk  Current as of today        2.8% 3 to 100%: High Risk   2 to < 3%: Medium Risk   0 to < 2%: Low Risk     No Change          This score determines the patient's risk of having a stroke if the patient has atrial fibrillation.          Points Metrics   1 Has Congestive Heart Failure:  Yes     Patients with congestive heart failure get 1 point.    Current as of today   0 Has Hypertension:  No     Patients with hypertension get 1 point.    Current as of today   0 Age:  63     Patients who are 75 years of age or older get 1 point.    Current as of today   0 Has Diabetes:  No     Patients with diabetes get 1 point.    Current as of today   0 Had Stroke:  No  Had TIA:  No  Had Thromboembolism:  No     Patients who have had a stroke, TIA, or thromboembolism get 2 points.    Current as of today             Revised Cardiac Risk Index      Flowsheet Row Pre-Admission Testing from 9/27/2024 in [facility] (formerly [facility]3]   High-Risk Surgery (Intraperitoneal, Intrathoracic,Suprainguinal vascular) 1 filed at 09/27/2024 1331   History of ischemic heart disease (History of MI, History of positive exercuse test, Current chest paint considered due to myocardial ischemia, Use of nitrate therapy, ECG with pathological Q Waves) 0 filed at 09/27/2024 1331   History of congestive heart failure (pulmonary edemia, bilateral rales or S3 gallop, Paroxysmal nocturnal dyspnea, CXR showing pulmonary vascular redistribution) 1 filed at 09/27/2024 1331   History of cerebrovascular disease (Prior TIA or stroke) 0 filed at 09/27/2024 1331   Pre-operative insulin treatment 0 filed at 09/27/2024 1331   Pre-operative creatinine>2 mg/dl 0 filed at 09/27/2024 1331   Revised Cardiac Risk Calculator 2 filed at 09/27/2024 1331          Apfel Simplified Score      Flowsheet Row Pre-Admission Testing from 9/27/2024 in [facility] ([facility]3]   Smoking status 1 filed at  09/27/2024 1314   History of motion sickness or PONV  0 filed at 09/27/2024 1314   Use of postoperative opioids 1 filed at 09/27/2024 1314   Gender - Female 0=No filed at 09/27/2024 1314   Apfel Simplified Score Calculator 2 filed at 09/27/2024 1314          Risk Analysis Index Results This Encounter    No data found in the last 10 encounters.       Stop Bang Score      Flowsheet Row Pre-Admission Testing from 9/27/2024 in [facility]   Do you snore loudly? 1 filed at 09/27/2024 1300   Do you often feel tired or fatigued after your sleep? 1 filed at 09/27/2024 1300   Has anyone ever observed you stop breathing in your sleep? 1 filed at 09/27/2024 1300   Do you have or are you being treated for high blood pressure? 1 filed at 09/27/2024 1300   Recent BMI (Calculated) 35.8 filed at 09/27/2024 1300   Is BMI greater than 35 kg/m2? 1=Yes filed at 09/27/2024 1300   Age older than 50 years old? 1=Yes filed at 09/27/2024 1300   Is your neck circumference greater than 17 inches (Male) or 16 inches (Female)? 1 filed at 09/27/2024 1300   Gender - Male 1=Yes filed at 09/27/2024 1300   STOP-BANG Total Score 8 filed at 09/27/2024 1300            Assessment and Plan:   Neuro:  No neurologic diagnoses, however, the patient is at an increased risk for post operative delirium secondary to type and duration of surgery.  Preoperative brain exercise educational handout provided to patient.    The patient is at an increased risk for perioperative stroke secondary to a-fib, cardiac disease, HTN, HLD, general anesthesia, and op time >2.5 hours.    HEENT/Airway:  No diagnosis or significant findings on chart review or clinical presentation and evaluation.    Cardiovascular: Atrial fibrillation status post radiofrequency ablation successfully in October 2022 with Dr. Yeager, managed on carvedilol (continue).      Chronic systolic heart failure/dilated cardiomyopathy, currently euvolemic and managed on furosemide  (continue).    Hypertension managed on losartan (last dose morning day before surgery) and carvedilol (continue).  Hyperlipidemia managed with diet modifications.    Peripheral vascular disease managed with 81 mg daily aspirin (continue).    Patient following with cardiology, Dr. Venancio Brooks and FATUMA Deng, last visit 07/10/2024 at which time patient was stable and to follow-up in 6 months which is scheduled for .  After reaching out via secure chat cardiology noted that patient is stable and optimized and does not require further workup prior to proceeding for surgery.    EKG 2024-Sinus rhythm with first-degree AV block    TTE 23  CONCLUSIONS:   1. Left ventricular systolic function is normal with a 55% estimated ejection fraction.    Cardiac cath 22  CONCLUSIONS:   1. No evidence of coronary artery disease.   2. Left Ventricular end-diastolic pressure = 16.   3. Normal LV filling pressures.    No additional preoperative testing is currently indicated.    METS are 9.9    RCRI  2 which is 10.1% 30 day risk of MACE (risk for cardiac death, nonfatal myocardial infarction, and nonfactal cardiac arrest    ABRAHAM score which indicates a 0.1% risk of intraoperative or 30-day postoperative MACE      Pulmonary:  Asthma that is well-controlled with no recent exacerbations, managed on as needed albuterol rescue inhaler which is used once every 2 to 3 months. CLIVE compliant with CPAP. Preoperative deep breathing educational handout provided to patient.    ARISCAT:   34   points which is a intermediate (13.3%) risk of in-hospital post-op pulmonary complications     PRODIGY:  28  points which is a high risk of post op opioid induced respiratory depression episodes    STOP BAN   points which is a high risk for moderate to severe CLIVE    Renal: BPH and prostate cancer scheduled for surgery    The patient is at increased risk of perioperative renal complications secondary to age>/= 56, male sex,  HTN, and intraperitoneal surgery. Preventative measures include preoperative BP control and hydration.    Endocrine:  No diagnosis or significant findings on chart review or clinical presentation and evaluation.    Hematologic:   provoked DVTs in his early 20s with completion of anticoagulation therapy and no recurrent episodes.  Plan for aggressive DVT prophylaxis.  Preoperative DVT educational handout provided to patient.    Caprini Score:  14  points which is a highest risk of perioperative VTE    Gastrointestinal:   No diagnosis or significant findings on chart review or clinical presentation and evaluation.    EAT-10 score of  0    - self-perceived oropharyngeal dysphagia scale (0-40)     Apfel: 2 points 39% risk for post operative N/V    Infectious disease:  No diagnosis or significant findings on chart review or clinical presentation and evaluation.     Musculoskeletal: Osteoarthritis with as needed pain relievers (hold NSAIDs 7 days).      Labs ordered  Recent Results (from the past 168 hour(s))   CBC    Collection Time: 09/27/24  1:20 PM   Result Value Ref Range    WBC 6.7 4.4 - 11.3 x10*3/uL    nRBC 0.0 0.0 - 0.0 /100 WBCs    RBC 5.25 4.50 - 5.90 x10*6/uL    Hemoglobin 16.8 13.5 - 17.5 g/dL    Hematocrit 49.2 41.0 - 52.0 %    MCV 94 80 - 100 fL    MCH 32.0 26.0 - 34.0 pg    MCHC 34.1 32.0 - 36.0 g/dL    RDW 11.9 11.5 - 14.5 %    Platelets 246 150 - 450 x10*3/uL   Basic Metabolic Panel    Collection Time: 09/27/24  1:20 PM   Result Value Ref Range    Glucose 83 74 - 99 mg/dL    Sodium 138 136 - 145 mmol/L    Potassium 4.4 3.5 - 5.3 mmol/L    Chloride 99 98 - 107 mmol/L    Bicarbonate 28 21 - 32 mmol/L    Anion Gap 15 10 - 20 mmol/L    Urea Nitrogen 32 (H) 6 - 23 mg/dL    Creatinine 1.05 0.50 - 1.30 mg/dL    eGFR 80 >60 mL/min/1.73m*2    Calcium 9.5 8.6 - 10.6 mg/dL   Type And Screen    Collection Time: 09/27/24  1:20 PM   Result Value Ref Range    ABO TYPE A     Rh TYPE POS     ANTIBODY SCREEN NEG     Urinalysis with Reflex Culture and Microscopic    Collection Time: 09/27/24  1:20 PM   Result Value Ref Range    Color, Urine Light-Yellow Light-Yellow, Yellow, Dark-Yellow    Appearance, Urine Clear Clear    Specific Gravity, Urine 1.017 1.005 - 1.035    pH, Urine 6.0 5.0, 5.5, 6.0, 6.5, 7.0, 7.5, 8.0    Protein, Urine NEGATIVE NEGATIVE, 10 (TRACE), 20 (TRACE) mg/dL    Glucose, Urine Normal Normal mg/dL    Blood, Urine NEGATIVE NEGATIVE    Ketones, Urine NEGATIVE NEGATIVE mg/dL    Bilirubin, Urine NEGATIVE NEGATIVE    Urobilinogen, Urine Normal Normal mg/dL    Nitrite, Urine NEGATIVE NEGATIVE    Leukocyte Esterase, Urine NEGATIVE NEGATIVE   Extra Urine Gray Tube    Collection Time: 09/27/24  1:20 PM   Result Value Ref Range    Extra Tube Hold for add-ons.

## 2024-09-28 LAB — HOLD SPECIMEN: NORMAL

## 2024-10-07 ENCOUNTER — APPOINTMENT (OUTPATIENT)
Dept: UROLOGY | Facility: CLINIC | Age: 64
End: 2024-10-07
Payer: COMMERCIAL

## 2024-10-07 DIAGNOSIS — C61 PROSTATE CANCER (MULTI): ICD-10-CM

## 2024-10-07 DIAGNOSIS — R97.20 PSA ELEVATION: Primary | ICD-10-CM

## 2024-10-07 LAB
POC APPEARANCE, URINE: CLEAR
POC BILIRUBIN, URINE: NEGATIVE
POC BLOOD, URINE: NEGATIVE
POC COLOR, URINE: YELLOW
POC GLUCOSE, URINE: NEGATIVE MG/DL
POC KETONES, URINE: NEGATIVE MG/DL
POC LEUKOCYTES, URINE: NEGATIVE
POC NITRITE,URINE: NEGATIVE
POC PH, URINE: 7 PH
POC PROTEIN, URINE: NEGATIVE MG/DL
POC SPECIFIC GRAVITY, URINE: 1.01
POC UROBILINOGEN, URINE: 1 EU/DL

## 2024-10-07 PROCEDURE — 1036F TOBACCO NON-USER: CPT | Performed by: NURSE PRACTITIONER

## 2024-10-07 PROCEDURE — 99213 OFFICE O/P EST LOW 20 MIN: CPT | Performed by: NURSE PRACTITIONER

## 2024-10-07 PROCEDURE — 81003 URINALYSIS AUTO W/O SCOPE: CPT | Performed by: NURSE PRACTITIONER

## 2024-10-07 RX ORDER — TADALAFIL 5 MG/1
5 TABLET ORAL DAILY
Qty: 30 TABLET | Refills: 2 | Status: CANCELLED | OUTPATIENT
Start: 2024-10-07 | End: 2025-01-05

## 2024-10-07 NOTE — PROGRESS NOTES
[facility]  Outpatient Clinic Note    Subjective   Domingo Greer is a 63 y.o. male    History of Present Illness   Patient presenting to clinic today for pre-operative education. Pending RALP + BPLND on 10/14/24 with   Dr. Steele. History of cardiac ablation 2 years ago due to A-fib but has not experienced A-fib since that time. He is not on any anticoagulation medications    MRI of prostate on 12/7/23 demonstrated 0.9cm PI RADS 4 lesions in the left paramedial peripheral zone lesion at the midgland, which demonstrates diffusion restriction and early focal enhancement.  - PSMA PET SCAN on 2/12/24 demonstrated there is no evidence for Ga68 PSMA avid pelvic lymph node  metastasis. There is no evidence for Ga68 PSMA avid distant metastatic    Past Medical History and Surgical History   Past Medical History:   Diagnosis Date    Arrhythmia     s/p RFA Oct 2022 with Dr. Yeager    Asthma ([facility]-H[facility])     BPH (benign prostatic hyperplasia)     Cellulitis, unspecified     Cellulitis    Dilated cardiomyopathy (Multi)     DVT (deep venous thrombosis) (Multi)     multiple, several years ago, s/p DOAC    Hyperlipidemia     Hypertension     Low testosterone in male     Peripheral vascular disease (CMS-HCC)     Prostate cancer (Multi)     Seizure (Multi)     in the setting of fever/infection    Sleep apnea     CPAP    Venous insufficiency of both lower extremities     Venous ulcer (Multi)     right foot     Past Surgical History:   Procedure Laterality Date    ANKLE SURGERY Right 11/13/2013    Ankle Surgery    CARDIAC CATHETERIZATION      CARDIAC ELECTROPHYSIOLOGY MAPPING AND ABLATION      for afib    CHOLECYSTECTOMY  11/13/2013    Cholecystectomy    COLONOSCOPY      OTHER SURGICAL HISTORY  09/23/2022    Vascular surgical procedure    PROSTATE BIOPSY  01/2024    ROTATOR CUFF REPAIR Bilateral 11/13/2013    Rotator Cuff Repair    WISDOM TOOTH EXTRACTION         Medications  Current Outpatient Medications on File Prior to  Visit   Medication Sig Dispense Refill    albuterol 90 mcg/actuation inhaler inhale 1 to 2 puffs by mouth every 4 to 6 hours if needed      aspirin 81 mg EC tablet Take 1 tablet (81 mg) by mouth once daily.      carvedilol (Coreg) 25 mg tablet Take 1 tablet (25 mg) by mouth 2 times daily (morning and late afternoon). 180 tablet 3    furosemide (Lasix) 80 mg tablet Take 1 tablet (80 mg) by mouth once daily. 90 tablet 2    ibuprofen 200 mg tablet Take 4 tablets (800 mg) by mouth once daily.      losartan (Cozaar) 25 mg tablet Take 1 tablet (25 mg) by mouth once daily. 90 tablet 3     No current facility-administered medications on file prior to visit.       Objective   Physicial Exam  General: Well developed, well nourished, alert and cooperative, appears in no acute distress  Eyes: Non-injected conjunctiva, sclera clear, no proptosis  Cardiac: Extremities are warm and well perfused. No edema, cyanosis or pallor.   Lungs: Breathing is easy, non-labored. Speaking in clear and complete sentences. Normal diaphragmatic movement.  MSK: Ambulatory with steady gait, unassisted  Neuro: alert and oriented to person, place and time  Psych: Demonstrates good judgement and reason, without hallucinations, abnormal affect or abnormal behaviors.  Skin: no obvious lesions, no rashes.    No visits with results within 1 Day(s) from this visit.   Latest known visit with results is:   Pre-Admission Testing on 09/27/2024   Component Date Value Ref Range Status    WBC 09/27/2024 6.7  4.4 - 11.3 x10*3/uL Final    nRBC 09/27/2024 0.0  0.0 - 0.0 /100 WBCs Final    RBC 09/27/2024 5.25  4.50 - 5.90 x10*6/uL Final    Hemoglobin 09/27/2024 16.8  13.5 - 17.5 g/dL Final    Hematocrit 09/27/2024 49.2  41.0 - 52.0 % Final    MCV 09/27/2024 94  80 - 100 fL Final    MCH 09/27/2024 32.0  26.0 - 34.0 pg Final    MCHC 09/27/2024 34.1  32.0 - 36.0 g/dL Final    RDW 09/27/2024 11.9  11.5 - 14.5 % Final    Platelets 09/27/2024 246  150 - 450 x10*3/uL Final     Glucose 09/27/2024 83  74 - 99 mg/dL Final    Sodium 09/27/2024 138  136 - 145 mmol/L Final    Potassium 09/27/2024 4.4  3.5 - 5.3 mmol/L Final    Chloride 09/27/2024 99  98 - 107 mmol/L Final    Bicarbonate 09/27/2024 28  21 - 32 mmol/L Final    Anion Gap 09/27/2024 15  10 - 20 mmol/L Final    Urea Nitrogen 09/27/2024 32 (H)  6 - 23 mg/dL Final    Creatinine 09/27/2024 1.05  0.50 - 1.30 mg/dL Final    eGFR 09/27/2024 80  >60 mL/min/1.73m*2 Final    Calculations of estimated GFR are performed using the 2021 CKD-EPI Study Refit equation without the race variable for the IDMS-Traceable creatinine methods.  https://jasn.asnjournals.org/content/early/2021/09/22/ASN.6477062616    Calcium 09/27/2024 9.5  8.6 - 10.6 mg/dL Final    ABO TYPE 09/27/2024 A   Final    Rh TYPE 09/27/2024 POS   Final    ANTIBODY SCREEN 09/27/2024 NEG   Final    Color, Urine 09/27/2024 Light-Yellow  Light-Yellow, Yellow, Dark-Yellow Final    Appearance, Urine 09/27/2024 Clear  Clear Final    Specific Gravity, Urine 09/27/2024 1.017  1.005 - 1.035 Final    pH, Urine 09/27/2024 6.0  5.0, 5.5, 6.0, 6.5, 7.0, 7.5, 8.0 Final    Protein, Urine 09/27/2024 NEGATIVE  NEGATIVE, 10 (TRACE), 20 (TRACE) mg/dL Final    Glucose, Urine 09/27/2024 Normal  Normal mg/dL Final    Blood, Urine 09/27/2024 NEGATIVE  NEGATIVE Final    Ketones, Urine 09/27/2024 NEGATIVE  NEGATIVE mg/dL Final    Bilirubin, Urine 09/27/2024 NEGATIVE  NEGATIVE Final    Urobilinogen, Urine 09/27/2024 Normal  Normal mg/dL Final    Nitrite, Urine 09/27/2024 NEGATIVE  NEGATIVE Final    Leukocyte Esterase, Urine 09/27/2024 NEGATIVE  NEGATIVE Final    Extra Tube 09/27/2024 Hold for add-ons.   Final    Auto resulted.      Review of Systems  All other systems have been reviewed and are negative for complaint.      Assessment and Plan   You are scheduled for prostatectomy (surgical removal of the prostate) with Dr. Steele 10/14/2024    Prior to the day of surgery, you will be contacted by  "Pre-Admission Testing and scheduled for an appointment to meet with the anesthesia team to have laboratory work performed.    You will be contacted by [facility] the night prior to surgery and given a time to arrive at the hospital the day of surgery. Our goal is to discharge you home following surgery.    Day of surgery: You will be taken to the pre-operative area to meet with anesthesia, nursing staff and Dr. Garcia. He will perform the surgery robotically by making 4-5 small incisions on your abdomen. He will then insert the instruments into these small incisions and remove the prostate. At this time, they will also sample lymph nodes. Incisions are glued together with DermaBond (surgical superglue) and you will not have sutures that will need to be removed.    You will then be taken to the post-operative area. When you wake up, you may or may not have have a drain coming from your abdomen. You WILL have a natarajan catheter (flexible, rubber tube) draining urine coming from your penis. The catheter should not be painful, but many men describe it as \"uncomfortable\". This catheter must remain for 1 week to allow healing at the surgical site. You may notice a slight blood-tinge to the urine and this should improve over the next few days.     Upon discharge, you will be given prescriptions for pain medication, stool softener and an antibiotic course to take prior to catheter removal. Your surgeon may or may not ask for imaging or laboratory work to be completed prior to first postoperative visit. You will be scheduled to see a Nurse Practitioner approximately 1 week after discharge for catheter removal. If you are experiencing severe pain, persistent blood in the urine or notice that urine is not draining despite changing positions, you must seek immediate medical attention at [facility] emergency department.    Your catheter will be removed by filling the bladder with sterile instillation, and allowing you to " void. Once the catheter is removed, it is likely you will experience leakage of urine. This is common and expected. This will improve in time. Many men purchase briefs (Depends, Diapers) along with pads/liners. It is helpful to perform kegel exercises to strengthen pelvic floor muscles and help with bladder control. Additionally, it is helpful to have a voiding schedule, every 2-3 hours, to allow the bladder to completely empty. Most men experience a majority of leaking related to stress incontinence: lifting, sneezing, coughing.     We discussed prescription for Tadalafil 5mg (generic Cialis) to take daily, starting on the day of catheter removal. This is to promote blood flow and healing within the pelvis. You may not take this medication if you take nitroglycerin for chest pain. Side effects reviewed.     Erections may be affected by prostate surgery. Prostate surgery will not affect desire or libido. If you are experiencing erectile dysfunction symptoms after surgery, there are multiple therapies to assist. Your Nurse Practitioner or Surgeon will assess your symptoms at your first follow up visit and prescribe an appropriate therapy to fit your needs.    All questions and concerns were addressed. Patient verbalizes understanding and has no other questions at this time.     Venessa Allen-- RAMAKRISHNA BURRIS  Office Phone:  [phone]

## 2024-10-08 DIAGNOSIS — I50.32 HEART FAILURE WITH IMPROVED EJECTION FRACTION (HFIMPEF): ICD-10-CM

## 2024-10-08 RX ORDER — FUROSEMIDE 80 MG/1
80 TABLET ORAL DAILY
Qty: 90 TABLET | Refills: 2 | Status: SHIPPED | OUTPATIENT
Start: 2024-10-08 | End: 2025-07-05

## 2024-10-08 NOTE — TELEPHONE ENCOUNTER
RN called the patient at this time to notify him that a refill is at his pharmacy at this time. Pt verbalized understanding.         ----- Message from Nurse Ana HARTMAN sent at 10/7/2024  4:26 PM EDT -----  Regarding: Refill  Patient is requesting a refill of furosemide 80 mg to be sent to [facility] in [place]. [phone] to reach patient if needed.

## 2024-10-11 ENCOUNTER — ANESTHESIA EVENT (OUTPATIENT)
Dept: OPERATING ROOM | Facility: HOSPITAL | Age: 64
End: 2024-10-11

## 2024-10-14 ENCOUNTER — ANESTHESIA (OUTPATIENT)
Dept: OPERATING ROOM | Facility: HOSPITAL | Age: 64
End: 2024-10-14

## 2024-10-14 ENCOUNTER — HOSPITAL ENCOUNTER (OUTPATIENT)
Facility: HOSPITAL | Age: 64
Setting detail: OBSERVATION
Discharge: HOME | End: 2024-10-15
Attending: UROLOGY | Admitting: UROLOGY
Payer: COMMERCIAL

## 2024-10-14 DIAGNOSIS — C61 MALIGNANT NEOPLASM OF PROSTATE (MULTI): Primary | ICD-10-CM

## 2024-10-14 DIAGNOSIS — C61 PROSTATE CANCER (MULTI): ICD-10-CM

## 2024-10-14 DIAGNOSIS — Z90.79 HISTORY OF PROSTATECTOMY: ICD-10-CM

## 2024-10-14 LAB
ABO GROUP (TYPE) IN BLOOD: NORMAL
ANTIBODY SCREEN: NORMAL
RH FACTOR (ANTIGEN D): NORMAL

## 2024-10-14 PROCEDURE — 3600000017 HC OR TIME - EACH INCREMENTAL 1 MINUTE - PROCEDURE LEVEL SIX: Performed by: UROLOGY

## 2024-10-14 PROCEDURE — 38571 LAPAROSCOPY LYMPHADENECTOMY: CPT | Performed by: UROLOGY

## 2024-10-14 PROCEDURE — 88309 TISSUE EXAM BY PATHOLOGIST: CPT | Performed by: STUDENT IN AN ORGANIZED HEALTH CARE EDUCATION/TRAINING PROGRAM

## 2024-10-14 PROCEDURE — 2500000005 HC RX 250 GENERAL PHARMACY W/O HCPCS: Performed by: UROLOGY

## 2024-10-14 PROCEDURE — 86901 BLOOD TYPING SEROLOGIC RH(D): CPT | Performed by: ANESTHESIOLOGY

## 2024-10-14 PROCEDURE — 96372 THER/PROPH/DIAG INJ SC/IM: CPT | Performed by: UROLOGY

## 2024-10-14 PROCEDURE — 36415 COLL VENOUS BLD VENIPUNCTURE: CPT | Performed by: ANESTHESIOLOGY

## 2024-10-14 PROCEDURE — 2500000001 HC RX 250 WO HCPCS SELF ADMINISTERED DRUGS (ALT 637 FOR MEDICARE OP): Performed by: UROLOGY

## 2024-10-14 PROCEDURE — 55866 LAPS SURG PRST8ECT RPBIC RAD: CPT | Performed by: PHYSICIAN ASSISTANT

## 2024-10-14 PROCEDURE — G0378 HOSPITAL OBSERVATION PER HR: HCPCS

## 2024-10-14 PROCEDURE — 2780000003 HC OR 278 NO HCPCS: Performed by: UROLOGY

## 2024-10-14 PROCEDURE — 38571 LAPAROSCOPY LYMPHADENECTOMY: CPT | Performed by: PHYSICIAN ASSISTANT

## 2024-10-14 PROCEDURE — 3700000002 HC GENERAL ANESTHESIA TIME - EACH INCREMENTAL 1 MINUTE: Performed by: UROLOGY

## 2024-10-14 PROCEDURE — 7100000001 HC RECOVERY ROOM TIME - INITIAL BASE CHARGE: Performed by: UROLOGY

## 2024-10-14 PROCEDURE — 2720000007 HC OR 272 NO HCPCS: Performed by: UROLOGY

## 2024-10-14 PROCEDURE — 3700000001 HC GENERAL ANESTHESIA TIME - INITIAL BASE CHARGE: Performed by: UROLOGY

## 2024-10-14 PROCEDURE — 88307 TISSUE EXAM BY PATHOLOGIST: CPT | Mod: TC,SUR | Performed by: UROLOGY

## 2024-10-14 PROCEDURE — 96361 HYDRATE IV INFUSION ADD-ON: CPT | Mod: 59

## 2024-10-14 PROCEDURE — 3600000018 HC OR TIME - INITIAL BASE CHARGE - PROCEDURE LEVEL SIX: Performed by: UROLOGY

## 2024-10-14 PROCEDURE — 55866 LAPS SURG PRST8ECT RPBIC RAD: CPT | Performed by: UROLOGY

## 2024-10-14 PROCEDURE — 2500000004 HC RX 250 GENERAL PHARMACY W/ HCPCS (ALT 636 FOR OP/ED): Performed by: UROLOGY

## 2024-10-14 PROCEDURE — 96374 THER/PROPH/DIAG INJ IV PUSH: CPT | Mod: 59

## 2024-10-14 PROCEDURE — 2500000004 HC RX 250 GENERAL PHARMACY W/ HCPCS (ALT 636 FOR OP/ED): Performed by: STUDENT IN AN ORGANIZED HEALTH CARE EDUCATION/TRAINING PROGRAM

## 2024-10-14 PROCEDURE — 2500000005 HC RX 250 GENERAL PHARMACY W/O HCPCS

## 2024-10-14 PROCEDURE — 88307 TISSUE EXAM BY PATHOLOGIST: CPT | Performed by: STUDENT IN AN ORGANIZED HEALTH CARE EDUCATION/TRAINING PROGRAM

## 2024-10-14 PROCEDURE — 2500000004 HC RX 250 GENERAL PHARMACY W/ HCPCS (ALT 636 FOR OP/ED)

## 2024-10-14 PROCEDURE — 1170000001 HC PRIVATE ONCOLOGY ROOM DAILY

## 2024-10-14 PROCEDURE — 7100000002 HC RECOVERY ROOM TIME - EACH INCREMENTAL 1 MINUTE: Performed by: UROLOGY

## 2024-10-14 PROCEDURE — 2500000001 HC RX 250 WO HCPCS SELF ADMINISTERED DRUGS (ALT 637 FOR MEDICARE OP): Performed by: STUDENT IN AN ORGANIZED HEALTH CARE EDUCATION/TRAINING PROGRAM

## 2024-10-14 PROCEDURE — 88305 TISSUE EXAM BY PATHOLOGIST: CPT | Performed by: STUDENT IN AN ORGANIZED HEALTH CARE EDUCATION/TRAINING PROGRAM

## 2024-10-14 RX ORDER — KETOROLAC TROMETHAMINE 30 MG/ML
INJECTION, SOLUTION INTRAMUSCULAR; INTRAVENOUS AS NEEDED
Status: DISCONTINUED | OUTPATIENT
Start: 2024-10-14 | End: 2024-10-14

## 2024-10-14 RX ORDER — BUPIVACAINE HYDROCHLORIDE 5 MG/ML
INJECTION, SOLUTION EPIDURAL; INTRACAUDAL AS NEEDED
Status: DISCONTINUED | OUTPATIENT
Start: 2024-10-14 | End: 2024-10-14 | Stop reason: HOSPADM

## 2024-10-14 RX ORDER — GABAPENTIN 300 MG/1
600 CAPSULE ORAL ONCE
Status: COMPLETED | OUTPATIENT
Start: 2024-10-14 | End: 2024-10-14

## 2024-10-14 RX ORDER — LIDOCAINE HYDROCHLORIDE 10 MG/ML
0.1 INJECTION, SOLUTION EPIDURAL; INFILTRATION; INTRACAUDAL; PERINEURAL ONCE
Status: DISCONTINUED | OUTPATIENT
Start: 2024-10-14 | End: 2024-10-14 | Stop reason: HOSPADM

## 2024-10-14 RX ORDER — PHENYLEPHRINE HCL IN 0.9% NACL 0.4MG/10ML
SYRINGE (ML) INTRAVENOUS AS NEEDED
Status: DISCONTINUED | OUTPATIENT
Start: 2024-10-14 | End: 2024-10-14

## 2024-10-14 RX ORDER — CELECOXIB 200 MG/1
400 CAPSULE ORAL ONCE
Status: COMPLETED | OUTPATIENT
Start: 2024-10-14 | End: 2024-10-14

## 2024-10-14 RX ORDER — ONDANSETRON HYDROCHLORIDE 2 MG/ML
4 INJECTION, SOLUTION INTRAVENOUS EVERY 8 HOURS PRN
Status: DISCONTINUED | OUTPATIENT
Start: 2024-10-14 | End: 2024-10-15 | Stop reason: HOSPADM

## 2024-10-14 RX ORDER — PANTOPRAZOLE SODIUM 40 MG/10ML
40 INJECTION, POWDER, LYOPHILIZED, FOR SOLUTION INTRAVENOUS
Status: DISCONTINUED | OUTPATIENT
Start: 2024-10-15 | End: 2024-10-15 | Stop reason: HOSPADM

## 2024-10-14 RX ORDER — OXYCODONE HYDROCHLORIDE 5 MG/1
10 TABLET ORAL EVERY 4 HOURS PRN
Status: DISCONTINUED | OUTPATIENT
Start: 2024-10-14 | End: 2024-10-14 | Stop reason: HOSPADM

## 2024-10-14 RX ORDER — FUROSEMIDE 40 MG/1
80 TABLET ORAL DAILY
Status: DISCONTINUED | OUTPATIENT
Start: 2024-10-14 | End: 2024-10-15 | Stop reason: HOSPADM

## 2024-10-14 RX ORDER — HEPARIN SODIUM 5000 [USP'U]/ML
5000 INJECTION, SOLUTION INTRAVENOUS; SUBCUTANEOUS EVERY 8 HOURS
Status: DISCONTINUED | OUTPATIENT
Start: 2024-10-14 | End: 2024-10-15 | Stop reason: HOSPADM

## 2024-10-14 RX ORDER — CEFAZOLIN 1 G/1
INJECTION, POWDER, FOR SOLUTION INTRAVENOUS AS NEEDED
Status: DISCONTINUED | OUTPATIENT
Start: 2024-10-14 | End: 2024-10-14

## 2024-10-14 RX ORDER — PROPOFOL 10 MG/ML
INJECTION, EMULSION INTRAVENOUS AS NEEDED
Status: DISCONTINUED | OUTPATIENT
Start: 2024-10-14 | End: 2024-10-14

## 2024-10-14 RX ORDER — ALBUTEROL SULFATE 90 UG/1
2 INHALANT RESPIRATORY (INHALATION) EVERY 4 HOURS PRN
Status: DISCONTINUED | OUTPATIENT
Start: 2024-10-14 | End: 2024-10-15 | Stop reason: HOSPADM

## 2024-10-14 RX ORDER — ESOMEPRAZOLE MAGNESIUM 40 MG/1
40 GRANULE, DELAYED RELEASE ORAL
Status: DISCONTINUED | OUTPATIENT
Start: 2024-10-15 | End: 2024-10-15 | Stop reason: HOSPADM

## 2024-10-14 RX ORDER — LOSARTAN POTASSIUM 50 MG/1
25 TABLET ORAL DAILY
Status: DISCONTINUED | OUTPATIENT
Start: 2024-10-14 | End: 2024-10-15 | Stop reason: HOSPADM

## 2024-10-14 RX ORDER — CIPROFLOXACIN 2 MG/ML
400 INJECTION, SOLUTION INTRAVENOUS ONCE
Status: DISCONTINUED | OUTPATIENT
Start: 2024-10-14 | End: 2024-10-14 | Stop reason: HOSPADM

## 2024-10-14 RX ORDER — KETOROLAC TROMETHAMINE 10 MG/1
10 TABLET, FILM COATED ORAL EVERY 6 HOURS PRN
Qty: 20 TABLET | Refills: 0 | Status: SHIPPED | OUTPATIENT
Start: 2024-10-14

## 2024-10-14 RX ORDER — OXYCODONE HYDROCHLORIDE 5 MG/1
5 TABLET ORAL EVERY 4 HOURS PRN
Status: DISCONTINUED | OUTPATIENT
Start: 2024-10-14 | End: 2024-10-14 | Stop reason: HOSPADM

## 2024-10-14 RX ORDER — ROCURONIUM BROMIDE 10 MG/ML
INJECTION, SOLUTION INTRAVENOUS AS NEEDED
Status: DISCONTINUED | OUTPATIENT
Start: 2024-10-14 | End: 2024-10-14

## 2024-10-14 RX ORDER — MIDAZOLAM HYDROCHLORIDE 1 MG/ML
INJECTION INTRAMUSCULAR; INTRAVENOUS AS NEEDED
Status: DISCONTINUED | OUTPATIENT
Start: 2024-10-14 | End: 2024-10-14

## 2024-10-14 RX ORDER — SENNOSIDES 8.6 MG/1
1 TABLET ORAL DAILY
Qty: 10 TABLET | Refills: 0 | Status: SHIPPED | OUTPATIENT
Start: 2024-10-14

## 2024-10-14 RX ORDER — HYDROGEN PEROXIDE 3 %
20 SOLUTION, NON-ORAL MISCELLANEOUS
Qty: 10 CAPSULE | Refills: 0 | Status: SHIPPED | OUTPATIENT
Start: 2024-10-14

## 2024-10-14 RX ORDER — ACETAMINOPHEN 325 MG/1
975 TABLET ORAL EVERY 6 HOURS
Status: DISCONTINUED | OUTPATIENT
Start: 2024-10-14 | End: 2024-10-15 | Stop reason: HOSPADM

## 2024-10-14 RX ORDER — LIDOCAINE 560 MG/1
1 PATCH PERCUTANEOUS; TOPICAL; TRANSDERMAL EVERY 24 HOURS
Status: DISCONTINUED | OUTPATIENT
Start: 2024-10-14 | End: 2024-10-15 | Stop reason: HOSPADM

## 2024-10-14 RX ORDER — ACETAMINOPHEN 500 MG
1000 TABLET ORAL EVERY 6 HOURS PRN
Qty: 30 TABLET | Refills: 0 | Status: SHIPPED | OUTPATIENT
Start: 2024-10-14

## 2024-10-14 RX ORDER — SODIUM CHLORIDE 9 MG/ML
100 INJECTION, SOLUTION INTRAVENOUS CONTINUOUS
Status: DISCONTINUED | OUTPATIENT
Start: 2024-10-14 | End: 2024-10-15

## 2024-10-14 RX ORDER — ACETAMINOPHEN 10 MG/ML
1000 INJECTION, SOLUTION INTRAVENOUS ONCE
Status: COMPLETED | OUTPATIENT
Start: 2024-10-14 | End: 2024-10-14

## 2024-10-14 RX ORDER — ONDANSETRON HYDROCHLORIDE 2 MG/ML
INJECTION, SOLUTION INTRAVENOUS AS NEEDED
Status: DISCONTINUED | OUTPATIENT
Start: 2024-10-14 | End: 2024-10-14

## 2024-10-14 RX ORDER — HEPARIN SODIUM 5000 [USP'U]/ML
5000 INJECTION, SOLUTION INTRAVENOUS; SUBCUTANEOUS ONCE
Status: COMPLETED | OUTPATIENT
Start: 2024-10-14 | End: 2024-10-14

## 2024-10-14 RX ORDER — ALBUTEROL SULFATE 0.83 MG/ML
2.5 SOLUTION RESPIRATORY (INHALATION) ONCE AS NEEDED
Status: DISCONTINUED | OUTPATIENT
Start: 2024-10-14 | End: 2024-10-14 | Stop reason: HOSPADM

## 2024-10-14 RX ORDER — ACETAMINOPHEN 325 MG/1
975 TABLET ORAL ONCE
Status: COMPLETED | OUTPATIENT
Start: 2024-10-14 | End: 2024-10-14

## 2024-10-14 RX ORDER — LIDOCAINE HYDROCHLORIDE 20 MG/ML
INJECTION, SOLUTION INFILTRATION; PERINEURAL AS NEEDED
Status: DISCONTINUED | OUTPATIENT
Start: 2024-10-14 | End: 2024-10-14

## 2024-10-14 RX ORDER — PANTOPRAZOLE SODIUM 40 MG/1
40 TABLET, DELAYED RELEASE ORAL
Status: DISCONTINUED | OUTPATIENT
Start: 2024-10-15 | End: 2024-10-15 | Stop reason: HOSPADM

## 2024-10-14 RX ORDER — FENTANYL CITRATE 50 UG/ML
INJECTION, SOLUTION INTRAMUSCULAR; INTRAVENOUS AS NEEDED
Status: DISCONTINUED | OUTPATIENT
Start: 2024-10-14 | End: 2024-10-14

## 2024-10-14 RX ORDER — SODIUM CHLORIDE 0.9 G/100ML
IRRIGANT IRRIGATION AS NEEDED
Status: DISCONTINUED | OUTPATIENT
Start: 2024-10-14 | End: 2024-10-14 | Stop reason: HOSPADM

## 2024-10-14 RX ORDER — CARVEDILOL 25 MG/1
25 TABLET ORAL
Status: DISCONTINUED | OUTPATIENT
Start: 2024-10-15 | End: 2024-10-15 | Stop reason: HOSPADM

## 2024-10-14 RX ORDER — ONDANSETRON HYDROCHLORIDE 2 MG/ML
4 INJECTION, SOLUTION INTRAVENOUS ONCE AS NEEDED
Status: DISCONTINUED | OUTPATIENT
Start: 2024-10-14 | End: 2024-10-14 | Stop reason: HOSPADM

## 2024-10-14 RX ORDER — ONDANSETRON 4 MG/1
4 TABLET, FILM COATED ORAL EVERY 8 HOURS PRN
Status: DISCONTINUED | OUTPATIENT
Start: 2024-10-14 | End: 2024-10-15 | Stop reason: HOSPADM

## 2024-10-14 RX ORDER — KETOROLAC TROMETHAMINE 15 MG/ML
15 INJECTION, SOLUTION INTRAMUSCULAR; INTRAVENOUS EVERY 8 HOURS
Status: DISCONTINUED | OUTPATIENT
Start: 2024-10-14 | End: 2024-10-15 | Stop reason: HOSPADM

## 2024-10-14 RX ORDER — HYDROMORPHONE HYDROCHLORIDE 1 MG/ML
0.5 INJECTION, SOLUTION INTRAMUSCULAR; INTRAVENOUS; SUBCUTANEOUS EVERY 5 MIN PRN
Status: DISCONTINUED | OUTPATIENT
Start: 2024-10-14 | End: 2024-10-14 | Stop reason: HOSPADM

## 2024-10-14 RX ORDER — LABETALOL HYDROCHLORIDE 5 MG/ML
5 INJECTION, SOLUTION INTRAVENOUS ONCE AS NEEDED
Status: DISCONTINUED | OUTPATIENT
Start: 2024-10-14 | End: 2024-10-14 | Stop reason: HOSPADM

## 2024-10-14 RX ORDER — SIMETHICONE 80 MG
160 TABLET,CHEWABLE ORAL
Status: DISCONTINUED | OUTPATIENT
Start: 2024-10-14 | End: 2024-10-15 | Stop reason: HOSPADM

## 2024-10-14 RX ORDER — SENNOSIDES 8.6 MG/1
2 TABLET ORAL 2 TIMES DAILY
Status: DISCONTINUED | OUTPATIENT
Start: 2024-10-14 | End: 2024-10-15 | Stop reason: HOSPADM

## 2024-10-14 RX ORDER — HYDROMORPHONE HYDROCHLORIDE 1 MG/ML
0.2 INJECTION, SOLUTION INTRAMUSCULAR; INTRAVENOUS; SUBCUTANEOUS EVERY 5 MIN PRN
Status: DISCONTINUED | OUTPATIENT
Start: 2024-10-14 | End: 2024-10-14 | Stop reason: HOSPADM

## 2024-10-14 SDOH — ECONOMIC STABILITY: FOOD INSECURITY: WITHIN THE PAST 12 MONTHS, THE FOOD YOU BOUGHT JUST DIDN'T LAST AND YOU DIDN'T HAVE MONEY TO GET MORE.: NEVER TRUE

## 2024-10-14 SDOH — SOCIAL STABILITY: SOCIAL INSECURITY: WITHIN THE LAST YEAR, HAVE YOU BEEN HUMILIATED OR EMOTIONALLY ABUSED IN OTHER WAYS BY YOUR PARTNER OR EX-PARTNER?: NO

## 2024-10-14 SDOH — SOCIAL STABILITY: SOCIAL INSECURITY: DOES ANYONE TRY TO KEEP YOU FROM HAVING/CONTACTING OTHER FRIENDS OR DOING THINGS OUTSIDE YOUR HOME?: NO

## 2024-10-14 SDOH — ECONOMIC STABILITY: HOUSING INSECURITY: IN THE PAST 12 MONTHS, HOW MANY TIMES HAVE YOU MOVED WHERE YOU WERE LIVING?: 3

## 2024-10-14 SDOH — HEALTH STABILITY: MENTAL HEALTH: HOW OFTEN DO YOU HAVE SIX OR MORE DRINKS ON ONE OCCASION?: NEVER

## 2024-10-14 SDOH — SOCIAL STABILITY: SOCIAL INSECURITY: ARE YOU OR HAVE YOU BEEN THREATENED OR ABUSED PHYSICALLY, EMOTIONALLY, OR SEXUALLY BY ANYONE?: NO

## 2024-10-14 SDOH — SOCIAL STABILITY: SOCIAL INSECURITY: HAVE YOU HAD THOUGHTS OF HARMING ANYONE ELSE?: NO

## 2024-10-14 SDOH — SOCIAL STABILITY: SOCIAL INSECURITY
WITHIN THE LAST YEAR, HAVE YOU BEEN KICKED, HIT, SLAPPED, OR OTHERWISE PHYSICALLY HURT BY YOUR PARTNER OR EX-PARTNER?: NO

## 2024-10-14 SDOH — HEALTH STABILITY: MENTAL HEALTH: HOW OFTEN DO YOU HAVE A DRINK CONTAINING ALCOHOL?: MONTHLY OR LESS

## 2024-10-14 SDOH — ECONOMIC STABILITY: INCOME INSECURITY: IN THE PAST 12 MONTHS HAS THE ELECTRIC, GAS, OIL, OR WATER COMPANY THREATENED TO SHUT OFF SERVICES IN YOUR HOME?: NO

## 2024-10-14 SDOH — HEALTH STABILITY: MENTAL HEALTH: HOW MANY DRINKS CONTAINING ALCOHOL DO YOU HAVE ON A TYPICAL DAY WHEN YOU ARE DRINKING?: 1 OR 2

## 2024-10-14 SDOH — ECONOMIC STABILITY: HOUSING INSECURITY: AT ANY TIME IN THE PAST 12 MONTHS, WERE YOU HOMELESS OR LIVING IN A SHELTER (INCLUDING NOW)?: NO

## 2024-10-14 SDOH — SOCIAL STABILITY: SOCIAL INSECURITY: WITHIN THE LAST YEAR, HAVE YOU BEEN AFRAID OF YOUR PARTNER OR EX-PARTNER?: NO

## 2024-10-14 SDOH — SOCIAL STABILITY: SOCIAL INSECURITY: HAS ANYONE EVER THREATENED TO HURT YOUR FAMILY OR YOUR PETS?: NO

## 2024-10-14 SDOH — SOCIAL STABILITY: SOCIAL INSECURITY: ARE THERE ANY APPARENT SIGNS OF INJURIES/BEHAVIORS THAT COULD BE RELATED TO ABUSE/NEGLECT?: NO

## 2024-10-14 SDOH — ECONOMIC STABILITY: FOOD INSECURITY: WITHIN THE PAST 12 MONTHS, YOU WORRIED THAT YOUR FOOD WOULD RUN OUT BEFORE YOU GOT THE MONEY TO BUY MORE.: NEVER TRUE

## 2024-10-14 SDOH — ECONOMIC STABILITY: TRANSPORTATION INSECURITY: IN THE PAST 12 MONTHS, HAS LACK OF TRANSPORTATION KEPT YOU FROM MEDICAL APPOINTMENTS OR FROM GETTING MEDICATIONS?: NO

## 2024-10-14 SDOH — SOCIAL STABILITY: SOCIAL INSECURITY
WITHIN THE LAST YEAR, HAVE YOU BEEN RAPED OR FORCED TO HAVE ANY KIND OF SEXUAL ACTIVITY BY YOUR PARTNER OR EX-PARTNER?: NO

## 2024-10-14 SDOH — ECONOMIC STABILITY: HOUSING INSECURITY: IN THE LAST 12 MONTHS, WAS THERE A TIME WHEN YOU WERE NOT ABLE TO PAY THE MORTGAGE OR RENT ON TIME?: NO

## 2024-10-14 SDOH — SOCIAL STABILITY: SOCIAL INSECURITY: WERE YOU ABLE TO COMPLETE ALL THE BEHAVIORAL HEALTH SCREENINGS?: YES

## 2024-10-14 SDOH — SOCIAL STABILITY: SOCIAL INSECURITY: DO YOU FEEL UNSAFE GOING BACK TO THE PLACE WHERE YOU ARE LIVING?: NO

## 2024-10-14 SDOH — SOCIAL STABILITY: SOCIAL INSECURITY: DO YOU FEEL ANYONE HAS EXPLOITED OR TAKEN ADVANTAGE OF YOU FINANCIALLY OR OF YOUR PERSONAL PROPERTY?: NO

## 2024-10-14 SDOH — ECONOMIC STABILITY: FOOD INSECURITY: HOW HARD IS IT FOR YOU TO PAY FOR THE VERY BASICS LIKE FOOD, HOUSING, MEDICAL CARE, AND HEATING?: NOT HARD AT ALL

## 2024-10-14 SDOH — HEALTH STABILITY: MENTAL HEALTH: CURRENT SMOKER: 0

## 2024-10-14 SDOH — SOCIAL STABILITY: SOCIAL INSECURITY: HAVE YOU HAD ANY THOUGHTS OF HARMING ANYONE ELSE?: NO

## 2024-10-14 SDOH — SOCIAL STABILITY: SOCIAL INSECURITY: ABUSE: ADULT

## 2024-10-14 ASSESSMENT — PAIN DESCRIPTION - LOCATION: LOCATION: ABDOMEN

## 2024-10-14 ASSESSMENT — ACTIVITIES OF DAILY LIVING (ADL)
ADEQUATE_TO_COMPLETE_ADL: YES
LACK_OF_TRANSPORTATION: NO
HEARING - LEFT EAR: FUNCTIONAL
WALKS IN HOME: INDEPENDENT
HEARING - RIGHT EAR: FUNCTIONAL
JUDGMENT_ADEQUATE_SAFELY_COMPLETE_DAILY_ACTIVITIES: YES
BATHING: INDEPENDENT
DRESSING YOURSELF: INDEPENDENT
FEEDING YOURSELF: INDEPENDENT
LACK_OF_TRANSPORTATION: NO
TOILETING: INDEPENDENT
GROOMING: INDEPENDENT
PATIENT'S MEMORY ADEQUATE TO SAFELY COMPLETE DAILY ACTIVITIES?: YES

## 2024-10-14 ASSESSMENT — PAIN SCALES - GENERAL
PAINLEVEL_OUTOF10: 1
PAINLEVEL_OUTOF10: 3
PAINLEVEL_OUTOF10: 3
PAIN_LEVEL: 2
PAINLEVEL_OUTOF10: 1

## 2024-10-14 ASSESSMENT — PATIENT HEALTH QUESTIONNAIRE - PHQ9
SUM OF ALL RESPONSES TO PHQ9 QUESTIONS 1 & 2: 0
1. LITTLE INTEREST OR PLEASURE IN DOING THINGS: NOT AT ALL
2. FEELING DOWN, DEPRESSED OR HOPELESS: NOT AT ALL

## 2024-10-14 ASSESSMENT — LIFESTYLE VARIABLES
HOW MANY STANDARD DRINKS CONTAINING ALCOHOL DO YOU HAVE ON A TYPICAL DAY: PATIENT DOES NOT DRINK
AUDIT-C TOTAL SCORE: 0
SKIP TO QUESTIONS 9-10: 1
PRESCIPTION_ABUSE_PAST_12_MONTHS: NO
AUDIT-C TOTAL SCORE: 0
SKIP TO QUESTIONS 9-10: 1
HOW OFTEN DO YOU HAVE 6 OR MORE DRINKS ON ONE OCCASION: NEVER
SUBSTANCE_ABUSE_PAST_12_MONTHS: NO
AUDIT-C TOTAL SCORE: 1
HOW OFTEN DO YOU HAVE A DRINK CONTAINING ALCOHOL: NEVER

## 2024-10-14 ASSESSMENT — COGNITIVE AND FUNCTIONAL STATUS - GENERAL: PATIENT BASELINE BEDBOUND: YES

## 2024-10-14 ASSESSMENT — PAIN - FUNCTIONAL ASSESSMENT
PAIN_FUNCTIONAL_ASSESSMENT: 0-10

## 2024-10-14 ASSESSMENT — COLUMBIA-SUICIDE SEVERITY RATING SCALE - C-SSRS
2. HAVE YOU ACTUALLY HAD ANY THOUGHTS OF KILLING YOURSELF?: NO
6. HAVE YOU EVER DONE ANYTHING, STARTED TO DO ANYTHING, OR PREPARED TO DO ANYTHING TO END YOUR LIFE?: NO
1. IN THE PAST MONTH, HAVE YOU WISHED YOU WERE DEAD OR WISHED YOU COULD GO TO SLEEP AND NOT WAKE UP?: NO

## 2024-10-14 NOTE — ANESTHESIA PROCEDURE NOTES
Airway  Date/Time: 10/14/2024 12:35 PM  Urgency: elective    Airway not difficult    Staffing  Performed: resident   Authorized by: Sonam Ramey MD    Performed by: Pedro Villalpando MD  Patient location during procedure: OR    Indications and Patient Condition  Indications for airway management: anesthesia  Spontaneous Ventilation: absent  Sedation level: deep  Preoxygenated: yes  Patient position: sniffing  Mask difficulty assessment: 3 - difficult mask (inadequate, unstable or two providers) +/- NMBA  Planned trial extubation    Final Airway Details  Final airway type: endotracheal airway      Successful airway: ETT     Successful intubation technique: direct laryngoscopy  Facilitating devices/methods: intubating stylet  Endotracheal tube insertion site: oral  Blade: Pita  Blade size: #4  ETT size (mm): 7.5  Cormack-Lehane Classification: grade I - full view of glottis  Placement verified by: capnometry   Measured from: lips  ETT to lips (cm): 22  Number of attempts at approach: 2

## 2024-10-14 NOTE — ANESTHESIA PREPROCEDURE EVALUATION
Patient: Domingo Greer    Procedure Information       Date/Time: 10/14/24 1220    Procedures:       Resection Robot-Assisted Prostate (Pelvis)      Lymphadenectomy Laparoscopy Robot-Assisted (Bilateral)    Location: [facility] OR 02 / Virtual [facility] OR    Surgeons: Maik Steele MD            Relevant Problems   Cardiac   (+) Abnormal EKG   (+) Atrial fibrillation (Multi)      Pulmonary   (+) Asthma exacerbation (HHS-HCC)   (+) Dyspnea on exertion   (+) Exacerbation of asthma (HHS-HCC)      Neuro   (+) Bilateral carpal tunnel syndrome   (+) Carpal tunnel syndrome      GI (within normal limits)      /Renal   (+) Malignant neoplasm of prostate (Multi)   (+) Prostate cancer (Multi)      Liver   (+) Elevated liver function tests      Endocrine (within normal limits)      Hematology (within normal limits)      Musculoskeletal   (+) Bilateral carpal tunnel syndrome   (+) Carpal tunnel syndrome       Clinical information reviewed:   Tobacco  Allergies  Meds   Med Hx  Surg Hx   Fam Hx          NPO Detail:  NPO/Void Status  Date of Last Liquid: 10/14/24  Time of Last Liquid: 0000  Date of Last Solid: 10/14/24  Time of Last Solid: 0000         Physical Exam    Airway  Mallampati: II  TM distance: >3 FB  Neck ROM: limited  Comments: Can only reach part way to bite upper lip; decreased neck motion   Cardiovascular   Rhythm: regular  Rate: normal     Dental - normal exam     Pulmonary - normal exam     Abdominal          Anesthesia Plan    History of general anesthesia?: yes  History of complications of general anesthesia?: no    ASA 3     general     The patient is not a current smoker.    intravenous and inhalational induction   Postoperative administration of opioids is intended.  Trial extubation is planned.  Anesthetic plan and risks discussed with patient.  Use of blood products discussed with patient who consented to blood products.    Plan discussed with resident and attending.

## 2024-10-14 NOTE — ANESTHESIA POSTPROCEDURE EVALUATION
Patient: Domingo Greer    Procedure Summary       Date: 10/14/24 Room / Location: [facility] OR 02 / Virtual [facility]    Anesthesia Start: 1215 Anesthesia Stop: 1729    Procedures:       Resection Robot-Assisted Prostate (Pelvis)      Lymphadenectomy Laparoscopy Robot-Assisted (Bilateral) Diagnosis:       Prostate cancer (Multi)      (Prostate cancer (Multi) [C61])    Surgeons: Maik Steele MD Responsible Provider: Rogelio Neves MD    Anesthesia Type: general ASA Status: 3            Anesthesia Type: general    Vitals Value Taken Time   /90 10/14/24 1724   Temp 36.2 10/14/24 1729   Pulse 70 10/14/24 1726   Resp 18 10/14/24 1726   SpO2 99 % 10/14/24 1726   Vitals shown include unfiled device data.    Anesthesia Post Evaluation    Patient location during evaluation: PACU  Patient participation: complete - patient participated  Level of consciousness: awake  Pain score: 2  Pain management: adequate  Multimodal analgesia pain management approach  Airway patency: patent  Two or more strategies used to mitigate risk of obstructive sleep apnea  Cardiovascular status: acceptable, blood pressure returned to baseline and hemodynamically stable  Respiratory status: acceptable  Hydration status: acceptable  Postoperative Nausea and Vomiting: none        No notable events documented.

## 2024-10-14 NOTE — ANESTHESIA PROCEDURE NOTES
Peripheral IV  Date/Time: 10/14/2024 12:40 PM      Placement  Needle size: 18 G  Laterality: right  Location: hand  Site prep: alcohol  Technique: anatomical landmarks  Attempts: 1

## 2024-10-14 NOTE — OP NOTE
Resection Robot-Assisted Prostate, Lymphadenectomy Laparoscopy Robot-Assisted (B) Operative Note     Date: 10/14/2024  OR Location: [facility] OR    Name: Domingo Greer, : 1960, Age: 63 y.o., MRN: [id number], Sex: male    Diagnosis  Pre-op Diagnosis      * Prostate cancer (Multi) [C61] Post-op Diagnosis     * Prostate cancer (Multi) [C61]     Procedures  Resection Robot-Assisted Prostate  82277 - NE LAPS SURG [id number] SMPL STOT ROBOTIC ASSISTANCE    Lymphadenectomy Laparoscopy Robot-Assisted  78741 - NE LAPS BI TOT PEL LMPHADEC & PETROS-AORTIC LYMPH BX 1      Surgeons      * Maik Steele - Primary    Resident/Fellow/Other Assistant:  Surgeons and Role:     * Jerod Mendoza MD - Resident - Assisting     * RONAL Whitehead-C - MING First Assist    Procedure Summary  Anesthesia: General  ASA: III  Anesthesia Staff: Anesthesiologist: Sonam Ramey MD; Rogelio Neves MD; Kaila Mosqueda MD  C-AA: ULISES Alaniz  Anesthesia Resident: Pedro Villalpando MD  Estimated Blood Loss: 100mL  Intra-op Medications: Administrations occurring from 1220 to 1605 on 10/14/24:  * No intraprocedure medications in log *           Anesthesia Record               Intraprocedure I/O Totals       None           Specimen:   ID Type Source Tests Collected by Time   1 : anterior prostatic fat Tissue ADIPOSE TISSUE SURGICAL PATHOLOGY EXAM Maik Steele MD 10/14/2024 1410   2 : prostate and seminal vesicles Tissue PROSTATE RADICAL PROSTATECTOMY SURGICAL PATHOLOGY EXAM Maik Steele MD 10/14/2024 1524   3 : right pelvic lymph node Tissue PELVIC LYMPH NODE DISSECTION RIGHT SURGICAL PATHOLOGY EXAM Maik Steele MD 10/14/2024 1534   4 : LEFT PELVIC LYMPH NODE Tissue PELVIC LYMPH NODE DISSECTION LEFT SURGICAL PATHOLOGY EXAM Maik Steele MD 10/14/2024 1536        Staff:   Dasha: Lenny  Circulator: Ophelia Horner Person: Sierra  Circulator: [place]  Relief Scrub: Dale  Relief Circulator: Flavia         Drains and/or Catheters:    Closed/Suction Drain 1 Right;Anterior Abdomen Bulb 10 Fr. (Active)       Urethral Catheter Non-latex 16 Fr. (Active)       Tourniquet Times:         Implants:     Findings: Difficult prostatic dissection planes, no nerve sparing performed. Bilateral pelvic lymph nodes appeared somewhat enlarged and with potential inflammatory reaction. Negative leak test. AISHA drain placed (terminates in pelvis).    Indications: Domingo Greer is an 63 y.o. male who is having surgery for Prostate cancer (Multi) [C61].     The patient was seen in the preoperative area. The risks, benefits, complications, treatment options, non-operative alternatives, expected recovery and outcomes were discussed with the patient. The possibilities of reaction to medication, pulmonary aspiration, injury to surrounding structures, bleeding, recurrent infection, the need for additional procedures, failure to diagnose a condition, and creating a complication requiring transfusion or operation were discussed with the patient. The patient concurred with the proposed plan, giving informed consent.  The site of surgery was properly noted/marked if necessary per policy. The patient has been actively warmed in preoperative area. Preoperative antibiotics have been ordered and given within 1 hours of incision. Venous thrombosis prophylaxis have been ordered including bilateral sequential compression devices and chemical prophylaxis    Procedure Details:   The patient was taken to the operating room and placed in supine position.       General anesthesia was induced smoothly.  The patient was shaved, prepped, and draped in usual sterile fashion, secured to the bed with Pink Pad as well as chest strap.  A transverse incision was made above the umbilicus with a knife, and intraperitoneal access was obtained using a Veress needle using the drop test to confirm placement.  The abdomen was insufflated with CO2 to a pressure of 20 which was later dropped to 15  following port placement. Due to low tidal volumes, the insufflation was further dropped to 12mmHg which improved his respiration.  We placed an 8 robot port in the midline here and inspection of his abdomen which had no abnormalities. Two robot ports were placed on the right side of the abdomen, additional right robot port placed in the left with a 12 mm AirSeal port placed as an assistant port on the left side.  The patient was placed in steep Trendelenburg position, and the robot was docked.      The patient was noted to have a relatively narrow pelvis and his bowel/intraabdominal fat were persistently encroaching on the area of dissection. Retraction was able to be adequately achieved with the help of the assistant but the posterior dissection was difficult.    We first performed the posterior dissection by incising the peritoneum behind the prostate.  The bilateral vasa were identified, dissected out and divided with cautery.  The seminal vesicles were then dissected out in their entirety using sharp dissection.  We entered Denonvilliers fascia sharply and developed the space between the rectum and the prostate bluntly toward the apex of the prostate. We then placed a 4 x 18 in the space which was removed at the end of the procedure.      The bladder was then dropped in the usual fashion. The endopelvic fascia was incised sharply bilaterally but the anterior and lateral planes around the prostate were sticky and difficult to dissect. The pelvic floor musculature was swept away, and a bunching stitch was placed overlying the prostate to better delineate the bladder neck using a 2-0 V-Loc suture.      The bladder neck was then divided with cautery on the anterior portion, and then the posterior bladder neck was entered, was divided until the posterior plane previously dissected was identified. Of, note there was a median lobe of the prostate which stayed with the prostate. The vas and seminal vesicles were then  pulled anteriorly. Due to the difficult planes and diffuse venous oozing, nerve-sparing was not performed.   The pedicles were taken with the vessel sealer.     The dorsal venous complex was then suture ligated with a 2-0 V-Loc suture.  The urethra was divided with the scissors and the apex of the prostate dissected free with the prostate specimen then placed in a specimen bag.     The lymph node dissection was then performed at this point with all fatty lymphatic tissue taken that was medial to the external iliac vein down to the obturator nerve which was skeletonized and spared.  The bilateral packets were noted to be slightly bulkier than usual with stickier planes. During the left lymphadenectomy, a branch of the external iliac vein was noted to be oozing. This was oversewn with a 3-0 v-lock and hemostasis was excellent.    The vesicourethral anastomosis was then performed with 2 running 3-0 V-Loc sutures. A 18-Azeri Madrigal catheter was placed after the anastomosis was completed and filled with 15 mL of sterile water.  The bladder was irrigated with saline, and the anastomosis appeared watertight.  Given the general ooziness throughout the case, a drain was placed through the right-most robotic port and placed terminating in the pelvis. The robot was then undocked and the ports removed. The drain was secured with a silk drain stitch.    The specimen was removed through a midline incision which was extended transversely with the Bovie. The fascia at this site was then closed with interrupted 0 Vicryl sutures. The assistant port site was closed with 0 Vicryl, Bryce-King needle that was placed before laparoscopy was completed.  The midline incision was closed with a deep 3-0 vicryl followed by 4-0 monocryl for the skin. The skin incisions were then closed with running subcuticular 4-0 Monocryl, and Dermabond was applied.  The patient was then awoken from anesthesia and taken to PACU in good  condition.    Complications:  None; patient tolerated the procedure well.    Disposition: PACU - hemodynamically stable.  Condition: stable         Additional Details:   - To be admitted overnight  - Madrigal to stay through outpatient follow-up  - Will discuss final pathology at outpatient follow-up    I certify that the services provided by the physician assistant including prepping the patient, docking the robot, exchanging robotic arms, providing retraction, and closure of the operative site for which payment is claimed were medically necessary. No qualified resident assist was available as listed residents are precluded from furnishing above services due to involvement in educational activities during the case.      Attending Attestation:     Maik Steele  Phone Number: [phone]

## 2024-10-14 NOTE — PERIOPERATIVE NURSING NOTE
1721 Pt arrived to PACU, alert and able to answer questions and follow commands. Pt shows no signs of distress. Will continue to monitor.     1808 Report called to nurse on micha 5. Pt stable.     1821 Transport requested for pt.     1826 pt in route to room on micha 5. Pt stable.             Teresita Monroy RN

## 2024-10-14 NOTE — DISCHARGE INSTRUCTIONS
DEPARTMENT OF Urology  DISCHARGE INSTRUCTIONS Prostatectomy  Inpatient Surgery    C O N F I D E N T I A L   I N F O R M A T I O N    Domingo Bonnerar      Call [phone] during regular daytime business hours (8:00 am - 5:00 pm) and after 5:00 pm and ask for the Urology resident with any questions or concerns.    If it is a life-threatening situation, proceed to the nearest emergency department.        Follow-up appointment:  10/21/24      Thank you for the opportunity to care for you today.  Your health and healing are very important to us.  We hope we made you feel as comfortable as possible and are committed to your recovery and continued well-being.      The following is a brief overview of your prostatectomy procedure. Some of the information contained on this summary may be confidential.  This information should be kept in your records and should be shared with your regular doctor.    Physicians:   Dr. Steele    Procedure performed: removal of your prostate  Pending Results: final surgical pathology    What to Expect During your Recovery and Home Care  Anesthesia Side Effects   You may feel sleepy, tired, or have a sore throat.   You may also feel drowsiness, dizziness, or inability to think clearly.  For your safety, do not drive, drink alcoholic beverages, take any unprescribed medication or make any important decisions for 24 hours after anesthesia.  A responsible adult should be with you for 24 hours.        Activity and Recovery    No heavy lifting greater than 10 pounds for the next 4-6 weeks. No driving until mobility has returned to normal. Do not drive or operate heavy machinery while taking narcotic pain medications as these medications can alter perception, impair judgement, and slow reaction times.  Pain Control  Unfortunately, you may experience pain after your procedure. Adequate pain management can include alternative measures to help ease your pain and that can include over the counter Tylenol  and your prescribed Toradol which can be taken as prescribed as needed for breakthrough pain. Do not take more than 4,000mg of Tylenol in a 24-hour period.    Do not take ibuprofen if you are taking Toradol. Taking them at the same time can damage the kidneys. It is safe to take Tylenol and Toradol at the same time.    Your procedure was done robotically so you may experience gas pains from the surgery. Getting up and moving around is the best way to relieve those pains. You can also take some over the counter gas-x(simethicone).    Nausea/Vomiting   Clear liquids are best tolerated at first. Start slow, advance your diet as tolerated to normal foods. Avoid spicy, greasy, heavy foods at first. Also, you may feel nauseous or like you need to vomit if you take any type of medication on an empty stomach.  Call your physician if you are unable to eat or drink, are not passing gas and have persistent vomiting.    Signs of Bleeding   You could have some blood in your urine off and on over the next several weeks. Your urine will be light pink to yellow. Minor bleeding or drainage may occur from the surgical sites; however, excessive or consistent bleeding should be reported to your surgeon. Excessive bleeding is defined as blood that is dripping from wound, soaking you bandages, and is ketchup colored, thick with possible blood clots.  Consistent is defined as bleeding that does not stop.      Treatment/wound care:   Keep area(s) clean and dry.   It is okay to shower 24 hours after time of surgery.    Do not scrub wound(s), pat dry.    Do not submerge wound(s) in standing water until seen for follow up appointment (no tub bathing, swimming, or hot tubs).    Clean with mild soap, gentle washing, pat dry, cover with bandage as needed.    Avoid waterproof bandages.  No oils or lotions on incisions.  Staples/sutures removed in our [id number] days after the date of surgery.  Do not remove the staples/sutures on your  own.    Please visually inspect your wound(s) at least once daily.  If the wound(s) are in a difficult to see location, please use a mirror or have someone else assist with visual inspection.    Signs of Infection  Signs of infection can include fever, chills, redness around surgical incisions, green/yellow drainage from incisions, or severe abdominal pain.  If you see any of these occur, please contact your doctor's office at [phone].  Any fever higher than 100.4, especially if associated with an ill feeling, abdominal pain, chills, or nausea should be reported to your surgeon.      Assist in bowel movements/urination  Take daily stool softener you were prescribed  Increase fiber in diet  Increase water (6 to 8 glasses)  Increase walking   Can try adding over the counter MiraLAX or in extreme cases milk of magnesia.  If you have tried these methods and you are not passing gas, your bladder still feels full and you cannot have a bowel movement, please go to your nearest Emergency room/contact your physician.    Additional Instructions:   Use a small pillow to put pressure on your belly. This can make you more comfortable when you cough, laugh or do other actions.     CATHETER CARE  Always keep the catheters tubing and drainage bag below the level of your bladder.  Avoid loops and kinks in the catheter tubing.  NOTIFY your physician if catheter falls out or catheter seems clogged and urine is not draining.   Do not wear the small leg bag to bed you should be provided with a larger overnight bag that you should wear to bed and can hang over the side of the bed.  We recommend wearing the large bag in the shower as well as this is easy to dry, and you do not get your leg straps wet from your leg bag.   Your catheter should be secured to your upper thigh, do not allow it to hang or dangle.    Future Appointments   Date Time Provider Department Center   10/21/2024  8:40 AM FATUMA Garcia-RAMAKRISHNA [address]    11/21/2024  2:00 PM Maik Steele MD [facility]   1/6/2025  1:45 PM Venancio Brooks MD [id number]

## 2024-10-15 VITALS
SYSTOLIC BLOOD PRESSURE: 144 MMHG | HEART RATE: 72 BPM | OXYGEN SATURATION: 94 % | TEMPERATURE: 97 F | WEIGHT: 265 LBS | RESPIRATION RATE: 17 BRPM | DIASTOLIC BLOOD PRESSURE: 84 MMHG | BODY MASS INDEX: 35.94 KG/M2

## 2024-10-15 LAB
ANION GAP SERPL CALC-SCNC: 12 MMOL/L (ref 10–20)
BUN SERPL-MCNC: 28 MG/DL (ref 6–23)
CALCIUM SERPL-MCNC: 8.6 MG/DL (ref 8.6–10.6)
CHLORIDE SERPL-SCNC: 100 MMOL/L (ref 98–107)
CO2 SERPL-SCNC: 27 MMOL/L (ref 21–32)
CREAT FLD-MCNC: 1.19 MG/DL
CREAT SERPL-MCNC: 1 MG/DL (ref 0.5–1.3)
EGFRCR SERPLBLD CKD-EPI 2021: 85 ML/MIN/1.73M*2
ERYTHROCYTE [DISTWIDTH] IN BLOOD BY AUTOMATED COUNT: 11.8 % (ref 11.5–14.5)
GLUCOSE SERPL-MCNC: 140 MG/DL (ref 74–99)
HCT VFR BLD AUTO: 43.1 % (ref 41–52)
HGB BLD-MCNC: 14.6 G/DL (ref 13.5–17.5)
MCH RBC QN AUTO: 31.9 PG (ref 26–34)
MCHC RBC AUTO-ENTMCNC: 33.9 G/DL (ref 32–36)
MCV RBC AUTO: 94 FL (ref 80–100)
NRBC BLD-RTO: 0 /100 WBCS (ref 0–0)
PLATELET # BLD AUTO: 228 X10*3/UL (ref 150–450)
POTASSIUM SERPL-SCNC: 4.4 MMOL/L (ref 3.5–5.3)
RBC # BLD AUTO: 4.58 X10*6/UL (ref 4.5–5.9)
SODIUM SERPL-SCNC: 135 MMOL/L (ref 136–145)
WBC # BLD AUTO: 7.5 X10*3/UL (ref 4.4–11.3)

## 2024-10-15 PROCEDURE — 80048 BASIC METABOLIC PNL TOTAL CA: CPT | Performed by: STUDENT IN AN ORGANIZED HEALTH CARE EDUCATION/TRAINING PROGRAM

## 2024-10-15 PROCEDURE — 2500000004 HC RX 250 GENERAL PHARMACY W/ HCPCS (ALT 636 FOR OP/ED): Performed by: STUDENT IN AN ORGANIZED HEALTH CARE EDUCATION/TRAINING PROGRAM

## 2024-10-15 PROCEDURE — 36415 COLL VENOUS BLD VENIPUNCTURE: CPT | Performed by: STUDENT IN AN ORGANIZED HEALTH CARE EDUCATION/TRAINING PROGRAM

## 2024-10-15 PROCEDURE — 82570 ASSAY OF URINE CREATININE: CPT

## 2024-10-15 PROCEDURE — G0378 HOSPITAL OBSERVATION PER HR: HCPCS

## 2024-10-15 PROCEDURE — 96376 TX/PRO/DX INJ SAME DRUG ADON: CPT

## 2024-10-15 PROCEDURE — 85027 COMPLETE CBC AUTOMATED: CPT | Performed by: STUDENT IN AN ORGANIZED HEALTH CARE EDUCATION/TRAINING PROGRAM

## 2024-10-15 PROCEDURE — 2500000001 HC RX 250 WO HCPCS SELF ADMINISTERED DRUGS (ALT 637 FOR MEDICARE OP): Performed by: STUDENT IN AN ORGANIZED HEALTH CARE EDUCATION/TRAINING PROGRAM

## 2024-10-15 PROCEDURE — 2500000001 HC RX 250 WO HCPCS SELF ADMINISTERED DRUGS (ALT 637 FOR MEDICARE OP)

## 2024-10-15 RX ORDER — NAPROXEN SODIUM 220 MG/1
81 TABLET, FILM COATED ORAL DAILY
Status: DISCONTINUED | OUTPATIENT
Start: 2024-10-15 | End: 2024-10-15 | Stop reason: HOSPADM

## 2024-10-15 ASSESSMENT — COGNITIVE AND FUNCTIONAL STATUS - GENERAL
STANDING UP FROM CHAIR USING ARMS: A LITTLE
DRESSING REGULAR UPPER BODY CLOTHING: A LITTLE
PERSONAL GROOMING: A LITTLE
TOILETING: A LITTLE
MOVING TO AND FROM BED TO CHAIR: A LITTLE
HELP NEEDED FOR BATHING: A LITTLE
WALKING IN HOSPITAL ROOM: A LITTLE
DRESSING REGULAR LOWER BODY CLOTHING: A LITTLE
TURNING FROM BACK TO SIDE WHILE IN FLAT BAD: A LITTLE

## 2024-10-15 ASSESSMENT — PAIN SCALES - GENERAL: PAINLEVEL_OUTOF10: 1

## 2024-10-15 NOTE — CARE PLAN
The patient's goals for the shift include pt will be free from nausea    The clinical goals for the shift include pt will remain stable    Problem: Pain  Goal: Free from acute confusion related to pain meds throughout the shift  Outcome: Progressing     Problem: Pain  Goal: Participates in PT with improved pain control throughout the shift  Outcome: Progressing     Problem: Pain  Goal: Walks with improved pain control throughout the shift  Outcome: Progressing     Problem: Pain  Goal: Turns in bed with improved pain control throughout the shift  Outcome: Progressing

## 2024-10-15 NOTE — DISCHARGE SUMMARY
Discharge Diagnosis  Prostate cancer (Multi)    Issues Requiring Follow-Up  TOV 10/21, POV 11/21    Test Results Pending At Discharge  Visit Vitals  /79 (BP Location: Right arm, Patient Position: Lying)   Pulse 69   Temp 35.5 °C (95.9 °F) (Temporal)   Resp 17           Hospital Course  Domingo Greer is a 63 year old male with a significant pmh of HTN, HLD, afib s/p ablation 10/2022 (without recurrence, not on anticoagulation), remote provoked DVT, well controlled asthma, CLIVE (on CPAP), low testosterone (on HRT for 13 years), BPH, and elevated PSA (4.83 11/2023) with 12/7/23 MRI showing PIRADS4 lesion of the left mid PZ without MICK, SVI or lymphadenopathy. 2/12 PET CT showed no evidence of pelvic lymph node or distant metastasis. 1/5/24 biopsy (Dr. Gordy Gaspar) revealed GG3 disease of the SHANICE, GG2 of the left apex and b/l lateral mid area regions, and GG1 elsewhere. He was recommended for definitive treatment but was considering his treatment options until PSA jenny to 7.82 6/3/24. 58.6g prostate, PSA density 0.08 ng/mL/g. JEB 19, IPSS 3. He is now s/p RALP with BPLND with Dr. Steele on 10/14. The procedure was well tolerated, and findings were difficult prostatic dissection planes thus no nerve sparing performed, bilateral pelvic lymph nodes appeared somewhat enlarged and with potential inflammatory reaction. The patient is currently POD1 and has had uncomplicated surgical course. VS remained stable. Post-op labs were significant for normal hgb and sCr. AISHA output was low and creatinine was wnl, thus drain was removed. Patient was making good amounts of clear yellow urine per natarajan which will remain until outpatient TOV. At the time of hospital discharge He was tolerating a regular diet, ambulating independently, passing flatus, and pain was well-controlled. The patient worked with the nursing team to attain a baseline proficiency in new natarajan care. He was discharged to home in satisfactory condition and  will follow up for TOV on 10/21 and with Dr. Maik Steele MD in the outpatient clinic on 11/21.    Pertinent Physical Exam At Time of Discharge  Constitutional: Alert, NAD  HEENT: normocephalic, atraumatic EOMI  Neuro: A&O x3  CV: rate is regular  Pulm: Non-laboured breathing  GI: abdomen soft, non-tender, non-distended  : Madrigal in place draining clear yellow urine  Skin: Port incisions with dermabond c/d/i. No lesions or discoloration noted.  Musculoskeletal LORI  Extremities: no edema or cyanosis noted    Home Medications     Medication List      START taking these medications     acetaminophen 500 mg tablet; Commonly known as: Tylenol; Take 2 tablets   (1,000 mg) by mouth every 6 hours if needed for mild pain (1 - 3).   esomeprazole 20 mg DR capsule; Commonly known as: NexIUM; Take 1 capsule   (20 mg) by mouth once daily in the morning. Take before meals. Do not open   capsule.   ketorolac 10 mg tablet; Commonly known as: Toradol; Take 1 tablet (10   mg) by mouth every 6 hours if needed for moderate pain (4 - 6).   sennosides 8.6 mg tablet; Commonly known as: Senokot; Take 1 tablet (8.6   mg) by mouth once daily.     CONTINUE taking these medications     albuterol 90 mcg/actuation inhaler   aspirin 81 mg EC tablet   carvedilol 25 mg tablet; Commonly known as: Coreg; Take 1 tablet (25 mg)   by mouth 2 times daily (morning and late afternoon).   furosemide 80 mg tablet; Commonly known as: Lasix; Take 1 tablet (80 mg)   by mouth once daily.   losartan 25 mg tablet; Commonly known as: Cozaar; Take 1 tablet (25 mg)   by mouth once daily.     STOP taking these medications     ibuprofen 200 mg tablet       Outpatient Follow-Up  Future Appointments   Date Time Provider Department Center   10/21/2024  8:40 AM FATUMA [id number]   11/21/2024  2:00 PM Maik Steele MD [facility]   1/6/2025  1:45 PM Venancio Brooks MD [address]       Erika Mondragon PA-C

## 2024-10-15 NOTE — CARE PLAN
The patient's goals for the shift include pt will be free from nausea    The clinical goals for the shift include pt will remain stable    Problem: Fall/Injury  Goal: Pace activities to prevent fatigue by end of the shift  10/15/2024 1118 by Perry Cameron RN  Outcome: Progressing  10/15/2024 1118 by Perry Cameron RN  Outcome: Progressing     Problem: Fall/Injury  Goal: Verbalize understanding of personal risk factors for fall in the hospital  10/15/2024 1118 by Perry Cameron RN  Outcome: Progressing  10/15/2024 1118 by Perry Cameron RN  Outcome: Progressing     Problem: Fall/Injury  Goal: Not fall by end of shift  10/15/2024 1118 by Perry aCmeron RN  Outcome: Progressing  10/15/2024 1118 by Perry Cameron RN  Outcome: Progressing

## 2024-10-15 NOTE — PROGRESS NOTES
"Pharmacy Medication History Review    Domingo Greer is a 63 y.o. male admitted for Prostate cancer ([facility]). Pharmacy reviewed the patient's hjqvs-[id number] medications and allergies for accuracy.    Medications ADDED:  N/A  Medications CHANGED:  N/A  Medications REMOVED:   N/A     The list below reflects the updated PTA list.   Prior to Admission Medications   Prescriptions Last Dose Informant   albuterol 90 mcg/actuation inhaler Past Month Self   Sig: inhale 1 to 2 puffs by mouth every 4 to 6 hours if needed   aspirin 81 mg EC tablet 10/14/2024 Self   Sig: Take 1 tablet (81 mg) by mouth once daily.   carvedilol (Coreg) 25 mg tablet 10/14/2024 Self   Sig: Take 1 tablet (25 mg) by mouth 2 times daily (morning and late afternoon).   furosemide (Lasix) 80 mg tablet 10/14/2024 Self   Sig: Take 1 tablet (80 mg) by mouth once daily.   ibuprofen 200 mg tablet Past Week Self   Sig: Take 4 tablets (800 mg) by mouth once daily.   losartan (Cozaar) 25 mg tablet 10/13/2024 Self   Sig: Take 1 tablet (25 mg) by mouth once daily.      Facility-Administered Medications: None        The list below reflects the updated allergy list. Please review each documented allergy for additional clarification and justification.  Allergies  Reviewed by Bryson Martinez on 10/15/2024        Severity Reactions Comments    Other High Anaphylaxis Bee sting    Penicillin High Anaphylaxis per family history-2 brothers    Bee Venom Protein (honey Bee) Not Specified Unknown             Patient declines M2B at discharge.     Sources:   Medication dispense history  Patient interviewed at bedside (patient was a good historian)  OARRS    Additional Comments:  N/A      BRYSON MARTINEZ  Pharmacy Technician  10/15/24     Secure Chat preferred   If no response call [phone] or [facility] \"Med Rec\"   "

## 2024-10-15 NOTE — CARE PLAN
The patient's goals for the shift include pt will be free from nausea    The clinical goals for the shift include pt will have improve management    Problem: Fall/Injury  Goal: Not fall by end of shift  Outcome: Progressing     Problem: Pain  Goal: Performs ADL's with improved pain control throughout shift  Outcome: Progressing     Problem: Pain  Goal: Takes deep breaths with improved pain control throughout the shift  Outcome: Progressing     Problem: Pain  Goal: Free from acute confusion related to pain meds throughout the shift  Outcome: Progressing

## 2024-10-17 ENCOUNTER — PATIENT OUTREACH (OUTPATIENT)
Dept: CARE COORDINATION | Facility: CLINIC | Age: 64
End: 2024-10-17

## 2024-10-17 SDOH — ECONOMIC STABILITY: GENERAL: WOULD YOU LIKE HELP WITH ANY OF THE FOLLOWING NEEDS?: I DONT NEED HELP WITH ANY OF THESE

## 2024-10-17 SDOH — ECONOMIC STABILITY: FOOD INSECURITY
ARE ANY OF YOUR NEEDS URGENT? FOR EXAMPLE, UNCERTAINTY OF WHERE YOU WILL GET YOUR NEXT MEAL OR NOT HAVING THE MEDICATIONS YOU NEED TO TAKE TOMORROW.: NO

## 2024-10-17 NOTE — PROGRESS NOTES
Outreach call to patient to support a smooth transition of care from recent admission.  Spoke with patient, reviewed discharge medications, discharge instructions, assessed social needs, and provided education on importance of follow-up appointment with provider.  Will continue to monitor through transition period.  Discharge Diagnosis  Prostate cancer (Multi)     Issues Requiring Follow-Up  TOV 10/21, POV 11/21    Medications  Medications reviewed with patient/caregiver?: Yes (10/17/2024  9:43 AM)  Is the patient having any side effects they believe may be caused by any medication additions or changes?: No (10/17/2024  9:43 AM)  Does the patient have all medications ordered at discharge?: Yes (10/17/2024  9:43 AM)  Care Management Interventions: Provided patient education (10/17/2024  9:43 AM)  Is the patient taking all medications as directed (includes completed medication regime)?: Yes (10/17/2024  9:43 AM)  Care Management Interventions: Provided patient education (10/17/2024  9:43 AM)    Appointments  Does the patient have a primary care provider?: Yes (10/17/2024  9:43 AM)  Care Management Interventions: Educated patient on importance of making appointment (10/17/2024  9:43 AM)  Has the patient kept scheduled appointments due by today?: Yes (10/17/2024  9:43 AM)  Care Management Interventions: Advised patient to keep appointment (10/17/2024  9:43 AM)    Self Management  What is the home health agency?: none for Mr. Greer (10/17/2024  9:43 AM)  What Durable Medical Equipment (DME) was ordered?: none for Mr. Greer (10/17/2024  9:43 AM)    Patient Teaching  Does the patient have access to their discharge instructions?: Yes (10/17/2024  9:43 AM)  Care Management Interventions: Reviewed instructions with patient (10/17/2024  9:43 AM)  What is the patient's perception of their health status since discharge?: Improving (10/17/2024  9:43 AM)  Is the patient/caregiver able to teach back the hierarchy of who to  call/visit for symptoms/problems? PCP, Specialist, Home Health nurse, Urgent Care, ED, 911: Yes (10/17/2024  9:43 AM)            Patient home with urinary catheter. SHADY  drain was pulled prior to discharge. Had some concerns about amount of fluid coming from site of shady. This sometimes happen on the day the drain is pulled after removal. Patient would like me to call to NP and have them call him . Will send secure chat .   Education-  Call a healthcare provider right away if:    You have a fever over 100.5 degrees Fahrenheit (38 degrees Celsius).  Your pee becomes cloudy.  Your pee has a strong or foul odor.  Pee is leaking around your Madrigal catheter.  You have pain or fullness in your abdomen.  You notice blood clots or blood in your urine (hematuria).  You have little or no pee flow into the collection bag.  Your Madrigal catheter comes out.      Thank you,   Aimee Painting , RN   Nurse Care Manager   [facility] Accountable Care Department   [phone]

## 2024-10-18 ENCOUNTER — HOSPITAL ENCOUNTER (EMERGENCY)
Facility: HOSPITAL | Age: 64
Discharge: OTHER INSTITUTION | End: 2024-10-18
Attending: STUDENT IN AN ORGANIZED HEALTH CARE EDUCATION/TRAINING PROGRAM
Payer: COMMERCIAL

## 2024-10-18 ENCOUNTER — APPOINTMENT (OUTPATIENT)
Dept: RADIOLOGY | Facility: HOSPITAL | Age: 64
End: 2024-10-18
Payer: COMMERCIAL

## 2024-10-18 ENCOUNTER — TELEPHONE (OUTPATIENT)
Dept: UROLOGY | Facility: HOSPITAL | Age: 64
End: 2024-10-18

## 2024-10-18 VITALS
HEIGHT: 72 IN | BODY MASS INDEX: 35.89 KG/M2 | HEART RATE: 93 BPM | DIASTOLIC BLOOD PRESSURE: 78 MMHG | SYSTOLIC BLOOD PRESSURE: 143 MMHG | TEMPERATURE: 99.1 F | WEIGHT: 265 LBS | OXYGEN SATURATION: 99 % | RESPIRATION RATE: 18 BRPM

## 2024-10-18 DIAGNOSIS — R31.9 URINARY TRACT INFECTION WITH HEMATURIA, SITE UNSPECIFIED: Primary | ICD-10-CM

## 2024-10-18 DIAGNOSIS — N39.0 URINARY TRACT INFECTION WITH HEMATURIA, SITE UNSPECIFIED: Primary | ICD-10-CM

## 2024-10-18 LAB
ALBUMIN SERPL BCP-MCNC: 4.1 G/DL (ref 3.4–5)
ALP SERPL-CCNC: 45 U/L (ref 33–136)
ALT SERPL W P-5'-P-CCNC: 37 U/L (ref 10–52)
ANION GAP SERPL CALC-SCNC: 11 MMOL/L (ref 10–20)
APPEARANCE UR: ABNORMAL
AST SERPL W P-5'-P-CCNC: 30 U/L (ref 9–39)
BASOPHILS # BLD AUTO: 0.03 X10*3/UL (ref 0–0.1)
BASOPHILS NFR BLD AUTO: 0.2 %
BILIRUB SERPL-MCNC: 1.3 MG/DL (ref 0–1.2)
BILIRUB UR STRIP.AUTO-MCNC: NEGATIVE MG/DL
BUN SERPL-MCNC: 25 MG/DL (ref 6–23)
CALCIUM SERPL-MCNC: 8.9 MG/DL (ref 8.6–10.3)
CHLORIDE SERPL-SCNC: 99 MMOL/L (ref 98–107)
CO2 SERPL-SCNC: 30 MMOL/L (ref 21–32)
COLOR UR: YELLOW
CREAT SERPL-MCNC: 0.95 MG/DL (ref 0.5–1.3)
EGFRCR SERPLBLD CKD-EPI 2021: 90 ML/MIN/1.73M*2
EOSINOPHIL # BLD AUTO: 0.24 X10*3/UL (ref 0–0.7)
EOSINOPHIL NFR BLD AUTO: 1.8 %
ERYTHROCYTE [DISTWIDTH] IN BLOOD BY AUTOMATED COUNT: 12.2 % (ref 11.5–14.5)
GLUCOSE SERPL-MCNC: 109 MG/DL (ref 74–99)
GLUCOSE UR STRIP.AUTO-MCNC: NORMAL MG/DL
HCT VFR BLD AUTO: 46 % (ref 41–52)
HGB BLD-MCNC: 15.6 G/DL (ref 13.5–17.5)
IMM GRANULOCYTES # BLD AUTO: 0.07 X10*3/UL (ref 0–0.7)
IMM GRANULOCYTES NFR BLD AUTO: 0.5 % (ref 0–0.9)
KETONES UR STRIP.AUTO-MCNC: ABNORMAL MG/DL
LEUKOCYTE ESTERASE UR QL STRIP.AUTO: ABNORMAL
LYMPHOCYTES # BLD AUTO: 0.39 X10*3/UL (ref 1.2–4.8)
LYMPHOCYTES NFR BLD AUTO: 3 %
MCH RBC QN AUTO: 31.8 PG (ref 26–34)
MCHC RBC AUTO-ENTMCNC: 33.9 G/DL (ref 32–36)
MCV RBC AUTO: 94 FL (ref 80–100)
MONOCYTES # BLD AUTO: 0.45 X10*3/UL (ref 0.1–1)
MONOCYTES NFR BLD AUTO: 3.4 %
MUCOUS THREADS #/AREA URNS AUTO: ABNORMAL /LPF
NEUTROPHILS # BLD AUTO: 11.87 X10*3/UL (ref 1.2–7.7)
NEUTROPHILS NFR BLD AUTO: 91.1 %
NITRITE UR QL STRIP.AUTO: NEGATIVE
NRBC BLD-RTO: 0 /100 WBCS (ref 0–0)
PH UR STRIP.AUTO: 6 [PH]
PLATELET # BLD AUTO: 214 X10*3/UL (ref 150–450)
POTASSIUM SERPL-SCNC: 3.9 MMOL/L (ref 3.5–5.3)
PROT SERPL-MCNC: 6.7 G/DL (ref 6.4–8.2)
PROT UR STRIP.AUTO-MCNC: ABNORMAL MG/DL
RBC # BLD AUTO: 4.9 X10*6/UL (ref 4.5–5.9)
RBC # UR STRIP.AUTO: ABNORMAL /UL
RBC #/AREA URNS AUTO: >20 /HPF
SODIUM SERPL-SCNC: 136 MMOL/L (ref 136–145)
SP GR UR STRIP.AUTO: 1.03
UROBILINOGEN UR STRIP.AUTO-MCNC: NORMAL MG/DL
WBC # BLD AUTO: 13.1 X10*3/UL (ref 4.4–11.3)
WBC #/AREA URNS AUTO: ABNORMAL /HPF

## 2024-10-18 PROCEDURE — 2500000004 HC RX 250 GENERAL PHARMACY W/ HCPCS (ALT 636 FOR OP/ED): Mod: JZ | Performed by: STUDENT IN AN ORGANIZED HEALTH CARE EDUCATION/TRAINING PROGRAM

## 2024-10-18 PROCEDURE — 87086 URINE CULTURE/COLONY COUNT: CPT | Mod: PORLAB | Performed by: STUDENT IN AN ORGANIZED HEALTH CARE EDUCATION/TRAINING PROGRAM

## 2024-10-18 PROCEDURE — 36415 COLL VENOUS BLD VENIPUNCTURE: CPT | Performed by: STUDENT IN AN ORGANIZED HEALTH CARE EDUCATION/TRAINING PROGRAM

## 2024-10-18 PROCEDURE — 74177 CT ABD & PELVIS W/CONTRAST: CPT | Performed by: RADIOLOGY

## 2024-10-18 PROCEDURE — 80053 COMPREHEN METABOLIC PANEL: CPT | Performed by: STUDENT IN AN ORGANIZED HEALTH CARE EDUCATION/TRAINING PROGRAM

## 2024-10-18 PROCEDURE — 96375 TX/PRO/DX INJ NEW DRUG ADDON: CPT

## 2024-10-18 PROCEDURE — 87075 CULTR BACTERIA EXCEPT BLOOD: CPT | Mod: PORLAB,59 | Performed by: STUDENT IN AN ORGANIZED HEALTH CARE EDUCATION/TRAINING PROGRAM

## 2024-10-18 PROCEDURE — 96365 THER/PROPH/DIAG IV INF INIT: CPT | Mod: 59

## 2024-10-18 PROCEDURE — 96376 TX/PRO/DX INJ SAME DRUG ADON: CPT

## 2024-10-18 PROCEDURE — 85025 COMPLETE CBC W/AUTO DIFF WBC: CPT | Performed by: STUDENT IN AN ORGANIZED HEALTH CARE EDUCATION/TRAINING PROGRAM

## 2024-10-18 PROCEDURE — 74177 CT ABD & PELVIS W/CONTRAST: CPT

## 2024-10-18 PROCEDURE — 81001 URINALYSIS AUTO W/SCOPE: CPT | Performed by: STUDENT IN AN ORGANIZED HEALTH CARE EDUCATION/TRAINING PROGRAM

## 2024-10-18 PROCEDURE — 96367 TX/PROPH/DG ADDL SEQ IV INF: CPT

## 2024-10-18 PROCEDURE — 2550000001 HC RX 255 CONTRASTS: Performed by: STUDENT IN AN ORGANIZED HEALTH CARE EDUCATION/TRAINING PROGRAM

## 2024-10-18 PROCEDURE — 2500000001 HC RX 250 WO HCPCS SELF ADMINISTERED DRUGS (ALT 637 FOR MEDICARE OP): Performed by: STUDENT IN AN ORGANIZED HEALTH CARE EDUCATION/TRAINING PROGRAM

## 2024-10-18 PROCEDURE — 99285 EMERGENCY DEPT VISIT HI MDM: CPT

## 2024-10-18 RX ORDER — METRONIDAZOLE 500 MG/100ML
500 INJECTION, SOLUTION INTRAVENOUS EVERY 8 HOURS
Status: DISCONTINUED | OUTPATIENT
Start: 2024-10-18 | End: 2024-10-19 | Stop reason: HOSPADM

## 2024-10-18 RX ORDER — ACETAMINOPHEN 325 MG/1
975 TABLET ORAL ONCE
Status: COMPLETED | OUTPATIENT
Start: 2024-10-18 | End: 2024-10-18

## 2024-10-18 RX ORDER — MORPHINE SULFATE 4 MG/ML
4 INJECTION INTRAVENOUS ONCE
Status: COMPLETED | OUTPATIENT
Start: 2024-10-18 | End: 2024-10-18

## 2024-10-18 RX ORDER — CEFEPIME 1 G/50ML
2 INJECTION, SOLUTION INTRAVENOUS EVERY 8 HOURS
Status: DISCONTINUED | OUTPATIENT
Start: 2024-10-19 | End: 2024-10-19 | Stop reason: HOSPADM

## 2024-10-18 RX ORDER — CEFTRIAXONE 1 G/50ML
1 INJECTION, SOLUTION INTRAVENOUS ONCE
Status: COMPLETED | OUTPATIENT
Start: 2024-10-18 | End: 2024-10-18

## 2024-10-18 ASSESSMENT — PAIN DESCRIPTION - LOCATION
LOCATION: ABDOMEN
LOCATION: ABDOMEN

## 2024-10-18 ASSESSMENT — PAIN SCALES - GENERAL
PAINLEVEL_OUTOF10: 3
PAINLEVEL_OUTOF10: 0 - NO PAIN
PAINLEVEL_OUTOF10: 4
PAINLEVEL_OUTOF10: 7

## 2024-10-18 ASSESSMENT — PAIN - FUNCTIONAL ASSESSMENT: PAIN_FUNCTIONAL_ASSESSMENT: 0-10

## 2024-10-18 ASSESSMENT — LIFESTYLE VARIABLES
EVER HAD A DRINK FIRST THING IN THE MORNING TO STEADY YOUR NERVES TO GET RID OF A HANGOVER: NO
HAVE YOU EVER FELT YOU SHOULD CUT DOWN ON YOUR DRINKING: NO
HAVE PEOPLE ANNOYED YOU BY CRITICIZING YOUR DRINKING: NO
TOTAL SCORE: 0
EVER FELT BAD OR GUILTY ABOUT YOUR DRINKING: NO

## 2024-10-18 ASSESSMENT — COLUMBIA-SUICIDE SEVERITY RATING SCALE - C-SSRS
1. IN THE PAST MONTH, HAVE YOU WISHED YOU WERE DEAD OR WISHED YOU COULD GO TO SLEEP AND NOT WAKE UP?: NO
2. HAVE YOU ACTUALLY HAD ANY THOUGHTS OF KILLING YOURSELF?: NO
6. HAVE YOU EVER DONE ANYTHING, STARTED TO DO ANYTHING, OR PREPARED TO DO ANYTHING TO END YOUR LIFE?: NO

## 2024-10-18 ASSESSMENT — PAIN DESCRIPTION - DESCRIPTORS: DESCRIPTORS: ACHING

## 2024-10-18 NOTE — ED PROVIDER NOTES
HPI   Chief Complaint   Patient presents with    fever/ abdominal pain s/p prostate surgery monday       63-year-old male with a robotic assisted prostatectomy done on October 14 for prostate cancer presents ED with concerns for chills.  Patient that he has had chills today and myalgias.  He has been unable to document a fever.  Does not have any worsening abdominal pain than the pain he is having since the surgery.  No nausea vomiting or diarrhea.  No chest pain, shortness of breath cough or congestion.  Patient is not on any chemotherapy.  Patient has been having some drainage from one of his midline incisions.  He is unsure if the skin on his abdomen is become more red because he cannot see it.              Patient History   Past Medical History:   Diagnosis Date    Arrhythmia     s/p RFA Oct 2022 with Dr. Yeager    Asthma     BPH (benign prostatic hyperplasia)     Cellulitis, unspecified     Cellulitis    Dilated cardiomyopathy (Multi)     DVT (deep venous thrombosis) (Multi)     multiple, several years ago, s/p DOAC    Hyperlipidemia     Hypertension     Low testosterone in male     Peripheral vascular disease (CMS-HCC)     Prostate cancer (Multi)     Seizure (Multi)     in the setting of fever/infection    Sleep apnea     CPAP    Venous insufficiency of both lower extremities     Venous ulcer (Multi)     right foot     Past Surgical History:   Procedure Laterality Date    ANKLE SURGERY Right 11/13/2013    Ankle Surgery    CARDIAC CATHETERIZATION      CARDIAC ELECTROPHYSIOLOGY MAPPING AND ABLATION      for afib    CHOLECYSTECTOMY  11/13/2013    Cholecystectomy    COLONOSCOPY      OTHER SURGICAL HISTORY  09/23/2022    Vascular surgical procedure    PROSTATE BIOPSY  01/2024    PROSTATECTOMY N/A 10/14/2024    robotic radical prostatectomy with BPLND    ROTATOR CUFF REPAIR Bilateral 11/13/2013    Rotator Cuff Repair    WISDOM TOOTH EXTRACTION       Family History   Problem Relation Name Age of Onset     Hypertension Mother      Prostate cancer Father       Social History     Tobacco Use    Smoking status: Former     Types: Cigarettes    Smokeless tobacco: Never   Vaping Use    Vaping status: Never Used   Substance Use Topics    Alcohol use: Yes     Alcohol/week: 2.0 standard drinks of alcohol     Types: 2 Shots of liquor per week    Drug use: Never       Physical Exam   ED Triage Vitals [10/18/24 1432]   Temperature Heart Rate Respirations BP   37.7 °C (99.8 °F) 100 18 157/78      Pulse Ox Temp Source Heart Rate Source Patient Position   95 % Oral Monitor Sitting      BP Location FiO2 (%)     Left arm --       Physical Exam  Vitals and nursing note reviewed.   Constitutional:       General: He is not in acute distress.     Appearance: He is well-developed.   HENT:      Head: Normocephalic and atraumatic.   Eyes:      Conjunctiva/sclera: Conjunctivae normal.   Cardiovascular:      Rate and Rhythm: Normal rate and regular rhythm.      Heart sounds: No murmur heard.  Pulmonary:      Effort: Pulmonary effort is normal. No respiratory distress.      Breath sounds: Normal breath sounds.   Abdominal:      Palpations: Abdomen is soft.      Tenderness: There is no abdominal tenderness.      Comments: Multiple laparoscopic incisions across the abdomen.  The central 1 has some serosanguineous fluid draining from it.  The left lateral incision seems to have dehisced at the skin and has no drainage.  There is mild erythema around the central abdominal incision.   Musculoskeletal:         General: No swelling.      Cervical back: Neck supple.   Skin:     General: Skin is warm and dry.      Capillary Refill: Capillary refill takes less than 2 seconds.   Neurological:      Mental Status: He is alert.   Psychiatric:         Mood and Affect: Mood normal.           ED Course & MDM   ED Course as of 10/21/24 1127   Fri Oct 18, 2024   3919 IMPRESSION:  1.  Status post recent prostatectomy with treatment related scattered  gas and  minimal fluid within the peritoneal cavity.  2. Bilateral retroperitoneal simple fluid collections and gas  adjacent to the medial aspect of the external iliac vessels estimated  at 13 mL on the right and 8 mL on the left.  3. Body wall edema and trace gas.  4. Probable postoperative ileus and likely postoperative subsegmental  atelectasis left base.  5. Similar bilateral extraperitoneal small indirect fat containing  inguinal hernias.  6. Similar prominent inguinal lymph nodes which may be reactive  although nonspecific. Attention recommended at follow-up.  7. Newly seen minimal gas along the right sporadic cord likely  related to recent surgery although attention to this region  recommended on clinical assessment.   [CF]      ED Course User Index  [CF] Saniya Lindsey MD         Diagnoses as of 10/21/24 1127   Urinary tract infection with hematuria, site unspecified                 No data recorded     Heaters Coma Scale Score: 15 (10/18/24 1434 : Juliana Reddy RN)                           Medical Decision Making  HISTORIAN:  Patient    CHART REVIEW:  Patient had a robot-assisted prostatectomy done 4 days ago for prostate cancer.  Was with Dr. Steele.    PT SUMMARY:  63-year-old male presents ED with chills.  Vital signs stable.    DDX:  UTI, electrolyte abnormality, intra-abdominal infection    PLAN:  Obtain CBC, BMP, UA, CT abdomen pelvis    DISPO/RE-EVAL:  Patient's workup to this time shows signs of a leukocytosis of 13,000.  Does have signs of infection on UA today.  This point time patient be signed out to oncoming provider awaiting CT abdomen and pelvis.  If CT abdomen pelvis is shows no acute abnormalities seen likely discharged home with antibiotics for UTI.          Procedure  Procedures     Ramon Prado,   10/18/24 1715       Ramon Prado,   10/21/24 1127

## 2024-10-18 NOTE — TELEPHONE ENCOUNTER
Patient called and left message stating he is experiencing fever and chills. He underwent surgery with Dr. Mcdaniel on Monday. I called patient at to inform him to head to the nearest emergency room. Patient is on his way to [place] ED currently. Dr. Saravia will be notified.

## 2024-10-18 NOTE — PROGRESS NOTES
I have accept care of this patient in signout.    In summary:  I received this patient in signout in summary is a 63-year-old male who had a prostatectomy in October on the 14th for prostate cancers and presented for chills.  He came for worsening abdominal pain since the surgery.  Patient's workup shows he was originally tachycardic afebrile he does have a leukocytosis of 13.1 I talked with Dr. Del Rio who accepts transfer patient he does have a UTI using antibiotics.  Patient was agreeable plan.      Labs Reviewed   CBC WITH AUTO DIFFERENTIAL - Abnormal       Result Value    WBC 13.1 (*)     nRBC 0.0      RBC 4.90      Hemoglobin 15.6      Hematocrit 46.0      MCV 94      MCH 31.8      MCHC 33.9      RDW 12.2      Platelets 214      Neutrophils % 91.1      Immature Granulocytes %, Automated 0.5      Lymphocytes % 3.0      Monocytes % 3.4      Eosinophils % 1.8      Basophils % 0.2      Neutrophils Absolute 11.87 (*)     Immature Granulocytes Absolute, Automated 0.07      Lymphocytes Absolute 0.39 (*)     Monocytes Absolute 0.45      Eosinophils Absolute 0.24      Basophils Absolute 0.03     COMPREHENSIVE METABOLIC PANEL - Abnormal    Glucose 109 (*)     Sodium 136      Potassium 3.9      Chloride 99      Bicarbonate 30      Anion Gap 11      Urea Nitrogen 25 (*)     Creatinine 0.95      eGFR 90      Calcium 8.9      Albumin 4.1      Alkaline Phosphatase 45      Total Protein 6.7      AST 30      Bilirubin, Total 1.3 (*)     ALT 37     URINALYSIS WITH REFLEX CULTURE AND MICROSCOPIC - Abnormal    Color, Urine Yellow      Appearance, Urine Turbid (*)     Specific Gravity, Urine 1.028      pH, Urine 6.0      Protein, Urine 70 (1+) (*)     Glucose, Urine Normal      Blood, Urine OVER (3+) (*)     Ketones, Urine 10 (1+) (*)     Bilirubin, Urine NEGATIVE      Urobilinogen, Urine Normal      Nitrite, Urine NEGATIVE      Leukocyte Esterase, Urine 500 Mervat/µL (*)     Narrative:     OVER is reported when the result is  greater than the clinically reportable range.   MICROSCOPIC ONLY, URINE - Abnormal    WBC, Urine 21-50 (*)     RBC, Urine >20 (*)     Mucus, Urine FEW     URINE CULTURE   URINALYSIS WITH REFLEX CULTURE AND MICROSCOPIC    Narrative:     The following orders were created for panel order Urinalysis with Reflex Culture and Microscopic.  Procedure                               Abnormality         Status                     ---------                               -----------         ------                     Urinalysis with Reflex C...[[id number]]  Abnormal            Final result               Extra Urine Gray Tube[[id number]]                            In process                   Please view results for these tests on the individual orders.   EXTRA URINE GRAY TUBE     CT abdomen pelvis w IV contrast   Final Result   1.  Status post recent prostatectomy with treatment related scattered   gas and minimal fluid within the peritoneal cavity.   2. Bilateral retroperitoneal simple fluid collections and gas   adjacent to the medial aspect of the external iliac vessels estimated   at 13 mL on the right and 8 mL on the left.   3. Body wall edema and trace gas.   4. Probable postoperative ileus and likely postoperative subsegmental   atelectasis left base.   5. Similar bilateral extraperitoneal small indirect fat containing   inguinal hernias.   6. Similar prominent inguinal lymph nodes which may be reactive   although nonspecific. Attention recommended at follow-up.   7. Newly seen minimal gas along the right sporadic cord likely   related to recent surgery although attention to this region   recommended on clinical assessment.             MACRO:   None        Signed by: Wilner Pérez 10/18/2024 5:33 PM   Dictation workstation:   [id number]

## 2024-10-19 ENCOUNTER — HOSPITAL ENCOUNTER (INPATIENT)
Facility: HOSPITAL | Age: 64
LOS: 4 days | Discharge: HOME | End: 2024-10-23
Attending: UROLOGY | Admitting: STUDENT IN AN ORGANIZED HEALTH CARE EDUCATION/TRAINING PROGRAM
Payer: COMMERCIAL

## 2024-10-19 ENCOUNTER — APPOINTMENT (OUTPATIENT)
Dept: RADIOLOGY | Facility: HOSPITAL | Age: 64
End: 2024-10-19
Payer: COMMERCIAL

## 2024-10-19 DIAGNOSIS — C61 PROSTATE CANCER (MULTI): Primary | ICD-10-CM

## 2024-10-19 DIAGNOSIS — R19.7 DIARRHEA IN ADULT PATIENT: ICD-10-CM

## 2024-10-19 LAB
ANION GAP SERPL CALC-SCNC: 15 MMOL/L (ref 10–20)
ANION GAP SERPL CALC-SCNC: 18 MMOL/L (ref 10–20)
BUN SERPL-MCNC: 26 MG/DL (ref 6–23)
BUN SERPL-MCNC: 26 MG/DL (ref 6–23)
CALCIUM SERPL-MCNC: 8.8 MG/DL (ref 8.6–10.6)
CALCIUM SERPL-MCNC: 9.1 MG/DL (ref 8.6–10.6)
CHLORIDE SERPL-SCNC: 95 MMOL/L (ref 98–107)
CHLORIDE SERPL-SCNC: 96 MMOL/L (ref 98–107)
CO2 SERPL-SCNC: 28 MMOL/L (ref 21–32)
CO2 SERPL-SCNC: 28 MMOL/L (ref 21–32)
CREAT SERPL-MCNC: 0.96 MG/DL (ref 0.5–1.3)
CREAT SERPL-MCNC: 1.07 MG/DL (ref 0.5–1.3)
EGFRCR SERPLBLD CKD-EPI 2021: 78 ML/MIN/1.73M*2
EGFRCR SERPLBLD CKD-EPI 2021: 89 ML/MIN/1.73M*2
ERYTHROCYTE [DISTWIDTH] IN BLOOD BY AUTOMATED COUNT: 12.2 % (ref 11.5–14.5)
ERYTHROCYTE [DISTWIDTH] IN BLOOD BY AUTOMATED COUNT: 12.6 % (ref 11.5–14.5)
GLUCOSE SERPL-MCNC: 107 MG/DL (ref 74–99)
GLUCOSE SERPL-MCNC: 70 MG/DL (ref 74–99)
HCT VFR BLD AUTO: 46.4 % (ref 41–52)
HCT VFR BLD AUTO: 48.3 % (ref 41–52)
HGB BLD-MCNC: 15.6 G/DL (ref 13.5–17.5)
HGB BLD-MCNC: 15.8 G/DL (ref 13.5–17.5)
HOLD SPECIMEN: NORMAL
MAGNESIUM SERPL-MCNC: 1.89 MG/DL (ref 1.6–2.4)
MCH RBC QN AUTO: 31.4 PG (ref 26–34)
MCH RBC QN AUTO: 32 PG (ref 26–34)
MCHC RBC AUTO-ENTMCNC: 32.7 G/DL (ref 32–36)
MCHC RBC AUTO-ENTMCNC: 33.6 G/DL (ref 32–36)
MCV RBC AUTO: 93 FL (ref 80–100)
MCV RBC AUTO: 98 FL (ref 80–100)
NRBC BLD-RTO: 0 /100 WBCS (ref 0–0)
NRBC BLD-RTO: 0 /100 WBCS (ref 0–0)
PLATELET # BLD AUTO: 214 X10*3/UL (ref 150–450)
PLATELET # BLD AUTO: 225 X10*3/UL (ref 150–450)
POTASSIUM SERPL-SCNC: 3.7 MMOL/L (ref 3.5–5.3)
POTASSIUM SERPL-SCNC: 3.8 MMOL/L (ref 3.5–5.3)
RBC # BLD AUTO: 4.94 X10*6/UL (ref 4.5–5.9)
RBC # BLD AUTO: 4.97 X10*6/UL (ref 4.5–5.9)
SODIUM SERPL-SCNC: 135 MMOL/L (ref 136–145)
SODIUM SERPL-SCNC: 137 MMOL/L (ref 136–145)
WBC # BLD AUTO: 19.5 X10*3/UL (ref 4.4–11.3)
WBC # BLD AUTO: 19.6 X10*3/UL (ref 4.4–11.3)

## 2024-10-19 PROCEDURE — 71045 X-RAY EXAM CHEST 1 VIEW: CPT

## 2024-10-19 PROCEDURE — 2500000002 HC RX 250 W HCPCS SELF ADMINISTERED DRUGS (ALT 637 FOR MEDICARE OP, ALT 636 FOR OP/ED)

## 2024-10-19 PROCEDURE — 36415 COLL VENOUS BLD VENIPUNCTURE: CPT | Performed by: STUDENT IN AN ORGANIZED HEALTH CARE EDUCATION/TRAINING PROGRAM

## 2024-10-19 PROCEDURE — 87493 C DIFF AMPLIFIED PROBE: CPT

## 2024-10-19 PROCEDURE — 83735 ASSAY OF MAGNESIUM: CPT | Performed by: STUDENT IN AN ORGANIZED HEALTH CARE EDUCATION/TRAINING PROGRAM

## 2024-10-19 PROCEDURE — 85027 COMPLETE CBC AUTOMATED: CPT | Performed by: STUDENT IN AN ORGANIZED HEALTH CARE EDUCATION/TRAINING PROGRAM

## 2024-10-19 PROCEDURE — 80048 BASIC METABOLIC PNL TOTAL CA: CPT | Performed by: STUDENT IN AN ORGANIZED HEALTH CARE EDUCATION/TRAINING PROGRAM

## 2024-10-19 PROCEDURE — 1200000003 HC ONCOLOGY  ROOM WITH TELEMETRY DAILY

## 2024-10-19 PROCEDURE — 2500000001 HC RX 250 WO HCPCS SELF ADMINISTERED DRUGS (ALT 637 FOR MEDICARE OP): Performed by: STUDENT IN AN ORGANIZED HEALTH CARE EDUCATION/TRAINING PROGRAM

## 2024-10-19 PROCEDURE — 2500000002 HC RX 250 W HCPCS SELF ADMINISTERED DRUGS (ALT 637 FOR MEDICARE OP, ALT 636 FOR OP/ED): Performed by: STUDENT IN AN ORGANIZED HEALTH CARE EDUCATION/TRAINING PROGRAM

## 2024-10-19 PROCEDURE — 2500000004 HC RX 250 GENERAL PHARMACY W/ HCPCS (ALT 636 FOR OP/ED)

## 2024-10-19 PROCEDURE — 2500000004 HC RX 250 GENERAL PHARMACY W/ HCPCS (ALT 636 FOR OP/ED): Performed by: STUDENT IN AN ORGANIZED HEALTH CARE EDUCATION/TRAINING PROGRAM

## 2024-10-19 RX ORDER — OXYCODONE HYDROCHLORIDE 5 MG/1
5 TABLET ORAL EVERY 6 HOURS PRN
Status: DISCONTINUED | OUTPATIENT
Start: 2024-10-19 | End: 2024-10-19

## 2024-10-19 RX ORDER — ONDANSETRON HYDROCHLORIDE 2 MG/ML
4 INJECTION, SOLUTION INTRAVENOUS EVERY 8 HOURS PRN
Status: DISCONTINUED | OUTPATIENT
Start: 2024-10-19 | End: 2024-10-23 | Stop reason: HOSPADM

## 2024-10-19 RX ORDER — HEPARIN SODIUM 5000 [USP'U]/ML
5000 INJECTION, SOLUTION INTRAVENOUS; SUBCUTANEOUS EVERY 8 HOURS
Status: DISCONTINUED | OUTPATIENT
Start: 2024-10-19 | End: 2024-10-23 | Stop reason: HOSPADM

## 2024-10-19 RX ORDER — FUROSEMIDE 40 MG/1
80 TABLET ORAL DAILY
Status: DISCONTINUED | OUTPATIENT
Start: 2024-10-19 | End: 2024-10-23 | Stop reason: HOSPADM

## 2024-10-19 RX ORDER — VANCOMYCIN HYDROCHLORIDE 1 G/20ML
INJECTION, POWDER, LYOPHILIZED, FOR SOLUTION INTRAVENOUS DAILY PRN
Status: DISCONTINUED | OUTPATIENT
Start: 2024-10-19 | End: 2024-10-20

## 2024-10-19 RX ORDER — CARVEDILOL 25 MG/1
25 TABLET ORAL
Status: DISCONTINUED | OUTPATIENT
Start: 2024-10-19 | End: 2024-10-23 | Stop reason: HOSPADM

## 2024-10-19 RX ORDER — LANOLIN ALCOHOL/MO/W.PET/CERES
800 CREAM (GRAM) TOPICAL ONCE
Status: COMPLETED | OUTPATIENT
Start: 2024-10-19 | End: 2024-10-19

## 2024-10-19 RX ORDER — POTASSIUM CHLORIDE 20 MEQ/1
40 TABLET, EXTENDED RELEASE ORAL ONCE
Status: COMPLETED | OUTPATIENT
Start: 2024-10-19 | End: 2024-10-19

## 2024-10-19 RX ORDER — SODIUM CHLORIDE, SODIUM LACTATE, POTASSIUM CHLORIDE, CALCIUM CHLORIDE 600; 310; 30; 20 MG/100ML; MG/100ML; MG/100ML; MG/100ML
75 INJECTION, SOLUTION INTRAVENOUS CONTINUOUS
Status: ACTIVE | OUTPATIENT
Start: 2024-10-19 | End: 2024-10-20

## 2024-10-19 RX ORDER — PANTOPRAZOLE SODIUM 20 MG/1
20 TABLET, DELAYED RELEASE ORAL
Status: DISCONTINUED | OUTPATIENT
Start: 2024-10-19 | End: 2024-10-23 | Stop reason: HOSPADM

## 2024-10-19 RX ORDER — POLYETHYLENE GLYCOL 3350 17 G/17G
17 POWDER, FOR SOLUTION ORAL DAILY
Status: DISCONTINUED | OUTPATIENT
Start: 2024-10-19 | End: 2024-10-19

## 2024-10-19 RX ORDER — ALBUTEROL SULFATE 90 UG/1
2 INHALANT RESPIRATORY (INHALATION) EVERY 4 HOURS PRN
Status: DISCONTINUED | OUTPATIENT
Start: 2024-10-19 | End: 2024-10-23 | Stop reason: HOSPADM

## 2024-10-19 RX ORDER — ACETAMINOPHEN 325 MG/1
975 TABLET ORAL EVERY 6 HOURS
Status: DISCONTINUED | OUTPATIENT
Start: 2024-10-19 | End: 2024-10-22

## 2024-10-19 RX ORDER — ONDANSETRON 4 MG/1
4 TABLET, FILM COATED ORAL EVERY 8 HOURS PRN
Status: DISCONTINUED | OUTPATIENT
Start: 2024-10-19 | End: 2024-10-23 | Stop reason: HOSPADM

## 2024-10-19 RX ORDER — ASPIRIN 81 MG/1
81 TABLET ORAL DAILY
Status: DISCONTINUED | OUTPATIENT
Start: 2024-10-19 | End: 2024-10-23 | Stop reason: HOSPADM

## 2024-10-19 RX ORDER — SODIUM CHLORIDE, SODIUM LACTATE, POTASSIUM CHLORIDE, CALCIUM CHLORIDE 600; 310; 30; 20 MG/100ML; MG/100ML; MG/100ML; MG/100ML
75 INJECTION, SOLUTION INTRAVENOUS CONTINUOUS
Status: DISCONTINUED | OUTPATIENT
Start: 2024-10-19 | End: 2024-10-19

## 2024-10-19 RX ORDER — METRONIDAZOLE 500 MG/100ML
500 INJECTION, SOLUTION INTRAVENOUS EVERY 8 HOURS
Status: DISCONTINUED | OUTPATIENT
Start: 2024-10-19 | End: 2024-10-22

## 2024-10-19 RX ORDER — OXYCODONE HYDROCHLORIDE 5 MG/1
2.5 TABLET ORAL EVERY 6 HOURS PRN
Status: DISCONTINUED | OUTPATIENT
Start: 2024-10-19 | End: 2024-10-19

## 2024-10-19 RX ORDER — OXYCODONE HYDROCHLORIDE 5 MG/1
5 TABLET ORAL EVERY 6 HOURS PRN
Status: DISCONTINUED | OUTPATIENT
Start: 2024-10-19 | End: 2024-10-23 | Stop reason: HOSPADM

## 2024-10-19 RX ORDER — CEFEPIME 1 G/50ML
1 INJECTION, SOLUTION INTRAVENOUS EVERY 8 HOURS
Status: DISCONTINUED | OUTPATIENT
Start: 2024-10-19 | End: 2024-10-20

## 2024-10-19 RX ORDER — LOSARTAN POTASSIUM 50 MG/1
25 TABLET ORAL DAILY
Status: DISCONTINUED | OUTPATIENT
Start: 2024-10-19 | End: 2024-10-23 | Stop reason: HOSPADM

## 2024-10-19 RX ORDER — AMOXICILLIN 250 MG
2 CAPSULE ORAL 2 TIMES DAILY
Status: DISCONTINUED | OUTPATIENT
Start: 2024-10-19 | End: 2024-10-19

## 2024-10-19 RX ORDER — OXYCODONE HYDROCHLORIDE 5 MG/1
10 TABLET ORAL EVERY 6 HOURS PRN
Status: DISCONTINUED | OUTPATIENT
Start: 2024-10-19 | End: 2024-10-23 | Stop reason: HOSPADM

## 2024-10-19 SDOH — SOCIAL STABILITY: SOCIAL INSECURITY: ABUSE: ADULT

## 2024-10-19 SDOH — SOCIAL STABILITY: SOCIAL INSECURITY: ARE THERE ANY APPARENT SIGNS OF INJURIES/BEHAVIORS THAT COULD BE RELATED TO ABUSE/NEGLECT?: NO

## 2024-10-19 SDOH — SOCIAL STABILITY: SOCIAL INSECURITY: HAVE YOU HAD ANY THOUGHTS OF HARMING ANYONE ELSE?: NO

## 2024-10-19 SDOH — SOCIAL STABILITY: SOCIAL INSECURITY: HAVE YOU HAD THOUGHTS OF HARMING ANYONE ELSE?: YES

## 2024-10-19 SDOH — SOCIAL STABILITY: SOCIAL INSECURITY: WERE YOU ABLE TO COMPLETE ALL THE BEHAVIORAL HEALTH SCREENINGS?: YES

## 2024-10-19 SDOH — SOCIAL STABILITY: SOCIAL INSECURITY: HAS ANYONE EVER THREATENED TO HURT YOUR FAMILY OR YOUR PETS?: NO

## 2024-10-19 SDOH — SOCIAL STABILITY: SOCIAL INSECURITY: ARE YOU OR HAVE YOU BEEN THREATENED OR ABUSED PHYSICALLY, EMOTIONALLY, OR SEXUALLY BY ANYONE?: NO

## 2024-10-19 SDOH — SOCIAL STABILITY: SOCIAL INSECURITY: DO YOU FEEL ANYONE HAS EXPLOITED OR TAKEN ADVANTAGE OF YOU FINANCIALLY OR OF YOUR PERSONAL PROPERTY?: NO

## 2024-10-19 SDOH — SOCIAL STABILITY: SOCIAL INSECURITY: DOES ANYONE TRY TO KEEP YOU FROM HAVING/CONTACTING OTHER FRIENDS OR DOING THINGS OUTSIDE YOUR HOME?: NO

## 2024-10-19 SDOH — SOCIAL STABILITY: SOCIAL INSECURITY: DO YOU FEEL UNSAFE GOING BACK TO THE PLACE WHERE YOU ARE LIVING?: NO

## 2024-10-19 ASSESSMENT — COGNITIVE AND FUNCTIONAL STATUS - GENERAL
MOBILITY SCORE: 24
PATIENT BASELINE BEDBOUND: NO
DAILY ACTIVITIY SCORE: 24

## 2024-10-19 ASSESSMENT — ACTIVITIES OF DAILY LIVING (ADL)
HEARING - LEFT EAR: FUNCTIONAL
HEARING - RIGHT EAR: FUNCTIONAL
WALKS IN HOME: INDEPENDENT
FEEDING YOURSELF: INDEPENDENT
ADEQUATE_TO_COMPLETE_ADL: YES
BATHING: INDEPENDENT
GROOMING: INDEPENDENT
JUDGMENT_ADEQUATE_SAFELY_COMPLETE_DAILY_ACTIVITIES: YES
TOILETING: INDEPENDENT
PATIENT'S MEMORY ADEQUATE TO SAFELY COMPLETE DAILY ACTIVITIES?: YES
DRESSING YOURSELF: INDEPENDENT

## 2024-10-19 ASSESSMENT — LIFESTYLE VARIABLES
AUDIT-C TOTAL SCORE: 2
SKIP TO QUESTIONS 9-10: 1
HOW MANY STANDARD DRINKS CONTAINING ALCOHOL DO YOU HAVE ON A TYPICAL DAY: 1 OR 2
AUDIT-C TOTAL SCORE: 2
HOW OFTEN DO YOU HAVE 6 OR MORE DRINKS ON ONE OCCASION: NEVER
HOW OFTEN DO YOU HAVE A DRINK CONTAINING ALCOHOL: 2-4 TIMES A MONTH

## 2024-10-19 ASSESSMENT — PAIN DESCRIPTION - LOCATION: LOCATION: ABDOMEN

## 2024-10-19 ASSESSMENT — PATIENT HEALTH QUESTIONNAIRE - PHQ9
2. FEELING DOWN, DEPRESSED OR HOPELESS: NOT AT ALL
1. LITTLE INTEREST OR PLEASURE IN DOING THINGS: NOT AT ALL
SUM OF ALL RESPONSES TO PHQ9 QUESTIONS 1 & 2: 0

## 2024-10-19 ASSESSMENT — PAIN DESCRIPTION - DESCRIPTORS: DESCRIPTORS: ACHING

## 2024-10-19 ASSESSMENT — PAIN SCALES - GENERAL
PAINLEVEL_OUTOF10: 7
PAINLEVEL_OUTOF10: 8
PAINLEVEL_OUTOF10: 2
PAINLEVEL_OUTOF10: 8
PAINLEVEL_OUTOF10: 8
PAINLEVEL_OUTOF10: 2
PAINLEVEL_OUTOF10: 2
PAINLEVEL_OUTOF10: 8

## 2024-10-19 ASSESSMENT — COLUMBIA-SUICIDE SEVERITY RATING SCALE - C-SSRS
2. HAVE YOU ACTUALLY HAD ANY THOUGHTS OF KILLING YOURSELF?: NO
1. IN THE PAST MONTH, HAVE YOU WISHED YOU WERE DEAD OR WISHED YOU COULD GO TO SLEEP AND NOT WAKE UP?: NO
6. HAVE YOU EVER DONE ANYTHING, STARTED TO DO ANYTHING, OR PREPARED TO DO ANYTHING TO END YOUR LIFE?: NO

## 2024-10-19 ASSESSMENT — PAIN - FUNCTIONAL ASSESSMENT: PAIN_FUNCTIONAL_ASSESSMENT: 0-10

## 2024-10-19 NOTE — ED NOTES
[facility] [id number] [phone] PAS eta [id number]     Efrain Odonnell RN  10/18/24 213

## 2024-10-19 NOTE — PROGRESS NOTES
Domingo Greer is a 63 y.o. male on day 0 of admission presenting with Prostate cancer (Multi).    Subjective   Pain improved overnight though still endorsing intermittent chills and has had 7 episodes of diarrhea since arrival overnight       Objective     Physical Exam  Constitutional:       General: He is not in acute distress.     Appearance: Normal appearance. He is not ill-appearing or toxic-appearing.   HENT:      Head: Normocephalic and atraumatic.   Eyes:      Extraocular Movements: Extraocular movements intact.   Cardiovascular:      Rate and Rhythm: Normal rate.   Pulmonary:      Effort: Pulmonary effort is normal. No respiratory distress.   Abdominal:      General: There is no distension.      Palpations: Abdomen is soft.      Tenderness: There is no abdominal tenderness. There is no right CVA tenderness, left CVA tenderness or guarding.      Comments: Diffuse redness noted again along bilateral lower quadrant, favor reactive rash over cellulitis at this time. Most lateral L port incision, and drain site and and most lateral right incision are open. Neither with surrounding erythema, all with mild serous drainage, no overt purulence noted   Genitourinary:     Comments: Catheter in place draining clear dark yellow urine  Musculoskeletal:         General: Normal range of motion.      Right lower leg: No edema.      Left lower leg: No edema.   Skin:     General: Skin is warm and dry.   Neurological:      General: No focal deficit present.      Mental Status: He is alert and oriented to person, place, and time.   Psychiatric:         Mood and Affect: Mood normal.         Behavior: Behavior normal.     Last Recorded Vitals  Blood pressure 153/69, pulse 86, temperature 36.4 °C (97.5 °F), temperature source Temporal, resp. rate 18, SpO2 95%.  Intake/Output last 3 Shifts:  I/O last 3 completed shifts:  In: 33.8 [I.V.:33.8]  Out: 900 [Urine:900]    Relevant Results  Scheduled medications  acetaminophen, 975 mg,  oral, q6h  aspirin, 81 mg, oral, Daily  carvedilol, 25 mg, oral, BID  cefepime, 1 g, intravenous, q8h  furosemide, 80 mg, oral, Daily  heparin (porcine), 5,000 Units, subcutaneous, q8h  losartan, 25 mg, oral, Daily  pantoprazole, 20 mg, oral, Daily before breakfast  vancomycin, 1,250 mg, intravenous, q12h      Continuous medications  lactated Ringer's, 75 mL/hr, Last Rate: 75 mL/hr (10/19/24 0138)      PRN medications  PRN medications: albuterol, ondansetron **OR** ondansetron, oxyCODONE, oxyCODONE, vancomycin    Results for orders placed or performed during the hospital encounter of 10/19/24 (from the past 24 hours)   CBC   Result Value Ref Range    WBC 19.6 (H) 4.4 - 11.3 x10*3/uL    nRBC 0.0 0.0 - 0.0 /100 WBCs    RBC 4.97 4.50 - 5.90 x10*6/uL    Hemoglobin 15.6 13.5 - 17.5 g/dL    Hematocrit 46.4 41.0 - 52.0 %    MCV 93 80 - 100 fL    MCH 31.4 26.0 - 34.0 pg    MCHC 33.6 32.0 - 36.0 g/dL    RDW 12.2 11.5 - 14.5 %    Platelets 225 150 - 450 x10*3/uL   Basic metabolic panel   Result Value Ref Range    Glucose 107 (H) 74 - 99 mg/dL    Sodium 135 (L) 136 - 145 mmol/L    Potassium 3.8 3.5 - 5.3 mmol/L    Chloride 96 (L) 98 - 107 mmol/L    Bicarbonate 28 21 - 32 mmol/L    Anion Gap 15 10 - 20 mmol/L    Urea Nitrogen 26 (H) 6 - 23 mg/dL    Creatinine 0.96 0.50 - 1.30 mg/dL    eGFR 89 >60 mL/min/1.73m*2    Calcium 8.8 8.6 - 10.6 mg/dL   CBC   Result Value Ref Range    WBC 19.5 (H) 4.4 - 11.3 x10*3/uL    nRBC 0.0 0.0 - 0.0 /100 WBCs    RBC 4.94 4.50 - 5.90 x10*6/uL    Hemoglobin 15.8 13.5 - 17.5 g/dL    Hematocrit 48.3 41.0 - 52.0 %    MCV 98 80 - 100 fL    MCH 32.0 26.0 - 34.0 pg    MCHC 32.7 32.0 - 36.0 g/dL    RDW 12.6 11.5 - 14.5 %    Platelets 214 150 - 450 x10*3/uL   Basic metabolic panel   Result Value Ref Range    Glucose 70 (L) 74 - 99 mg/dL    Sodium 137 136 - 145 mmol/L    Potassium 3.7 3.5 - 5.3 mmol/L    Chloride 95 (L) 98 - 107 mmol/L    Bicarbonate 28 21 - 32 mmol/L    Anion Gap 18 10 - 20 mmol/L    Urea  Nitrogen 26 (H) 6 - 23 mg/dL    Creatinine 1.07 0.50 - 1.30 mg/dL    eGFR 78 >60 mL/min/1.73m*2    Calcium 9.1 8.6 - 10.6 mg/dL   Magnesium   Result Value Ref Range    Magnesium 1.89 1.60 - 2.40 mg/dL       CT abdomen pelvis w IV contrast    Result Date: 10/18/2024  Interpreted By:  Wilner Pérez, STUDY: CT ABDOMEN PELVIS W IV CONTRAST;  10/18/2024 4:35 pm   INDICATION: Signs/Symptoms:fever, chills, abd pain, recent prostatectomy on 10/14.     COMPARISON: June 2, 2016 chest CT, December 20, 2023 MRI prostate, February 12, 2024 PET-CT   ACCESSION NUMBER(S): [id number]   ORDERING CLINICIAN: SANTIAGO FELDER   TECHNIQUE: CT of the abdomen and pelvis was performed.  Standard contiguous axial images were obtained at 3 mm slice thickness through the abdomen and pelvis. Coronal and sagittal reconstructions at 3 mm slice thickness were performed.  75 ML of Omnipaque 350 was administered intravenously without immediate complication.   FINDINGS: LOWER CHEST: Newly seen bandlike density left base with elevation of the adjacent hemidiaphragm favoring atelectasis.   ABDOMEN:   LIVER: The liver demonstrates homogeneous attenuation without evidence of focal liver lesions.   BILE DUCTS: Normal caliber.   GALLBLADDER: Status post cholecystectomy.   PANCREAS: The pancreas appears unremarkable without evidence of ductal dilatation or masses.   SPLEEN: The spleen is normal in size without focal lesions.   ADRENAL GLANDS: Within normal limits.   KIDNEYS AND URETERS: The kidneys are normal in size and enhance symmetrically.  No hydroureteronephrosis or nephroureterolithiasis is identified.   PELVIS:   BLADDER: Decompressed by Madrigal catheter. Mural prominence likely related to underdistention. Trace gas within the lumen.   REPRODUCTIVE ORGANS: Status post prostatectomy with trace gas and minimal indistinctness of the fat at the operative fossa interposed between the urinary bladder and rectum sagittal image 5/83, expected postoperative  appearance.   BOWEL: Moderate fluid within mildly prominent caliber proximal small bowel favoring postoperative ileus. No pneumatosis. The bowel demonstrates grossly uniform enhancement. Normal caliber appendix.   VESSELS: Scattered atherosclerosis. The principal central vessels are patent.   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 The right-sided collection 3.1 x 2.3 x 3.4 cm axial image 2/122 corresponding to estimated volume of 13 mL and the left-sided collection measures 2.9 x 2.0 x 2.8 cm image 2/123 corresponding to estimated volume of 8 mL. There are scattered gas bubbles within the peritoneal cavity compatible with tremor related gas.   BONES AND ABDOMINAL WALL: No suspicious osseous lesions are identified. Degenerative discogenic disease is noted in the lower thoracic and lumbar spine.  There is body wall edema most pronounced pelvic region. Minimal chamber related gas within the ventral body wall.   Similar prominent inguinal lymph nodes which may be reactive although nonspecific. Findings include 18 mm short axis right inguinal node image 2/157.   Similar small fat containing inguinal hernias containing extraperitoneal fat. There is minimal newly seen gas along the right medial spermatic cord image 2/159 likely related to recent surgery.       1.  Status post recent prostatectomy with treatment related scattered gas and minimal fluid within the peritoneal cavity. 2. Bilateral retroperitoneal simple fluid collections and gas adjacent to the medial aspect of the external iliac vessels estimated at 13 mL on the right and 8 mL on the left. 3. Body wall edema and trace gas. 4. Probable postoperative ileus and likely postoperative subsegmental atelectasis left base. 5. Similar bilateral extraperitoneal small indirect fat containing inguinal hernias. 6.  Similar prominent inguinal lymph nodes which may be reactive although nonspecific. Attention recommended at follow-up. 7. Newly seen minimal gas along the right sporadic cord likely related to recent surgery although attention to this region recommended on clinical assessment.     MACRO: None   Signed by: Wilner Pérez 10/18/2024 5:33 PM Dictation workstation:   [id number]                This patient has a urinary catheter   Reason for the urinary catheter remaining today? 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 Steele on 10/14. Patient presented to [place] ED 10/18 with subjective fever/chills, myalgia, and fatigue. Workup there showed leucocytosis. UA concerning for UTI, and CT with non-specific findings of bilateral fluid collections (favored to be hemostatic agent placed in LND bed during recent procedure). Patient is transferred to [facility] for urologic evaluation.     Vitals: AFVSS  on arrival. Febrile to 101.8 at OSH  [facility] labs:  Cr: 095 (~base)  WBC: 13.1  Hgb: 15.6  UA: >500 LE, - nit, + ketones, 21-50 WBC, >20 RBC  Ucx: pending  Bcx: pending        Neuro:   -Multimodal pain control via scheduled tylenol, PRN oxycodone/dilaudid.      CV:  -Monitor vitals  - CBC now and in AM  -Continue home ASA 81, carvedilol, losartan     Pulm:  -IS  -Wean O2  -Continue home albuterol     GI:  - regular diet, bowel regimen, PRN ondansetron       :  -Monitor I/O  -Continue LR @ 75 ml/hr  -BMP now and in AM  -Continue home lasix  -Maintain catheter        ID:   -fu Ucx, Bcx  -Broad spectrum coverage with vanc/cefepime for now  -follow up c. diff     Prophy:  - SQH  - Pantoprazole     Dispo:  - RNF        Erika Armas MD  [id number]  Urology  [id number]

## 2024-10-19 NOTE — CONSULTS
Vancomycin Dosing by Pharmacy- INITIAL    Domingo Greer is a 63 y.o. year old male who Pharmacy has been consulted for vancomycin dosing for cellulitis, skin and soft tissue. Based on the patient's indication and renal status this patient will be dosed based on a goal AUC of 400-600.     Renal function is currently stable.    Visit Vitals  /67 (BP Location: Left arm, Patient Position: Sitting)   Pulse 93   Temp 37.8 °C (100 °F) (Temporal)   Resp 18        Lab Results   Component Value Date    CREATININE 0.95 10/18/2024    CREATININE 1.00 10/15/2024    CREATININE 1.05 2024    CREATININE 1.17 2024        Patient weight is as follows: There were no vitals filed for this visit.    Cultures:  No results found for the encounter in last 14 days.        No intake/output data recorded.  I/O during current shift:  No intake/output data recorded.    Temp (24hrs), Av.9 °C (100.2 °F), Min:37.3 °C (99.1 °F), Max:38.8 °C (101.8 °F)         Assessment/Plan     Patient will not be given a loading dose.  Will initiate vancomycin maintenance,  1250 mg every 12 hours.    This dosing regimen is predicted by [employer] to result in the following pharmacokinetic parameters:  Loading dose: N/A  Regimen: 1250 mg IV every 12 hours.  Start time: 01:08 on 10/19/2024  Exposure target: AUC24 (range)400-600 mg/L.hr   [id number]: 385 mg/L.hr  AUC24,ss: 486 mg/L.hr  Probability of AUC24 > 400: 71 %  Ctrough,ss: 15.8 mg/L  Probability of Ctrough,ss > 20: 28 %      Follow-up level will be ordered on 10/20 with AM labs, unless clinically indicated sooner.  Will continue to monitor renal function daily while on vancomycin and order serum creatinine at least every 48 hours if not already ordered.  Follow for continued vancomycin needs, clinical response, and signs/symptoms of toxicity.       Ashley Scott, PharmD

## 2024-10-19 NOTE — CARE PLAN
Problem: Pain  Goal: Takes deep breaths with improved pain control throughout the shift  Outcome: Progressing  Goal: Turns in bed with improved pain control throughout the shift  Outcome: Progressing  Goal: Walks with improved pain control throughout the shift  Outcome: Progressing  Goal: Performs ADL's with improved pain control throughout shift  Outcome: Progressing  Goal: Participates in PT with improved pain control throughout the shift  Outcome: Progressing  Goal: Free from opioid side effects throughout the shift  Outcome: Progressing  Goal: Free from acute confusion related to pain meds throughout the shift  Outcome: Progressing     Problem: Pain - Adult  Goal: Verbalizes/displays adequate comfort level or baseline comfort level  Outcome: Progressing     Problem: Safety - Adult  Goal: Free from fall injury  Outcome: Progressing     Problem: Discharge Planning  Goal: Discharge to home or other facility with appropriate resources  Outcome: Progressing     Problem: Chronic Conditions and Co-morbidities  Goal: Patient's chronic conditions and co-morbidity symptoms are monitored and maintained or improved  Outcome: Progressing     Problem: Fall/Injury  Goal: Not fall by end of shift  Outcome: Progressing  Goal: Be free from injury by end of the shift  Outcome: Progressing  Goal: Verbalize understanding of personal risk factors for fall in the hospital  Outcome: Progressing  Goal: Verbalize understanding of risk factor reduction measures to prevent injury from fall in the home  Outcome: Progressing  Goal: Use assistive devices by end of the shift  Outcome: Progressing  Goal: Pace activities to prevent fatigue by end of the shift  Outcome: Progressing     Problem: Skin  Goal: Decreased wound size/increased tissue granulation at next dressing change  Outcome: Progressing  Goal: Participates in plan/prevention/treatment measures  Outcome: Progressing  Goal: Prevent/manage excess moisture  Outcome: Progressing  Goal:  Prevent/minimize sheer/friction injuries  Outcome: Progressing  Goal: Promote/optimize nutrition  Outcome: Progressing  Goal: Promote skin healing  Outcome: Progressing   The patient's goals for the shift include Pain management    The clinical goals for the shift include Remain hemodynamically stable throughout shift    Over the shift, the patient did not make progress toward the following goals. Barriers to progression include . Recommendations to address these barriers include .

## 2024-10-19 NOTE — H&P
History Of Present Illness  Domingo Greer is a 63 year old male with a significant pmh of HTN, HLD, afib s/p ablation 10/2022, remote provoked DVT, asthma, CLIVE (on CPAP), and GG3 PCa now s/p RALP with BPLND with Dr. Steele on 10/14. Patient presented to [facility] ED 10/18 with subjective fever/chills, myalgia, and fatigue. Workup there showed leucocytosis. UA concerning for UTI, and CT with non-specific findings of bilateral fluid collections (favored to be hemostatic agent placed in LND bed during recent procedure). Patient is transferred to [facility] for urologic evaluation.    Patient endorses fever and chills, and malaise starting 10/18 and new onset bilateral abdominal LQ pain starting the evening before. Also endorses diarrhea since at ED which he attributes to the antibiotics he was given (has this reaction every time he takes antibiotics)  Patient denies n/v, chest pain, SOA      Past Medical History  He has a past medical history of Arrhythmia, Asthma, BPH (benign prostatic hyperplasia), Cellulitis, unspecified, Dilated cardiomyopathy (Multi), DVT (deep venous thrombosis) (Multi), Hyperlipidemia, Hypertension, Low testosterone in male, Peripheral vascular disease (CMS-HCC), Prostate cancer (Multi), Seizure (Multi), Sleep apnea, Venous insufficiency of both lower extremities, and Venous ulcer (Multi).    Surgical History  He has a past surgical history that includes Rotator cuff repair (Bilateral, 11/13/2013); Cholecystectomy (11/13/2013); Ankle surgery (Right, 11/13/2013); Other surgical history (09/23/2022); Cardiac electrophysiology mapping and ablation; Colonoscopy; Cardiac catheterization; [place] tooth extraction; Prostate biopsy (01/2024); and Prostatectomy (N/A, 10/14/2024).     Social History  He reports that he has quit smoking. His smoking use included cigarettes. He has never used smokeless tobacco. He reports current alcohol use of about 2.0 standard drinks of alcohol per week. He reports that he  does not use drugs.    Family History  Family History   Problem Relation Name Age of Onset    Hypertension Mother      Prostate cancer Father          Allergies  Other, Penicillin, and Bee venom protein (honey bee)    Review of Systems  Negative other than as noted above     Physical Exam  Constitutional:       General: He is not in acute distress.     Appearance: Normal appearance. He is not ill-appearing or toxic-appearing.   HENT:      Head: Normocephalic and atraumatic.   Eyes:      Extraocular Movements: Extraocular movements intact.   Cardiovascular:      Rate and Rhythm: Normal rate.   Pulmonary:      Effort: Pulmonary effort is normal. No respiratory distress.   Abdominal:      General: There is no distension.      Palpations: Abdomen is soft.      Tenderness: There is no abdominal tenderness. There is no right CVA tenderness, left CVA tenderness or guarding.      Comments: Diffuse redness noted along bilateral lower quadrant, favor reactive rash over cellulitis at this time. Most lateral L port incision, and drain site and and most lateral right incision are open. Neither with surrounding erythema, all with mild serous drainage, no overt purulence noted   Genitourinary:     Comments: Catheter in place draining clear dark yellow urine  Musculoskeletal:         General: Normal range of motion.      Right lower leg: No edema.      Left lower leg: No edema.   Skin:     General: Skin is warm and dry.   Neurological:      General: No focal deficit present.      Mental Status: He is alert and oriented to person, place, and time.   Psychiatric:         Mood and Affect: Mood normal.         Behavior: Behavior normal.            Last Recorded Vitals      10/18/2024     3:54 PM 10/18/2024     6:24 PM 10/18/2024     6:52 PM 10/18/2024     8:17 PM 10/18/2024     9:08 PM 10/18/2024    11:00 PM 10/19/2024    12:20 AM   Vitals   Systolic 128  127 115 137 143 180   Diastolic 70  78 70 79 78 67   Heart Rate 89  90 89 88 93  93   Temp  38.8 °C (101.8 °F)  37.3 °C (99.1 °F)   37.8 °C (100 °F)   Resp 20  20 18 19 18 18         Relevant Results    Results for orders placed or performed during the hospital encounter of 10/18/24 (from the past 24 hours)   CBC and Auto Differential   Result Value Ref Range    WBC 13.1 (H) 4.4 - 11.3 x10*3/uL    nRBC 0.0 0.0 - 0.0 /100 WBCs    RBC 4.90 4.50 - 5.90 x10*6/uL    Hemoglobin 15.6 13.5 - 17.5 g/dL    Hematocrit 46.0 41.0 - 52.0 %    MCV 94 80 - 100 fL    MCH 31.8 26.0 - 34.0 pg    MCHC 33.9 32.0 - 36.0 g/dL    RDW 12.2 11.5 - 14.5 %    Platelets 214 150 - 450 x10*3/uL    Neutrophils % 91.1 40.0 - 80.0 %    Immature Granulocytes %, Automated 0.5 0.0 - 0.9 %    Lymphocytes % 3.0 13.0 - 44.0 %    Monocytes % 3.4 2.0 - 10.0 %    Eosinophils % 1.8 0.0 - 6.0 %    Basophils % 0.2 0.0 - 2.0 %    Neutrophils Absolute 11.87 (H) 1.20 - 7.70 x10*3/uL    Immature Granulocytes Absolute, Automated 0.07 0.00 - 0.70 x10*3/uL    Lymphocytes Absolute 0.39 (L) 1.20 - 4.80 x10*3/uL    Monocytes Absolute 0.45 0.10 - 1.00 x10*3/uL    Eosinophils Absolute 0.24 0.00 - 0.70 x10*3/uL    Basophils Absolute 0.03 0.00 - 0.10 x10*3/uL   Comprehensive metabolic panel   Result Value Ref Range    Glucose 109 (H) 74 - 99 mg/dL    Sodium 136 136 - 145 mmol/L    Potassium 3.9 3.5 - 5.3 mmol/L    Chloride 99 98 - 107 mmol/L    Bicarbonate 30 21 - 32 mmol/L    Anion Gap 11 10 - 20 mmol/L    Urea Nitrogen 25 (H) 6 - 23 mg/dL    Creatinine 0.95 0.50 - 1.30 mg/dL    eGFR 90 >60 mL/min/1.73m*2    Calcium 8.9 8.6 - 10.3 mg/dL    Albumin 4.1 3.4 - 5.0 g/dL    Alkaline Phosphatase 45 33 - 136 U/L    Total Protein 6.7 6.4 - 8.2 g/dL    AST 30 9 - 39 U/L    Bilirubin, Total 1.3 (H) 0.0 - 1.2 mg/dL    ALT 37 10 - 52 U/L   Urinalysis with Reflex Culture and Microscopic   Result Value Ref Range    Color, Urine Yellow Light-Yellow, Yellow, Dark-Yellow    Appearance, Urine Turbid (N) Clear    Specific Gravity, Urine 1.028 1.005 - 1.035    pH, Urine  6.0 5.0, 5.5, 6.0, 6.5, 7.0, 7.5, 8.0    Protein, Urine 70 (1+) (A) NEGATIVE, 10 (TRACE), 20 (TRACE) mg/dL    Glucose, Urine Normal Normal mg/dL    Blood, Urine OVER (3+) (A) NEGATIVE    Ketones, Urine 10 (1+) (A) NEGATIVE mg/dL    Bilirubin, Urine NEGATIVE NEGATIVE    Urobilinogen, Urine Normal Normal mg/dL    Nitrite, Urine NEGATIVE NEGATIVE    Leukocyte Esterase, Urine 500 Mervat/µL (A) NEGATIVE   Microscopic Only, Urine   Result Value Ref Range    WBC, Urine 21-50 (A) 1-5, NONE /HPF    RBC, Urine >20 (A) NONE, 1-2, 3-5 /HPF    Mucus, Urine FEW Reference range not established. /LPF       Current Facility-Administered Medications:     acetaminophen (Tylenol) tablet 975 mg, 975 mg, oral, q6h, Erika Armas MD    albuterol 90 mcg/actuation inhaler 2 puff, 2 puff, inhalation, q4h PRN, Erika Armas MD    aspirin EC tablet 81 mg, 81 mg, oral, Daily, Erika Armas MD    carvedilol (Coreg) tablet 25 mg, 25 mg, oral, BID, Erika Armas MD    cefepime (Maxipime) 1 g in dextrose (iso) IV 50 mL, 1 g, intravenous, q8h, Erika Armas MD    furosemide (Lasix) tablet 80 mg, 80 mg, oral, Daily, Erika Armas MD    heparin (porcine) injection 5,000 Units, 5,000 Units, subcutaneous, q8h, Erika Armas MD    lactated Ringer's infusion, 75 mL/hr, intravenous, Continuous, Erika Armas MD    losartan (Cozaar) tablet 25 mg, 25 mg, oral, Daily, Erika Armas MD    ondansetron (Zofran) tablet 4 mg, 4 mg, oral, q8h PRN **OR** ondansetron (Zofran) injection 4 mg, 4 mg, intravenous, q8h PRN, Erika Armas MD    oxyCODONE (Roxicodone) immediate release tablet 2.5 mg, 2.5 mg, oral, q6h PRN, Eirka Armas MD    oxyCODONE (Roxicodone) immediate release tablet 5 mg, 5 mg, oral, q6h PRN, Erika Armas MD    pantoprazole (ProtoNix) EC tablet 20 mg, 20 mg, oral, Daily before breakfast, Erika Armas MD    polyethylene glycol (Glycolax, Miralax) packet 17 g, 17 g, oral, Daily, Erika Armas MD    sennosides-docusate sodium (Sonali-Colace) 8.6-50 mg per tablet 2  tablet, 2 tablet, oral, BID, Erika Armas MD    vancomycin (Vancocin) pharmacy to dose - pharmacy monitoring, , miscellaneous, Daily PRN, Erika Armas MD    CT abdomen pelvis w IV contrast    Result Date: 10/18/2024  Interpreted By:  Wilner Pérez, STUDY: CT ABDOMEN PELVIS W IV CONTRAST;  10/18/2024 4:35 pm   INDICATION: Signs/Symptoms:fever, chills, abd pain, recent prostatectomy on 10/14.     COMPARISON: June 2, 2016 chest CT, December 20, 2023 MRI prostate, February 12, 2024 PET-CT   ACCESSION NUMBER(S): [id number]   ORDERING CLINICIAN: SANTIAGO FELDER   TECHNIQUE: CT of the abdomen and pelvis was performed.  Standard contiguous axial images were obtained at 3 mm slice thickness through the abdomen and pelvis. Coronal and sagittal reconstructions at 3 mm slice thickness were performed.  75 ML of Omnipaque 350 was administered intravenously without immediate complication.   FINDINGS: LOWER CHEST: Newly seen bandlike density left base with elevation of the adjacent hemidiaphragm favoring atelectasis.   ABDOMEN:   LIVER: The liver demonstrates homogeneous attenuation without evidence of focal liver lesions.   BILE DUCTS: Normal caliber.   GALLBLADDER: Status post cholecystectomy.   PANCREAS: The pancreas appears unremarkable without evidence of ductal dilatation or masses.   SPLEEN: The spleen is normal in size without focal lesions.   ADRENAL GLANDS: Within normal limits.   KIDNEYS AND URETERS: The kidneys are normal in size and enhance symmetrically.  No hydroureteronephrosis or nephroureterolithiasis is identified.   PELVIS:   BLADDER: Decompressed by Madrigal catheter. Mural prominence likely related to underdistention. Trace gas within the lumen.   REPRODUCTIVE ORGANS: Status post prostatectomy with trace gas and minimal indistinctness of the fat at the operative fossa interposed between the urinary bladder and rectum sagittal image 5/83, expected postoperative appearance.   BOWEL: Moderate fluid within mildly  prominent caliber proximal small bowel favoring postoperative ileus. No pneumatosis. The bowel demonstrates grossly uniform enhancement. Normal caliber appendix.   VESSELS: Scattered atherosclerosis. The principal central vessels are patent.   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 The right-sided collection 3.1 x 2.3 x 3.4 cm axial image 2/122 corresponding to estimated volume of 13 mL and the left-sided collection measures 2.9 x 2.0 x 2.8 cm image 2/123 corresponding to estimated volume of 8 mL. There are scattered gas bubbles within the peritoneal cavity compatible with tremor related gas.   BONES AND ABDOMINAL WALL: No suspicious osseous lesions are identified. Degenerative discogenic disease is noted in the lower thoracic and lumbar spine.  There is body wall edema most pronounced pelvic region. Minimal chamber related gas within the ventral body wall.   Similar prominent inguinal lymph nodes which may be reactive although nonspecific. Findings include 18 mm short axis right inguinal node image 2/157.   Similar small fat containing inguinal hernias containing extraperitoneal fat. There is minimal newly seen gas along the right medial spermatic cord image 2/159 likely related to recent surgery.       1.  Status post recent prostatectomy with treatment related scattered gas and minimal fluid within the peritoneal cavity. 2. Bilateral retroperitoneal simple fluid collections and gas adjacent to the medial aspect of the external iliac vessels estimated at 13 mL on the right and 8 mL on the left. 3. Body wall edema and trace gas. 4. Probable postoperative ileus and likely postoperative subsegmental atelectasis left base. 5. Similar bilateral extraperitoneal small indirect fat containing inguinal hernias. 6. Similar prominent inguinal lymph nodes which may be  reactive although nonspecific. Attention recommended at follow-up. 7. Newly seen minimal gas along the right sporadic cord likely related to recent surgery although attention to this region recommended on clinical assessment.     MACRO: None   Signed by: Wilner Pérez 10/18/2024 5:33 PM Dictation workstation:   [id number]      Assessment/Plan   Assessment & Plan  Prostate cancer (Multi)      Domingo Greer is a 63 year old male with a significant pmh of HTN, HLD, afib s/p ablation 10/2022, remote provoked DVT, asthma, CLIVE (on CPAP), and GG3 PCa now s/p RALP with BPLND with Dr. Steele on 10/14. Patient presented to [facility] ED 10/18 with subjective fever/chills, myalgia, and fatigue. Workup there showed leucocytosis. UA concerning for UTI, and CT with non-specific findings of bilateral fluid collections (favored to be hemostatic agent placed in LND bed during recent procedure). Patient is transferred to [facility] for urologic evaluation.    Vitals: AFVSS  on arrival. Febrile to 101.8 at OSH  [place] labs:  Cr: 095 (~base)  WBC: 13.1  Hgb: 15.6  UA: >500 LE, - nit, + ketones, 21-50 WBC, >20 RBC  Ucx: pending  Bcx: pending      Neuro:   -Multimodal pain control via scheduled tylenol, PRN oxycodone/dilaudid.     CV:  -Monitor vitals  - CBC now and in AM  -Continue home ASA 81, carvedilol, losartan    Pulm:  -IS  -Wean O2  -Continue home albuterol    GI:  - regular diet, bowel regimen, PRN ondansetron    :  -Monitor I/O  - LR @ 75 ml/hr  -BMP now and in AM  -Continue home lasix  -Maintain catheter      ID:   -fu Ucx, Bcx  -Broad spectrum coverage with vanc/cefepime for now    Prophy:  - SQH  - Pantoprazole    Dispo:  - BETH Armas MD

## 2024-10-19 NOTE — PROGRESS NOTES
Pharmacy Medication History Review    Domingo Greer is a 63 y.o. male admitted for Prostate cancer (Multi). Pharmacy reviewed the patient's [id number] medications for accuracy.    Medications ADDED:  none  Medications CHANGED:  none  Medications REMOVED:   none     The list below reflects the updated PTA list.   Prior to Admission Medications   Prescriptions Last Dose Informant   acetaminophen (Tylenol) 500 mg tablet Not Taking Self   Sig: Take 2 tablets (1,000 mg) by mouth every 6 hours if needed for mild pain (1 - 3).   Patient not taking: Reported on 10/19/2024   albuterol 90 mcg/actuation inhaler  Self   Sig: Inhale 1 puff every 6 hours if needed for wheezing or shortness of breath.   aspirin 81 mg EC tablet  Self   Sig: Take 1 tablet (81 mg) by mouth once daily.   carvedilol (Coreg) 25 mg tablet  Self   Sig: Take 1 tablet (25 mg) by mouth 2 times daily (morning and late afternoon).   esomeprazole (NexIUM) 20 mg DR capsule  Self   Sig: Take 1 capsule (20 mg) by mouth once daily in the morning. Take before meals. Do not open capsule.   furosemide (Lasix) 80 mg tablet  Self   Sig: Take 1 tablet (80 mg) by mouth once daily.   ketorolac (Toradol) 10 mg tablet  Self   Sig: Take 1 tablet (10 mg) by mouth every 6 hours if needed for moderate pain (4 - 6).   losartan (Cozaar) 25 mg tablet  Self   Sig: Take 1 tablet (25 mg) by mouth once daily.   sennosides (Senokot) 8.6 mg tablet Not Taking Self   Sig: Take 1 tablet (8.6 mg) by mouth once daily.   Patient not taking: Reported on 10/19/2024      Facility-Administered Medications: None        Patient declines M2B at discharge.     Sources:   -patient interview (reliable historian)  -outpatient pharmacy dispense history  -Care Everywhere medication lists  -OARRS  -cardiology office visit 7/10/24        Additional Comments:  Patient usually takes OTC ibuprofen as needed at home.  Advised not to take while still using ketorolac      Constantino Morrison  "PharmD  Transitions of Care Pharmacist  10/19/24     Secure Chat preferred   If no response call [phone] or Vocera \"Med Rec\"    "

## 2024-10-20 LAB
ANION GAP SERPL CALC-SCNC: 10 MMOL/L (ref 10–20)
APTT PPP: 29 SECONDS (ref 27–38)
BACTERIA BLD CULT: NORMAL
BACTERIA BLD CULT: NORMAL
BACTERIA UR CULT: NO GROWTH
BUN SERPL-MCNC: 25 MG/DL (ref 6–23)
C DIF TOX TCDA+TCDB STL QL NAA+PROBE: NOT DETECTED
CALCIUM SERPL-MCNC: 8.4 MG/DL (ref 8.6–10.6)
CHLORIDE SERPL-SCNC: 99 MMOL/L (ref 98–107)
CO2 SERPL-SCNC: 31 MMOL/L (ref 21–32)
CREAT SERPL-MCNC: 1.19 MG/DL (ref 0.5–1.3)
EGFRCR SERPLBLD CKD-EPI 2021: 69 ML/MIN/1.73M*2
ERYTHROCYTE [DISTWIDTH] IN BLOOD BY AUTOMATED COUNT: 12.5 % (ref 11.5–14.5)
GLUCOSE SERPL-MCNC: 89 MG/DL (ref 74–99)
HCT VFR BLD AUTO: 38.4 % (ref 41–52)
HGB BLD-MCNC: 12.6 G/DL (ref 13.5–17.5)
INR PPP: 1.4 (ref 0.9–1.1)
LACTATE SERPL-SCNC: 0.7 MMOL/L (ref 0.4–2)
MCH RBC QN AUTO: 31.7 PG (ref 26–34)
MCHC RBC AUTO-ENTMCNC: 32.8 G/DL (ref 32–36)
MCV RBC AUTO: 97 FL (ref 80–100)
NRBC BLD-RTO: 0 /100 WBCS (ref 0–0)
PLATELET # BLD AUTO: 174 X10*3/UL (ref 150–450)
POTASSIUM SERPL-SCNC: 3.5 MMOL/L (ref 3.5–5.3)
PROTHROMBIN TIME: 15.7 SECONDS (ref 9.8–12.8)
RBC # BLD AUTO: 3.98 X10*6/UL (ref 4.5–5.9)
SODIUM SERPL-SCNC: 136 MMOL/L (ref 136–145)
VANCOMYCIN SERPL-MCNC: 12.6 UG/ML (ref 5–20)
WBC # BLD AUTO: 12.2 X10*3/UL (ref 4.4–11.3)

## 2024-10-20 PROCEDURE — 2500000002 HC RX 250 W HCPCS SELF ADMINISTERED DRUGS (ALT 637 FOR MEDICARE OP, ALT 636 FOR OP/ED)

## 2024-10-20 PROCEDURE — 36415 COLL VENOUS BLD VENIPUNCTURE: CPT

## 2024-10-20 PROCEDURE — 2500000004 HC RX 250 GENERAL PHARMACY W/ HCPCS (ALT 636 FOR OP/ED): Mod: JZ | Performed by: STUDENT IN AN ORGANIZED HEALTH CARE EDUCATION/TRAINING PROGRAM

## 2024-10-20 PROCEDURE — 2500000002 HC RX 250 W HCPCS SELF ADMINISTERED DRUGS (ALT 637 FOR MEDICARE OP, ALT 636 FOR OP/ED): Performed by: STUDENT IN AN ORGANIZED HEALTH CARE EDUCATION/TRAINING PROGRAM

## 2024-10-20 PROCEDURE — 87075 CULTR BACTERIA EXCEPT BLOOD: CPT

## 2024-10-20 PROCEDURE — 2500000004 HC RX 250 GENERAL PHARMACY W/ HCPCS (ALT 636 FOR OP/ED)

## 2024-10-20 PROCEDURE — 80048 BASIC METABOLIC PNL TOTAL CA: CPT

## 2024-10-20 PROCEDURE — 1200000003 HC ONCOLOGY  ROOM WITH TELEMETRY DAILY

## 2024-10-20 PROCEDURE — 2500000001 HC RX 250 WO HCPCS SELF ADMINISTERED DRUGS (ALT 637 FOR MEDICARE OP): Performed by: STUDENT IN AN ORGANIZED HEALTH CARE EDUCATION/TRAINING PROGRAM

## 2024-10-20 PROCEDURE — 83605 ASSAY OF LACTIC ACID: CPT | Performed by: STUDENT IN AN ORGANIZED HEALTH CARE EDUCATION/TRAINING PROGRAM

## 2024-10-20 PROCEDURE — 80202 ASSAY OF VANCOMYCIN: CPT | Performed by: STUDENT IN AN ORGANIZED HEALTH CARE EDUCATION/TRAINING PROGRAM

## 2024-10-20 PROCEDURE — 36415 COLL VENOUS BLD VENIPUNCTURE: CPT | Performed by: STUDENT IN AN ORGANIZED HEALTH CARE EDUCATION/TRAINING PROGRAM

## 2024-10-20 PROCEDURE — 2500000001 HC RX 250 WO HCPCS SELF ADMINISTERED DRUGS (ALT 637 FOR MEDICARE OP)

## 2024-10-20 PROCEDURE — 85730 THROMBOPLASTIN TIME PARTIAL: CPT | Performed by: STUDENT IN AN ORGANIZED HEALTH CARE EDUCATION/TRAINING PROGRAM

## 2024-10-20 PROCEDURE — 85027 COMPLETE CBC AUTOMATED: CPT

## 2024-10-20 RX ORDER — POTASSIUM CHLORIDE 20 MEQ/1
40 TABLET, EXTENDED RELEASE ORAL ONCE
Status: COMPLETED | OUTPATIENT
Start: 2024-10-20 | End: 2024-10-20

## 2024-10-20 RX ORDER — POTASSIUM CHLORIDE 20 MEQ/1
20 TABLET, EXTENDED RELEASE ORAL ONCE
Status: COMPLETED | OUTPATIENT
Start: 2024-10-20 | End: 2024-10-20

## 2024-10-20 RX ORDER — FUROSEMIDE 20 MG/1
10 TABLET ORAL ONCE
Status: COMPLETED | OUTPATIENT
Start: 2024-10-20 | End: 2024-10-20

## 2024-10-20 RX ORDER — SODIUM CHLORIDE, SODIUM LACTATE, POTASSIUM CHLORIDE, CALCIUM CHLORIDE 600; 310; 30; 20 MG/100ML; MG/100ML; MG/100ML; MG/100ML
75 INJECTION, SOLUTION INTRAVENOUS CONTINUOUS
Status: DISCONTINUED | OUTPATIENT
Start: 2024-10-20 | End: 2024-10-20

## 2024-10-20 RX ORDER — VANCOMYCIN HYDROCHLORIDE 125 MG/1
125 CAPSULE ORAL 4 TIMES DAILY
Status: DISCONTINUED | OUTPATIENT
Start: 2024-10-20 | End: 2024-10-22

## 2024-10-20 SDOH — ECONOMIC STABILITY: FOOD INSECURITY: WITHIN THE PAST 12 MONTHS, YOU WORRIED THAT YOUR FOOD WOULD RUN OUT BEFORE YOU GOT THE MONEY TO BUY MORE.: NEVER TRUE

## 2024-10-20 SDOH — SOCIAL STABILITY: SOCIAL INSECURITY: WITHIN THE LAST YEAR, HAVE YOU BEEN AFRAID OF YOUR PARTNER OR EX-PARTNER?: NO

## 2024-10-20 SDOH — ECONOMIC STABILITY: HOUSING INSECURITY: IN THE LAST 12 MONTHS, WAS THERE A TIME WHEN YOU WERE NOT ABLE TO PAY THE MORTGAGE OR RENT ON TIME?: NO

## 2024-10-20 SDOH — ECONOMIC STABILITY: FOOD INSECURITY: WITHIN THE PAST 12 MONTHS, THE FOOD YOU BOUGHT JUST DIDN'T LAST AND YOU DIDN'T HAVE MONEY TO GET MORE.: NEVER TRUE

## 2024-10-20 SDOH — SOCIAL STABILITY: SOCIAL INSECURITY: WITHIN THE LAST YEAR, HAVE YOU BEEN HUMILIATED OR EMOTIONALLY ABUSED IN OTHER WAYS BY YOUR PARTNER OR EX-PARTNER?: NO

## 2024-10-20 SDOH — ECONOMIC STABILITY: INCOME INSECURITY: IN THE PAST 12 MONTHS HAS THE ELECTRIC, GAS, OIL, OR WATER COMPANY THREATENED TO SHUT OFF SERVICES IN YOUR HOME?: NO

## 2024-10-20 SDOH — ECONOMIC STABILITY: HOUSING INSECURITY: AT ANY TIME IN THE PAST 12 MONTHS, WERE YOU HOMELESS OR LIVING IN A SHELTER (INCLUDING NOW)?: NO

## 2024-10-20 SDOH — ECONOMIC STABILITY: FOOD INSECURITY: HOW HARD IS IT FOR YOU TO PAY FOR THE VERY BASICS LIKE FOOD, HOUSING, MEDICAL CARE, AND HEATING?: NOT HARD AT ALL

## 2024-10-20 SDOH — ECONOMIC STABILITY: HOUSING INSECURITY: IN THE PAST 12 MONTHS, HOW MANY TIMES HAVE YOU MOVED WHERE YOU WERE LIVING?: 3

## 2024-10-20 SDOH — ECONOMIC STABILITY: TRANSPORTATION INSECURITY: IN THE PAST 12 MONTHS, HAS LACK OF TRANSPORTATION KEPT YOU FROM MEDICAL APPOINTMENTS OR FROM GETTING MEDICATIONS?: NO

## 2024-10-20 ASSESSMENT — ACTIVITIES OF DAILY LIVING (ADL): LACK_OF_TRANSPORTATION: NO

## 2024-10-20 ASSESSMENT — PAIN - FUNCTIONAL ASSESSMENT
PAIN_FUNCTIONAL_ASSESSMENT: 0-10

## 2024-10-20 ASSESSMENT — PAIN SCALES - GENERAL
PAINLEVEL_OUTOF10: 0 - NO PAIN
PAINLEVEL_OUTOF10: 3
PAINLEVEL_OUTOF10: 8
PAINLEVEL_OUTOF10: 7
PAINLEVEL_OUTOF10: 4
PAINLEVEL_OUTOF10: 7

## 2024-10-20 ASSESSMENT — PAIN DESCRIPTION - DESCRIPTORS: DESCRIPTORS: ACHING

## 2024-10-20 NOTE — CARE PLAN
Problem: Pain  Goal: Takes deep breaths with improved pain control throughout the shift  Outcome: Progressing  Goal: Turns in bed with improved pain control throughout the shift  Outcome: Progressing  Goal: Walks with improved pain control throughout the shift  Outcome: Progressing  Goal: Performs ADL's with improved pain control throughout shift  Outcome: Progressing  Goal: Participates in PT with improved pain control throughout the shift  Outcome: Progressing  Goal: Free from opioid side effects throughout the shift  Outcome: Progressing  Goal: Free from acute confusion related to pain meds throughout the shift  Outcome: Progressing     Problem: Pain - Adult  Goal: Verbalizes/displays adequate comfort level or baseline comfort level  Outcome: Progressing     Problem: Safety - Adult  Goal: Free from fall injury  Outcome: Progressing     Problem: Discharge Planning  Goal: Discharge to home or other facility with appropriate resources  Outcome: Progressing     Problem: Fall/Injury  Goal: Not fall by end of shift  Outcome: Progressing  Goal: Be free from injury by end of the shift  Outcome: Progressing  Goal: Verbalize understanding of personal risk factors for fall in the hospital  Outcome: Progressing  Goal: Verbalize understanding of risk factor reduction measures to prevent injury from fall in the home  Outcome: Progressing  Goal: Use assistive devices by end of the shift  Outcome: Progressing  Goal: Pace activities to prevent fatigue by end of the shift  Outcome: Progressing     Problem: Chronic Conditions and Co-morbidities  Goal: Patient's chronic conditions and co-morbidity symptoms are monitored and maintained or improved  Outcome: Progressing   The patient's goals for the shift include Pain management    The clinical goals for the shift include RR WNL    Over the shift, the patient did not make progress toward the following goals. Barriers to progression include None noted. Recommendations to address  these barriers include Continue POC.

## 2024-10-20 NOTE — PROGRESS NOTES
Vancomycin Dosing by Pharmacy- FOLLOW UP    Domingo Greer is a 63 y.o. year old male who Pharmacy has been consulted for vancomycin dosing for cellulitis, skin and soft tissue. Based on the patient's indication and renal status this patient is being dosed based on a goal AUC of 400-600.     Renal function is currently stable.    Current vancomycin dose: 1250 mg given every 12 hours    Estimated vancomycin AUC on current dose: 447 mg/L.hr     Visit Vitals  /72   Pulse 78   Temp 36.2 °C (97.2 °F) (Temporal)   Resp 16        Lab Results   Component Value Date    CREATININE 1.19 10/20/2024    CREATININE 1.07 10/19/2024    CREATININE 0.96 10/19/2024    CREATININE 0.95 10/18/2024        Patient weight is as follows:   Vitals:    10/20/24 0053   Weight: 120 kg (265 lb)       Cultures:  No results found for the encounter in last 14 days.       I/O last 3 completed shifts:  In: 5495.8 (45.7 mL/kg) [I.V.:2387.5 (19.9 mL/kg); IV Piggyback:3108.3]  Out: 2075 (17.3 mL/kg) [Urine:2075 (0.5 mL/kg/hr)]  Weight: 120.2 kg   I/O during current shift:  I/O this shift:  In: 370 [P.O.:320; IV Piggyback:50]  Out: 325 [Urine:325]    Temp (24hrs), Av.9 °C (98.4 °F), Min:36.2 °C (97.2 °F), Max:38 °C (100.4 °F)      Assessment/Plan    Within goal AUC range. Continue current vancomycin regimen.    This dosing regimen is predicted by [employer] to result in the following pharmacokinetic parameters:  Regimen: 1250 mg IV every 12 hours.  Start time: 13:47 on 10/20/2024  Exposure target: AUC24 (range)400-600 mg/L.hr   [id number]: 447 mg/L.hr  AUC24,ss: 549 mg/L.hr  Probability of AUC24 > 400: 90 %  Ctrough,ss: 20 mg/L  Probability of Ctrough,ss > 20: 50 %      The next level will be obtained on 10/22 at AM labs. May be obtained sooner if clinically indicated.   Will continue to monitor renal function daily while on vancomycin and order serum creatinine at least every 48 hours if not already ordered.  Follow for continued vancomycin needs,  clinical response, and signs/symptoms of toxicity.       Mirna Price, PharmD

## 2024-10-20 NOTE — PROGRESS NOTES
Vancomycin Dosing by Pharmacy- Cessation of Therapy    Consult to pharmacy for vancomycin dosing has been discontinued by the prescriber, pharmacy will sign off at this time.    Please call pharmacy if there are further questions or re-enter a consult if vancomycin is resumed.     Margie Urrutia, MassielD

## 2024-10-20 NOTE — SIGNIFICANT EVENT
10/19/24 2215   Vital Signs   Temp 36.6 °C (97.9 °F)   Temp Source Temporal   Heart Rate 83   Heart Rate Source Monitor   Resp 22   /71   MAP (mmHg) 88   BP Location Left arm   BP Method Automatic   Patient Position Sitting   RASS Score   Jacob Agitation Sedation Scale (RASS) 0   Medical Gas Therapy   SpO2 93 %   Medical Gas Therapy None (Room air)   Pain Assessment   Pain Assessment 0-10   0-10 (Numeric) Pain Score 2

## 2024-10-21 LAB
ANION GAP SERPL CALC-SCNC: 12 MMOL/L (ref 10–20)
BUN SERPL-MCNC: 21 MG/DL (ref 6–23)
CALCIUM SERPL-MCNC: 7.9 MG/DL (ref 8.6–10.6)
CHLORIDE SERPL-SCNC: 101 MMOL/L (ref 98–107)
CO2 SERPL-SCNC: 26 MMOL/L (ref 21–32)
CREAT SERPL-MCNC: 0.9 MG/DL (ref 0.5–1.3)
EGFRCR SERPLBLD CKD-EPI 2021: >90 ML/MIN/1.73M*2
ERYTHROCYTE [DISTWIDTH] IN BLOOD BY AUTOMATED COUNT: 12.2 % (ref 11.5–14.5)
GLUCOSE SERPL-MCNC: 104 MG/DL (ref 74–99)
HCT VFR BLD AUTO: 37.6 % (ref 41–52)
HGB BLD-MCNC: 12.7 G/DL (ref 13.5–17.5)
MCH RBC QN AUTO: 31.8 PG (ref 26–34)
MCHC RBC AUTO-ENTMCNC: 33.8 G/DL (ref 32–36)
MCV RBC AUTO: 94 FL (ref 80–100)
NRBC BLD-RTO: 0 /100 WBCS (ref 0–0)
PLATELET # BLD AUTO: 189 X10*3/UL (ref 150–450)
POTASSIUM SERPL-SCNC: 4.1 MMOL/L (ref 3.5–5.3)
RBC # BLD AUTO: 3.99 X10*6/UL (ref 4.5–5.9)
SODIUM SERPL-SCNC: 135 MMOL/L (ref 136–145)
WBC # BLD AUTO: 9.3 X10*3/UL (ref 4.4–11.3)

## 2024-10-21 PROCEDURE — 2500000004 HC RX 250 GENERAL PHARMACY W/ HCPCS (ALT 636 FOR OP/ED): Performed by: STUDENT IN AN ORGANIZED HEALTH CARE EDUCATION/TRAINING PROGRAM

## 2024-10-21 PROCEDURE — 1200000003 HC ONCOLOGY  ROOM WITH TELEMETRY DAILY

## 2024-10-21 PROCEDURE — 36415 COLL VENOUS BLD VENIPUNCTURE: CPT

## 2024-10-21 PROCEDURE — 2500000001 HC RX 250 WO HCPCS SELF ADMINISTERED DRUGS (ALT 637 FOR MEDICARE OP)

## 2024-10-21 PROCEDURE — 2500000001 HC RX 250 WO HCPCS SELF ADMINISTERED DRUGS (ALT 637 FOR MEDICARE OP): Performed by: STUDENT IN AN ORGANIZED HEALTH CARE EDUCATION/TRAINING PROGRAM

## 2024-10-21 PROCEDURE — 80048 BASIC METABOLIC PNL TOTAL CA: CPT

## 2024-10-21 PROCEDURE — 2500000004 HC RX 250 GENERAL PHARMACY W/ HCPCS (ALT 636 FOR OP/ED): Mod: JZ

## 2024-10-21 PROCEDURE — 87506 IADNA-DNA/RNA PROBE TQ 6-11: CPT | Performed by: STUDENT IN AN ORGANIZED HEALTH CARE EDUCATION/TRAINING PROGRAM

## 2024-10-21 PROCEDURE — 2500000002 HC RX 250 W HCPCS SELF ADMINISTERED DRUGS (ALT 637 FOR MEDICARE OP, ALT 636 FOR OP/ED): Performed by: STUDENT IN AN ORGANIZED HEALTH CARE EDUCATION/TRAINING PROGRAM

## 2024-10-21 PROCEDURE — 85027 COMPLETE CBC AUTOMATED: CPT

## 2024-10-21 ASSESSMENT — PAIN SCALES - GENERAL
PAINLEVEL_OUTOF10: 4
PAINLEVEL_OUTOF10: 5 - MODERATE PAIN
PAINLEVEL_OUTOF10: 4
PAINLEVEL_OUTOF10: 0 - NO PAIN
PAINLEVEL_OUTOF10: 4
PAINLEVEL_OUTOF10: 0 - NO PAIN
PAINLEVEL_OUTOF10: 2
PAINLEVEL_OUTOF10: 5 - MODERATE PAIN

## 2024-10-21 ASSESSMENT — PAIN - FUNCTIONAL ASSESSMENT
PAIN_FUNCTIONAL_ASSESSMENT: 0-10

## 2024-10-21 ASSESSMENT — PAIN DESCRIPTION - DESCRIPTORS: DESCRIPTORS: ACHING

## 2024-10-21 NOTE — PROGRESS NOTES
Domingo Greer is a 63 y.o. male on day 2 of admission presenting with Prostate cancer (Multi).    Subjective   - No acute events overnight   - Patient denies abdominal pain, fever, chills, nausea, vomiting. He reports subjective improvement in his diarrhea. (1x/hr ; small volume)   - Afebrile, vital signs WNL  - Labs unremarkable with stbale Hgb WBC and Cr  - Natarajan with clear yellow urine - UOP 1.5L  - patient felt worse through the day yesterday but overnight has had some improvement in abdominal pain and energy level  - denies nausea or vomiting, though has decreased appetite  - did not wear CPAP though wears one nightly at home  - C diff negative     Objective     Physical Exam  General: resting comfortably in bed  Neuro: alert and oriented, no obvious focal deficit   Head/Neck: normocephalic, atraumatic  ENT: moist mucous membranes  CV: regular rate and rhythm on telemetry  Pulm: nonlabored breathing on room air, no conversational dyspnea  Abd: soft, mildly distended, tender, port site incisions with some superficial dehiscence and drainage through the wounds, diffuse red rash on bilateral lower quadrants remains stable  : natarajan catheter draining clear yellow urine  MSK: TERRY, no gross deformities  Psych: mood and behavior normal    Last Recorded Vitals  Blood pressure 121/72, pulse 74, temperature 37.3 °C (99.1 °F), temperature source Temporal, resp. rate 18, height 1.829 m (6'), weight 120 kg (265 lb), SpO2 96%.  Intake/Output last 3 Shifts:  I/O last 3 completed shifts:  In: 6457.1 (53.7 mL/kg) [P.O.:795; I.V.:2353.8 (19.6 mL/kg); IV Piggyback:3308.3]  Out: 2550 (21.2 mL/kg) [Urine:2550 (0.6 mL/kg/hr)]  Weight: 120.2 kg     Relevant Results  Scheduled medications  acetaminophen, 975 mg, oral, q6h  aspirin, 81 mg, oral, Daily  carvedilol, 25 mg, oral, BID  furosemide, 80 mg, oral, Daily  heparin (porcine), 5,000 Units, subcutaneous, q8h  losartan, 25 mg, oral, Daily  metroNIDAZOLE, 500 mg, intravenous,  q8h  pantoprazole, 20 mg, oral, Daily before breakfast  vancomycin, 125 mg, oral, 4x daily      Continuous medications       PRN medications  PRN medications: albuterol, ondansetron **OR** ondansetron, oxyCODONE, oxyCODONE    Results for orders placed or performed during the hospital encounter of 10/19/24 (from the past 24 hours)   Lactate   Result Value Ref Range    Lactate 0.7 0.4 - 2.0 mmol/L   Tissue/Wound Culture/Smear    Specimen: Surgical Site Infection; Tissue/Biopsy   Result Value Ref Range    Gram Stain (4+) Abundant Polymorphonuclear leukocytes     Gram Stain No organisms seen    CBC   Result Value Ref Range    WBC 9.3 4.4 - 11.3 x10*3/uL    nRBC 0.0 0.0 - 0.0 /100 WBCs    RBC 3.99 (L) 4.50 - 5.90 x10*6/uL    Hemoglobin 12.7 (L) 13.5 - 17.5 g/dL    Hematocrit 37.6 (L) 41.0 - 52.0 %    MCV 94 80 - 100 fL    MCH 31.8 26.0 - 34.0 pg    MCHC 33.8 32.0 - 36.0 g/dL    RDW 12.2 11.5 - 14.5 %    Platelets 189 150 - 450 x10*3/uL   Basic metabolic panel   Result Value Ref Range    Glucose 104 (H) 74 - 99 mg/dL    Sodium 135 (L) 136 - 145 mmol/L    Potassium 4.1 3.5 - 5.3 mmol/L    Chloride 101 98 - 107 mmol/L    Bicarbonate 26 21 - 32 mmol/L    Anion Gap 12 10 - 20 mmol/L    Urea Nitrogen 21 6 - 23 mg/dL    Creatinine 0.90 0.50 - 1.30 mg/dL    eGFR >90 >60 mL/min/1.73m*2    Calcium 7.9 (L) 8.6 - 10.6 mg/dL       CT abdomen pelvis w IV contrast    Result Date: 10/18/2024  Interpreted By:  Wilner Pérez, STUDY: CT ABDOMEN PELVIS W IV CONTRAST;  10/18/2024 4:35 pm   INDICATION: Signs/Symptoms:fever, chills, abd pain, recent prostatectomy on 10/14.     COMPARISON: June 2, 2016 chest CT, December 20, 2023 MRI prostate, February 12, 2024 PET-CT   ACCESSION NUMBER(S): [id number]   ORDERING CLINICIAN: SANTIAGO FELDER   TECHNIQUE: CT of the abdomen and pelvis was performed.  Standard contiguous axial images were obtained at 3 mm slice thickness through the abdomen and pelvis. Coronal and sagittal reconstructions at 3 mm  slice thickness were performed.  75 ML of Omnipaque 350 was administered intravenously without immediate complication.   FINDINGS: LOWER CHEST: Newly seen bandlike density left base with elevation of the adjacent hemidiaphragm favoring atelectasis.   ABDOMEN:   LIVER: The liver demonstrates homogeneous attenuation without evidence of focal liver lesions.   BILE DUCTS: Normal caliber.   GALLBLADDER: Status post cholecystectomy.   PANCREAS: The pancreas appears unremarkable without evidence of ductal dilatation or masses.   SPLEEN: The spleen is normal in size without focal lesions.   ADRENAL GLANDS: Within normal limits.   KIDNEYS AND URETERS: The kidneys are normal in size and enhance symmetrically.  No hydroureteronephrosis or nephroureterolithiasis is identified.   PELVIS:   BLADDER: Decompressed by Madrigal catheter. Mural prominence likely related to underdistention. Trace gas within the lumen.   REPRODUCTIVE ORGANS: Status post prostatectomy with trace gas and minimal indistinctness of the fat at the operative fossa interposed between the urinary bladder and rectum sagittal image 5/83, expected postoperative appearance.   BOWEL: Moderate fluid within mildly prominent caliber proximal small bowel favoring postoperative ileus. No pneumatosis. The bowel demonstrates grossly uniform enhancement. Normal caliber appendix.   VESSELS: Scattered atherosclerosis. The principal central vessels are patent.   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 The right-sided collection 3.1 x 2.3 x 3.4 cm axial image 2/122 corresponding to estimated volume of 13 mL and the left-sided collection measures 2.9 x 2.0 x 2.8 cm image 2/123 corresponding to estimated volume of 8 mL. There are scattered gas bubbles within the peritoneal cavity compatible with tremor related gas.   BONES AND  ABDOMINAL WALL: No suspicious osseous lesions are identified. Degenerative discogenic disease is noted in the lower thoracic and lumbar spine.  There is body wall edema most pronounced pelvic region. Minimal chamber related gas within the ventral body wall.   Similar prominent inguinal lymph nodes which may be reactive although nonspecific. Findings include 18 mm short axis right inguinal node image 2/157.   Similar small fat containing inguinal hernias containing extraperitoneal fat. There is minimal newly seen gas along the right medial spermatic cord image 2/159 likely related to recent surgery.       1.  Status post recent prostatectomy with treatment related scattered gas and minimal fluid within the peritoneal cavity. 2. Bilateral retroperitoneal simple fluid collections and gas adjacent to the medial aspect of the external iliac vessels estimated at 13 mL on the right and 8 mL on the left. 3. Body wall edema and trace gas. 4. Probable postoperative ileus and likely postoperative subsegmental atelectasis left base. 5. Similar bilateral extraperitoneal small indirect fat containing inguinal hernias. 6. Similar prominent inguinal lymph nodes which may be reactive although nonspecific. Attention recommended at follow-up. 7. Newly seen minimal gas along the right sporadic cord likely related to recent surgery although attention to this region recommended on clinical assessment.     MACRO: None   Signed by: Wilner Pérez 10/18/2024 5:33 PM Dictation workstation:   [id number]                This patient has a urinary catheter   Reason for the urinary catheter remaining today? perioperative use for selected surgical procedures               Assessment/Plan   Assessment & Plan  Prostate cancer (Multi)      Domingo Bonnerkathrine is a 63 year old male with a significant pmh of HTN, HLD, afib s/p ablation 10/2022, remote provoked DVT, asthma, CLIVE (on CPAP), and GG3 PCa now s/p RALP with BPLND with Dr. Steele on 10/14.  Patient presented to [place] ED 10/18 with subjective fever/chills, myalgia, and fatigue. Workup there showed leucocytosis. UA concerning for UTI, and CT with non-specific findings of bilateral fluid collections (favored to be hemostatic agent placed in LND bed during recent procedure). Patient was transferred to [facility] for urologic evaluation. Given his wife's similar prolonged GI symptoms and the lack of intraabdominal findings on CT scan, currently suspect an infectious colitis of some sort as the etiology of his illness, however confirmatory testing is still pending.        Neuro:   -Multimodal pain control via scheduled tylenol, PRN oxy 5/10       CV:  -Monitor vitals  -Daily CBC  -Continue home ASA 81, carvedilol, losartan with hold parameters      Pulm:  -IS  -Wean O2  -Continue home albuterol     GI:  - regular diet, bowel regimen, PRN ondansetron       :  -Monitor I/O  -Daily BMP  -Continue home lasix 80mg daily  -Complete TOV today        ID:   -fu Ucx, Bcx, NGTD  -Continue PO vancomycin till 10/29  -C diff 10/19 negative  -F/U stool pathogen panel     Prophy:  - SQH  - Pantoprazole     Dispo:  - RNF  - Possible DC tomorrow     Seen and discussed with chief Dr. Nathan, discussed with attending Dr. Cedric Ty MD  PGY-1 Urology [place]  Adult Urology Pager: [phone]  Pediatric Urology Pager: [phone]

## 2024-10-21 NOTE — CARE PLAN
Problem: Pain  Goal: Takes deep breaths with improved pain control throughout the shift  Outcome: Progressing  Goal: Turns in bed with improved pain control throughout the shift  Outcome: Progressing  Goal: Walks with improved pain control throughout the shift  Outcome: Progressing  Goal: Performs ADL's with improved pain control throughout shift  Outcome: Progressing  Goal: Participates in PT with improved pain control throughout the shift  Outcome: Progressing  Goal: Free from opioid side effects throughout the shift  Outcome: Progressing  Goal: Free from acute confusion related to pain meds throughout the shift  Outcome: Progressing     Problem: Pain - Adult  Goal: Verbalizes/displays adequate comfort level or baseline comfort level  Outcome: Progressing     Problem: Safety - Adult  Goal: Free from fall injury  Outcome: Progressing     Problem: Discharge Planning  Goal: Discharge to home or other facility with appropriate resources  Outcome: Progressing     Problem: Chronic Conditions and Co-morbidities  Goal: Patient's chronic conditions and co-morbidity symptoms are monitored and maintained or improved  Outcome: Progressing     Problem: Fall/Injury  Goal: Not fall by end of shift  Outcome: Progressing  Goal: Be free from injury by end of the shift  Outcome: Progressing  Goal: Verbalize understanding of personal risk factors for fall in the hospital  Outcome: Progressing  Goal: Verbalize understanding of risk factor reduction measures to prevent injury from fall in the home  Outcome: Progressing  Goal: Use assistive devices by end of the shift  Outcome: Progressing  Goal: Pace activities to prevent fatigue by end of the shift  Outcome: Progressing     Problem: Skin  Goal: Decreased wound size/increased tissue granulation at next dressing change  Outcome: Progressing  Goal: Participates in plan/prevention/treatment measures  Outcome: Progressing  Goal: Prevent/manage excess moisture  Outcome: Progressing  Goal:  Prevent/minimize sheer/friction injuries  Outcome: Progressing  Goal: Promote/optimize nutrition  Outcome: Progressing  Goal: Promote skin healing  Outcome: Progressing   The patient's goals for the shift include Pain management    The clinical goals for the shift include adequate pain control

## 2024-10-21 NOTE — CONSULTS
Nutrition Initial Assessment:   Nutrition Assessment    Reason for Assessment: Admission nursing screening    Patient is a 63 y.o. male presented to [place] ED (10/18) with subjective fever/chills, myalgia, fatigue     GG3 PCa now s/p RALP with BPLND (10/14)    Workup showed leucocytosis c/f UTI  - per Urology   Transferred to [facility] for urology evaluation     Per Urology: Given his wife's similar prolonged GI symptoms and the lack of intraabdominal findings on CT scan, currently suspect an infectious colitis of some sort as the etiology of his illness, however confirmatory testing is still pending.     Past Medical History:   Diagnosis Date    Arrhythmia     s/p RFA Oct 2022 with Dr. Yeager    Asthma     BPH (benign prostatic hyperplasia)     Cellulitis, unspecified     Cellulitis    Dilated cardiomyopathy (Multi)     DVT (deep venous thrombosis) (Multi)     multiple, several years ago, s/p DOAC    Hyperlipidemia     Hypertension     Low testosterone in male     Peripheral vascular disease (CMS-HCC)     Prostate cancer (Multi)     Seizure (Multi)     in the setting of fever/infection    Sleep apnea     CPAP    Venous insufficiency of both lower extremities     Venous ulcer (Multi)     right foot     Past Surgical History:   Procedure Laterality Date    ANKLE SURGERY Right 11/13/2013    Ankle Surgery    CARDIAC CATHETERIZATION      CARDIAC ELECTROPHYSIOLOGY MAPPING AND ABLATION      for afib    CHOLECYSTECTOMY  11/13/2013    Cholecystectomy    COLONOSCOPY      OTHER SURGICAL HISTORY  09/23/2022    Vascular surgical procedure    PROSTATE BIOPSY  01/2024    PROSTATECTOMY N/A 10/14/2024    robotic radical prostatectomy with BPLND    ROTATOR CUFF REPAIR Bilateral 11/13/2013    Rotator Cuff Repair    WISDOM TOOTH EXTRACTION       Per team pt will likely discharge tomorrow 10/22  Nutrition History:  Energy Intake: Poor < 50 %  Food and Nutrient History: Met with patient this morning. Pt reports PTA/ during admission intake  has decreased. Pt reports consuming <50% of pt's normal intake. Pt reports pt stopped all supplements/ MVI. Pt states not interested in protein shakes and does not need an RDN d/t knowing what he can and can not eat. Pt also reports he will be leaving today and stopped interview short (unable to complete NFPE).  Food Allergies/Intolerances:  None  GI Symptoms: None  Oral Problems: None       Anthropometrics:  Height: 182.9 cm (6')   Weight: 120 kg (265 lb)   BMI (Calculated): 35.93  IBW/kg (Dietitian Calculated): 81 kg  Percent of IBW: 148 %  Adjusted Body Weight (kg): 95 kg    Weight History:   Wt Readings from Last 15 Encounters:   10/20/24 120 kg (265 lb)   10/18/24 120 kg (265 lb)   10/14/24 120 kg (265 lb)   09/27/24 120 kg (265 lb)   07/10/24 120 kg (264 lb)   02/06/24 119 kg (262 lb 11.2 oz)   01/08/24 122 kg (268 lb)   01/05/24 119 kg (261 lb 14.5 oz)   12/07/23 113 kg (250 lb)   10/06/23 118 kg (259 lb 3.2 oz)   06/01/23 120 kg (264 lb)   06/01/23 119 kg (263 lb)   11/21/22 113 kg (249 lb 2 oz)   11/14/22 112 kg (246 lb 12.8 oz)   09/19/22 114 kg (251 lb 5.2 oz)       Weight Change %:  Weight History / % Weight Change: Pt states unsure of wt loss. Per chart review suspect wt's are stated.  Significant Weight Loss: No    Nutrition Focused Physical Exam Findings:  defer: pt reports leaving today, would not like physical exam  Edema:  Edema: none  Physical Findings:  Skin: Negative    Nutrition Significant Labs:  CBC Trend:   Results from last 7 days   Lab Units 10/21/24  0639 10/20/24  0613 10/19/24  0536 10/19/24  0055   WBC AUTO x10*3/uL 9.3 12.2* 19.5* 19.6*   RBC AUTO x10*6/uL 3.99* 3.98* 4.94 4.97   HEMOGLOBIN g/dL 12.7* 12.6* 15.8 15.6   HEMATOCRIT % 37.6* 38.4* 48.3 46.4   MCV fL 94 97 98 93   PLATELETS AUTO x10*3/uL 189 174 214 225    , BMP Trend:   Results from last 7 days   Lab Units 10/21/24  0639 10/20/24  0613 10/19/24  0536 10/19/24  0055   GLUCOSE mg/dL 104* 89 70* 107*   CALCIUM mg/dL 7.9*  "8.4* 9.1 8.8   SODIUM mmol/L 135* 136 137 135*   POTASSIUM mmol/L 4.1 3.5 3.7 3.8   CO2 mmol/L 26 31 28 28   CHLORIDE mmol/L 101 99 95* 96*   BUN mg/dL 21 25* 26* 26*   CREATININE mg/dL 0.90 1.19 1.07 0.96    , A1C:  Lab Results   Component Value Date    HGBA1C 5.8 (A) 06/23/2022   , BG POCT trend:    , Vit D: No results found for: \"VITD25\"     Nutrition Specific Medications:  Scheduled medications  acetaminophen, 975 mg, oral, q6h  aspirin, 81 mg, oral, Daily  carvedilol, 25 mg, oral, BID  furosemide, 80 mg, oral, Daily  heparin (porcine), 5,000 Units, subcutaneous, q8h  losartan, 25 mg, oral, Daily  metroNIDAZOLE, 500 mg, intravenous, q8h  pantoprazole, 20 mg, oral, Daily before breakfast  vancomycin, 125 mg, oral, 4x daily      I/O:   Last BM Date: 10/19/24; Stool Appearance: Liquid (10/20/24 1951)    Dietary Orders (From admission, onward)       Start     Ordered    10/19/24 0041  Adult diet Regular  Diet effective now        Comments: POD 2   Question:  Diet type  Answer:  Regular    10/19/24 0040    10/19/24 0039  May Participate in Room Service  ( ROOM SERVICE MAY PARTICIPATE)  Once        Question:  .  Answer:  Yes    10/19/24 0038                     Estimated Needs:   Total Energy Estimated Needs (kCal):  (~2400)  Method for Estimating Needs: Adjusted BW x 25  Total Protein Estimated Needs (g):  (~114)  Method for Estimating Needs: Adjusted BW x 1.2  Total Fluid Estimated Needs (mL):  (per team)           Nutrition Diagnosis   Malnutrition Diagnosis  Patient has Malnutrition Diagnosis: No    Nutrition Diagnosis  Patient has Nutrition Diagnosis: Yes  Diagnosis Status (1): New  Nutrition Diagnosis 1: Inadequate energy intake  Related to (1): decreased appetite; fatigue  As Evidenced by (1): Pt reported decrease in oral intake d/t decreased appetite       Nutrition Interventions/Recommendations         Nutrition Prescription:    Continue a regular diet as tolerated  Pt would benefit from ONS however pt is " not interested at this time  Pt would benefit from outpatient RDN appt d/t decreased appetite however pt is not interested at this time.   Would recommend obtaining a standing weight when able  Would consider checking A1C    This service is available per pt and team request      Nutrition Interventions:   Interventions: Meals and snacks  Meals and Snacks: General healthful diet      Nutrition Education:   Discussed purpose of RDN in hospital and outpatient setting. Pt reports he is not interested in the assistance from this service.       Nutrition Monitoring and Evaluation   Food/Nutrient Related History Monitoring  Monitoring and Evaluation Plan: Energy intake  Energy Intake: Estimated energy intake  Criteria: >75% of EEN    Time Spent (min): 45 minutes

## 2024-10-22 LAB
ANION GAP SERPL CALC-SCNC: 9 MMOL/L (ref 10–20)
BACTERIA SPEC CULT: ABNORMAL
BUN SERPL-MCNC: 16 MG/DL (ref 6–23)
C COLI+JEJ+UPSA DNA STL QL NAA+PROBE: NOT DETECTED
CALCIUM SERPL-MCNC: 8.1 MG/DL (ref 8.6–10.6)
CHLORIDE SERPL-SCNC: 104 MMOL/L (ref 98–107)
CO2 SERPL-SCNC: 27 MMOL/L (ref 21–32)
CREAT SERPL-MCNC: 0.68 MG/DL (ref 0.5–1.3)
EC STX1 GENE STL QL NAA+PROBE: NOT DETECTED
EC STX2 GENE STL QL NAA+PROBE: NOT DETECTED
EGFRCR SERPLBLD CKD-EPI 2021: >90 ML/MIN/1.73M*2
ERYTHROCYTE [DISTWIDTH] IN BLOOD BY AUTOMATED COUNT: 12.2 % (ref 11.5–14.5)
GLUCOSE SERPL-MCNC: 92 MG/DL (ref 74–99)
GRAM STN SPEC: ABNORMAL
GRAM STN SPEC: ABNORMAL
HCT VFR BLD AUTO: 37.6 % (ref 41–52)
HGB BLD-MCNC: 12.9 G/DL (ref 13.5–17.5)
MAGNESIUM SERPL-MCNC: 1.81 MG/DL (ref 1.6–2.4)
MCH RBC QN AUTO: 31.9 PG (ref 26–34)
MCHC RBC AUTO-ENTMCNC: 34.3 G/DL (ref 32–36)
MCV RBC AUTO: 93 FL (ref 80–100)
NOROVIRUS GI + GII RNA STL NAA+PROBE: NOT DETECTED
NRBC BLD-RTO: 0 /100 WBCS (ref 0–0)
PLATELET # BLD AUTO: 225 X10*3/UL (ref 150–450)
POTASSIUM SERPL-SCNC: 4.1 MMOL/L (ref 3.5–5.3)
RBC # BLD AUTO: 4.05 X10*6/UL (ref 4.5–5.9)
RV RNA STL NAA+PROBE: NOT DETECTED
SALMONELLA DNA STL QL NAA+PROBE: NOT DETECTED
SHIGELLA DNA SPEC QL NAA+PROBE: NOT DETECTED
SODIUM SERPL-SCNC: 136 MMOL/L (ref 136–145)
V CHOLERAE DNA STL QL NAA+PROBE: NOT DETECTED
VANCOMYCIN SERPL-MCNC: <2 UG/ML (ref 5–20)
WBC # BLD AUTO: 8.4 X10*3/UL (ref 4.4–11.3)
Y ENTEROCOL DNA STL QL NAA+PROBE: NOT DETECTED

## 2024-10-22 PROCEDURE — 2500000001 HC RX 250 WO HCPCS SELF ADMINISTERED DRUGS (ALT 637 FOR MEDICARE OP): Performed by: STUDENT IN AN ORGANIZED HEALTH CARE EDUCATION/TRAINING PROGRAM

## 2024-10-22 PROCEDURE — 2500000001 HC RX 250 WO HCPCS SELF ADMINISTERED DRUGS (ALT 637 FOR MEDICARE OP)

## 2024-10-22 PROCEDURE — 36415 COLL VENOUS BLD VENIPUNCTURE: CPT | Performed by: INTERNAL MEDICINE

## 2024-10-22 PROCEDURE — 85027 COMPLETE CBC AUTOMATED: CPT

## 2024-10-22 PROCEDURE — 99222 1ST HOSP IP/OBS MODERATE 55: CPT | Performed by: INTERNAL MEDICINE

## 2024-10-22 PROCEDURE — 80048 BASIC METABOLIC PNL TOTAL CA: CPT

## 2024-10-22 PROCEDURE — 2500000004 HC RX 250 GENERAL PHARMACY W/ HCPCS (ALT 636 FOR OP/ED): Performed by: STUDENT IN AN ORGANIZED HEALTH CARE EDUCATION/TRAINING PROGRAM

## 2024-10-22 PROCEDURE — 1200000003 HC ONCOLOGY  ROOM WITH TELEMETRY DAILY

## 2024-10-22 PROCEDURE — 2500000001 HC RX 250 WO HCPCS SELF ADMINISTERED DRUGS (ALT 637 FOR MEDICARE OP): Performed by: NURSE PRACTITIONER

## 2024-10-22 PROCEDURE — 2500000002 HC RX 250 W HCPCS SELF ADMINISTERED DRUGS (ALT 637 FOR MEDICARE OP, ALT 636 FOR OP/ED): Performed by: STUDENT IN AN ORGANIZED HEALTH CARE EDUCATION/TRAINING PROGRAM

## 2024-10-22 PROCEDURE — 2500000004 HC RX 250 GENERAL PHARMACY W/ HCPCS (ALT 636 FOR OP/ED): Mod: JZ

## 2024-10-22 PROCEDURE — 36415 COLL VENOUS BLD VENIPUNCTURE: CPT

## 2024-10-22 PROCEDURE — 83735 ASSAY OF MAGNESIUM: CPT

## 2024-10-22 PROCEDURE — 80202 ASSAY OF VANCOMYCIN: CPT | Performed by: INTERNAL MEDICINE

## 2024-10-22 RX ORDER — CIPROFLOXACIN 500 MG/1
500 TABLET ORAL EVERY 12 HOURS SCHEDULED
Status: DISCONTINUED | OUTPATIENT
Start: 2024-10-22 | End: 2024-10-23 | Stop reason: HOSPADM

## 2024-10-22 RX ORDER — METRONIDAZOLE 500 MG/1
500 TABLET ORAL EVERY 8 HOURS SCHEDULED
Status: DISCONTINUED | OUTPATIENT
Start: 2024-10-22 | End: 2024-10-23 | Stop reason: HOSPADM

## 2024-10-22 ASSESSMENT — ENCOUNTER SYMPTOMS
BACK PAIN: 0
MYALGIAS: 1
SHORTNESS OF BREATH: 0
DIFFICULTY URINATING: 0
ACTIVITY CHANGE: 0
ABDOMINAL DISTENTION: 1
COUGH: 0
DYSURIA: 0
FATIGUE: 1
ABDOMINAL PAIN: 1
DIARRHEA: 1
CHILLS: 1
FLANK PAIN: 0
FEVER: 1

## 2024-10-22 ASSESSMENT — COGNITIVE AND FUNCTIONAL STATUS - GENERAL
DAILY ACTIVITIY SCORE: 24
MOBILITY SCORE: 24

## 2024-10-22 ASSESSMENT — PAIN - FUNCTIONAL ASSESSMENT
PAIN_FUNCTIONAL_ASSESSMENT: 0-10

## 2024-10-22 ASSESSMENT — PAIN SCALES - GENERAL
PAINLEVEL_OUTOF10: 2
PAINLEVEL_OUTOF10: 2
PAINLEVEL_OUTOF10: 3

## 2024-10-22 NOTE — PROGRESS NOTES
Domingo Greer is a 63 y.o. male on day 3 of admission presenting with Prostate cancer (Multi).    Subjective   - No acute events overnight   - Patient denies abdominal pain, fever, chills, nausea, vomiting. He reports subjective improvement in his diarrhea. (1x/hr ; small volume)   - Spiked a fever to 38.2 overnight   - Labs unremarkable with stable Hgb WBC and Cr  - Patient passed TOV yesterday  -Stool pathogen analysis unremarkable      Objective     Physical Exam  General: resting comfortably in bed  Neuro: alert and oriented, no obvious focal deficit   Head/Neck: normocephalic, atraumatic  ENT: moist mucous membranes  CV: regular rate and rhythm on telemetry  Pulm: nonlabored breathing on room air, no conversational dyspnea  Abd: soft, mildly distended, tender, port site incisions with some superficial dehiscence and drainage through the wounds, diffuse red rash on bilateral lower quadrants remains stable  : voiding spontaneously  MSK: TERRY, no gross deformities  Psych: mood and behavior normal    Last Recorded Vitals  Blood pressure 115/69, pulse 68, temperature 36.5 °C (97.7 °F), temperature source Temporal, resp. rate 18, height 1.829 m (6'), weight 120 kg (265 lb), SpO2 95%.  Intake/Output last 3 Shifts:  I/O last 3 completed shifts:  In: 100 (0.8 mL/kg) [IV Piggyback:100]  Out: 1350 (11.2 mL/kg) [Urine:1350 (0.3 mL/kg/hr)]  Weight: 120.2 kg     Relevant Results  Scheduled medications  acetaminophen, 975 mg, oral, q6h  aspirin, 81 mg, oral, Daily  carvedilol, 25 mg, oral, BID  furosemide, 80 mg, oral, Daily  heparin (porcine), 5,000 Units, subcutaneous, q8h  losartan, 25 mg, oral, Daily  metroNIDAZOLE, 500 mg, intravenous, q8h  pantoprazole, 20 mg, oral, Daily before breakfast  vancomycin, 125 mg, oral, 4x daily      Continuous medications       PRN medications  PRN medications: albuterol, ondansetron **OR** ondansetron, oxyCODONE, oxyCODONE    Results for orders placed or performed during the hospital  encounter of 10/19/24 (from the past 24 hours)   Vancomycin   Result Value Ref Range    Vancomycin <2.0 (L) 5.0 - 20.0 ug/mL   CBC   Result Value Ref Range    WBC 8.4 4.4 - 11.3 x10*3/uL    nRBC 0.0 0.0 - 0.0 /100 WBCs    RBC 4.05 (L) 4.50 - 5.90 x10*6/uL    Hemoglobin 12.9 (L) 13.5 - 17.5 g/dL    Hematocrit 37.6 (L) 41.0 - 52.0 %    MCV 93 80 - 100 fL    MCH 31.9 26.0 - 34.0 pg    MCHC 34.3 32.0 - 36.0 g/dL    RDW 12.2 11.5 - 14.5 %    Platelets 225 150 - 450 x10*3/uL   Basic metabolic panel   Result Value Ref Range    Glucose 92 74 - 99 mg/dL    Sodium 136 136 - 145 mmol/L    Potassium 4.1 3.5 - 5.3 mmol/L    Chloride 104 98 - 107 mmol/L    Bicarbonate 27 21 - 32 mmol/L    Anion Gap 9 (L) 10 - 20 mmol/L    Urea Nitrogen 16 6 - 23 mg/dL    Creatinine 0.68 0.50 - 1.30 mg/dL    eGFR >90 >60 mL/min/1.73m*2    Calcium 8.1 (L) 8.6 - 10.6 mg/dL   Magnesium   Result Value Ref Range    Magnesium 1.81 1.60 - 2.40 mg/dL       CT abdomen pelvis w IV contrast    Result Date: 10/18/2024  Interpreted By:  Wilner Pérez, STUDY: CT ABDOMEN PELVIS W IV CONTRAST;  10/18/2024 4:35 pm   INDICATION: Signs/Symptoms:fever, chills, abd pain, recent prostatectomy on 10/14.     COMPARISON: June 2, 2016 chest CT, December 20, 2023 MRI prostate, February 12, 2024 PET-CT   ACCESSION NUMBER(S): [id number]   ORDERING CLINICIAN: SANTIAGO FELDER   TECHNIQUE: CT of the abdomen and pelvis was performed.  Standard contiguous axial images were obtained at 3 mm slice thickness through the abdomen and pelvis. Coronal and sagittal reconstructions at 3 mm slice thickness were performed.  75 ML of Omnipaque 350 was administered intravenously without immediate complication.   FINDINGS: LOWER CHEST: Newly seen bandlike density left base with elevation of the adjacent hemidiaphragm favoring atelectasis.   ABDOMEN:   LIVER: The liver demonstrates homogeneous attenuation without evidence of focal liver lesions.   BILE DUCTS: Normal caliber.   GALLBLADDER:  Status post cholecystectomy.   PANCREAS: The pancreas appears unremarkable without evidence of ductal dilatation or masses.   SPLEEN: The spleen is normal in size without focal lesions.   ADRENAL GLANDS: Within normal limits.   KIDNEYS AND URETERS: The kidneys are normal in size and enhance symmetrically.  No hydroureteronephrosis or nephroureterolithiasis is identified.   PELVIS:   BLADDER: Decompressed by Madrigal catheter. Mural prominence likely related to underdistention. Trace gas within the lumen.   REPRODUCTIVE ORGANS: Status post prostatectomy with trace gas and minimal indistinctness of the fat at the operative fossa interposed between the urinary bladder and rectum sagittal image 5/83, expected postoperative appearance.   BOWEL: Moderate fluid within mildly prominent caliber proximal small bowel favoring postoperative ileus. No pneumatosis. The bowel demonstrates grossly uniform enhancement. Normal caliber appendix.   VESSELS: Scattered atherosclerosis. The principal central vessels are patent.   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 The right-sided collection 3.1 x 2.3 x 3.4 cm axial image 2/122 corresponding to estimated volume of 13 mL and the left-sided collection measures 2.9 x 2.0 x 2.8 cm image 2/123 corresponding to estimated volume of 8 mL. There are scattered gas bubbles within the peritoneal cavity compatible with tremor related gas.   BONES AND ABDOMINAL WALL: No suspicious osseous lesions are identified. Degenerative discogenic disease is noted in the lower thoracic and lumbar spine.  There is body wall edema most pronounced pelvic region. Minimal chamber related gas within the ventral body wall.   Similar prominent inguinal lymph nodes which may be reactive although nonspecific. Findings include 18 mm short axis right inguinal node image  2/157.   Similar small fat containing inguinal hernias containing extraperitoneal fat. There is minimal newly seen gas along the right medial spermatic cord image 2/159 likely related to recent surgery.       1.  Status post recent prostatectomy with treatment related scattered gas and minimal fluid within the peritoneal cavity. 2. Bilateral retroperitoneal simple fluid collections and gas adjacent to the medial aspect of the external iliac vessels estimated at 13 mL on the right and 8 mL on the left. 3. Body wall edema and trace gas. 4. Probable postoperative ileus and likely postoperative subsegmental atelectasis left base. 5. Similar bilateral extraperitoneal small indirect fat containing inguinal hernias. 6. Similar prominent inguinal lymph nodes which may be reactive although nonspecific. Attention recommended at follow-up. 7. Newly seen minimal gas along the right sporadic cord likely related to recent surgery although attention to this region recommended on clinical assessment.     MACRO: None   Signed by: Wilner Pérez 10/18/2024 5:33 PM Dictation workstation:   [id number]                This patient has a urinary catheter   Reason for the urinary catheter remaining today? 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 Steele on 10/14. Patient presented to [place] ED 10/18 with subjective fever/chills, myalgia, and fatigue. Workup there showed leucocytosis. UA concerning for UTI, and CT with non-specific findings of bilateral fluid collections (favored to be hemostatic agent placed in LND bed during recent procedure). Patient was transferred to [facility] for urologic evaluation. Given his wife's similar prolonged GI symptoms and the lack  of intraabdominal findings on CT scan, currently suspect an infectious colitis of some sort as the etiology of his illness.        Neuro:   -Multimodal pain control via scheduled tylenol, PRN oxy 5/10       CV:  -Monitor vitals  -Daily CBC  -Continue home ASA 81, carvedilol, losartan with hold parameters      Pulm:  -IS  -Wean O2  -Continue home albuterol     GI:  - regular diet, bowel regimen, PRN ondansetron       :  -Monitor I/O  -Daily BMP  -Continue home lasix 80mg daily       ID:   -Ucx negative, Bcx negative  -Managing for presumed c. Diff colitis with Flagyl and Vanc. (Continue PO vancomycin till 10/29)  -C diff 10/19 negative  -Stool pathogen panel 10/22 negative   - ID consult considering unclear etiology of intermittent fevers and persistent diarrhea     Prophy:  - SQH  - Pantoprazole     Dispo:  - RNF  - Possible DC tomorrow     Discussed with attending Dr. Cedric Nathan MD  PGY-5 Urology [place]  Adult Urology Pager: [phone]  Pediatric Urology Pager: [phone]

## 2024-10-22 NOTE — CARE PLAN
Problem: Pain  Goal: Takes deep breaths with improved pain control throughout the shift  Outcome: Progressing  Goal: Turns in bed with improved pain control throughout the shift  Outcome: Progressing  Goal: Walks with improved pain control throughout the shift  Outcome: Progressing  Goal: Performs ADL's with improved pain control throughout shift  Outcome: Progressing  Goal: Participates in PT with improved pain control throughout the shift  Outcome: Progressing  Goal: Free from opioid side effects throughout the shift  Outcome: Progressing  Goal: Free from acute confusion related to pain meds throughout the shift  Outcome: Progressing     Problem: Pain - Adult  Goal: Verbalizes/displays adequate comfort level or baseline comfort level  Outcome: Progressing     Problem: Safety - Adult  Goal: Free from fall injury  Outcome: Progressing     Problem: Discharge Planning  Goal: Discharge to home or other facility with appropriate resources  Outcome: Progressing     Problem: Chronic Conditions and Co-morbidities  Goal: Patient's chronic conditions and co-morbidity symptoms are monitored and maintained or improved  Outcome: Progressing     Problem: Fall/Injury  Goal: Not fall by end of shift  Outcome: Progressing  Goal: Be free from injury by end of the shift  Outcome: Progressing  Goal: Verbalize understanding of personal risk factors for fall in the hospital  Outcome: Progressing  Goal: Verbalize understanding of risk factor reduction measures to prevent injury from fall in the home  Outcome: Progressing  Goal: Use assistive devices by end of the shift  Outcome: Progressing  Goal: Pace activities to prevent fatigue by end of the shift  Outcome: Progressing     Problem: Skin  Goal: Decreased wound size/increased tissue granulation at next dressing change  Outcome: Progressing  Goal: Participates in plan/prevention/treatment measures  Outcome: Progressing  Goal: Prevent/manage excess moisture  Outcome: Progressing  Goal:  Prevent/minimize sheer/friction injuries  Outcome: Progressing  Goal: Promote/optimize nutrition  Outcome: Progressing  Goal: Promote skin healing  Outcome: Progressing   The patient's goals for the shift include Pain management    The clinical goals for the shift include rest and relaxation

## 2024-10-22 NOTE — PROGRESS NOTES
Per the medical team, this patient has no anticipated discharge needs; full assessment deferred at this time. Will continue to follow for any home going needs that arise.   Naima Garcia RN [facility]

## 2024-10-22 NOTE — CONSULTS
Inpatient consult to Infectious Diseases  Consult performed by: Tal Zarate  Consult ordered by: Maik Steele MD      Primary MD: Erika Deng, FATUMA-CNP    Reason For Consult  Diarrhea - low grade fever     History Of Present Illness  Domingo Greer is a 63 y.o. male presenting with diarrhea and fever in the setting of prostate cancer and recent robot-assisted prostatectomy on 10/14/24.    After his surgery, he presented to the  [place] ED on 10/18 with fevers, chills, myalgias, fatigue, and new-onset bilateral abdominal pain which began the evening prior. He had also had a couple bouts of diarrhea prior to coming to the hospital, and notably, wife was also having profound diarrhea starting on 10/16, 2 days before patient. He was found to have fever up to 101.8 F, WBC of 13 and UA concerning for UTI (, 21-50 WBC), and CT with findings of bilateral fluid collections (thought to be hemostatic agent used during recent surgery). He also had diffuse redness/rash on the lower abdominal quadrants, favored to be rash over cellulitis, and had mild serous drainage from abdominal laparoscopic incisions, which was thought to be normal. He was given one dose of IV metronidazole + ceftriaxone at OSH, which was then discontinued. Patient was transferred to [facility] late in 10/18 pm for further urologic evaluation.     At [facility], he began having worsening diarrhea with 7 bouts overnight from 10/18-10/19 am and was started on broad-spectrum IV vanc/cefepime. Patient became dehydrated and BP dropped, requiring IV fluids. C. Diff PCR was sent and was not detected, but vanc was switched from IV to oral on 10/20 AM. Metronidazole was added 10/20 AM in the setting of persistent diarrhea. Cefepime was discontinued 10/20 PM. Patient now on metronidazole and oral vancomycin. Stool pathogen panel, including norovirus, was negative on 10/21. Temperature normalized during 10/20, but patient spiked another fever up to 100.7 on  10/21. Blood cultures and urine cultures no growth since 10/18.    Notably, he sometimes experiences diarrhea when on antibiotics, but never to this degree. Also, patient does have additional prior history of recurrent lower extremity infections and ulcers 2/2 venous stasis and lymphedema. Most have been sensitive to cephalexin, although he has had resistant infections on at least one occasion which required escalation of therapy (pt did not remember which antibiotics used). However, he does not have other known history of intra-abdominal infections. Patient works as  in [employer].     Past Medical History  He has a past medical history of Arrhythmia, Asthma, BPH (benign prostatic hyperplasia), Cellulitis, unspecified, Dilated cardiomyopathy (Multi), DVT (deep venous thrombosis) (Multi), Hyperlipidemia, Hypertension, Low testosterone in male, Peripheral vascular disease (CMS-HCC), Prostate cancer (Multi), Seizure (Multi), Sleep apnea, Venous insufficiency of both lower extremities, and Venous ulcer (Multi).    Surgical History  He has a past surgical history that includes Rotator cuff repair (Bilateral, 11/13/2013); Cholecystectomy (11/13/2013); Ankle surgery (Right, 11/13/2013); Other surgical history (09/23/2022); Cardiac electrophysiology mapping and ablation; Colonoscopy; Cardiac catheterization; Jelm tooth extraction; Prostate biopsy (01/2024); and Prostatectomy (N/A, 10/14/2024).     Social History     Occupational History    Not on file   Tobacco Use    Smoking status: Former     Types: Cigarettes    Smokeless tobacco: Never   Vaping Use    Vaping status: Never Used   Substance and Sexual Activity    Alcohol use: Yes     Alcohol/week: 2.0 standard drinks of alcohol     Types: 2 Shots of liquor per week    Drug use: Never    Sexual activity: Not on file     Travel History   Travel since 09/22/24    No documented travel since 09/22/24            Pets: unknown  Hobbies:  unknown    Family History  Family History   Problem Relation Name Age of Onset    Hypertension Mother      Prostate cancer Father       Allergies  Other, Penicillin, and Bee venom protein (honey bee)     Immunization History   Administered Date(s) Administered    Influenza, Unspecified 09/07/2011    Influenza, seasonal, injectable 09/07/2011    Pfizer Purple Cap SARS-CoV-2 02/26/2021, 03/19/2021, 11/24/2021     Medications  Home medications:  Medications Prior to Admission   Medication Sig Dispense Refill Last Dose/Taking    acetaminophen (Tylenol) 500 mg tablet Take 2 tablets (1,000 mg) by mouth every 6 hours if needed for mild pain (1 - 3). (Patient not taking: Reported on 10/19/2024) 30 tablet 0 Not Taking    albuterol 90 mcg/actuation inhaler Inhale 1 puff every 6 hours if needed for wheezing or shortness of breath.       aspirin 81 mg EC tablet Take 1 tablet (81 mg) by mouth once daily.       carvedilol (Coreg) 25 mg tablet Take 1 tablet (25 mg) by mouth 2 times daily (morning and late afternoon). 180 tablet 3     esomeprazole (NexIUM) 20 mg DR capsule Take 1 capsule (20 mg) by mouth once daily in the morning. Take before meals. Do not open capsule. 10 capsule 0     furosemide (Lasix) 80 mg tablet Take 1 tablet (80 mg) by mouth once daily. 90 tablet 2     ketorolac (Toradol) 10 mg tablet Take 1 tablet (10 mg) by mouth every 6 hours if needed for moderate pain (4 - 6). 20 tablet 0     losartan (Cozaar) 25 mg tablet Take 1 tablet (25 mg) by mouth once daily. 90 tablet 3     sennosides (Senokot) 8.6 mg tablet Take 1 tablet (8.6 mg) by mouth once daily. (Patient not taking: Reported on 10/19/2024) 10 tablet 0 Not Taking     Current medications:  Scheduled medications  acetaminophen, 975 mg, oral, q6h  aspirin, 81 mg, oral, Daily  carvedilol, 25 mg, oral, BID  furosemide, 80 mg, oral, Daily  heparin (porcine), 5,000 Units, subcutaneous, q8h  losartan, 25 mg, oral, Daily  metroNIDAZOLE, 500 mg, intravenous,  q8h  pantoprazole, 20 mg, oral, Daily before breakfast  vancomycin, 125 mg, oral, 4x daily      Continuous medications     PRN medications  PRN medications: albuterol, ondansetron **OR** ondansetron, oxyCODONE, oxyCODONE    Review of Systems   Constitutional:  Positive for chills, fatigue and fever. Negative for activity change.   Respiratory:  Negative for cough and shortness of breath.    Cardiovascular:  Negative for chest pain and leg swelling.   Gastrointestinal:  Positive for abdominal distention, abdominal pain and diarrhea.   Genitourinary:  Negative for difficulty urinating, dysuria and flank pain.   Musculoskeletal:  Positive for myalgias. Negative for back pain.   Skin:  Negative for rash.     Objective  Range of Vitals (last 24 hours)  Heart Rate:  [67-74]   Temp:  [36.4 °C (97.5 °F)-38.2 °C (100.7 °F)]   Resp:  [16-18]   BP: (107-157)/(69-87)   SpO2:  [95 %-97 %]   Daily Weight  10/20/24 : 120 kg (265 lb)    Body mass index is 35.94 kg/m².     Physical Exam  Constitutional:       General: He is not in acute distress.     Appearance: He is not ill-appearing or toxic-appearing.   HENT:      Head: Normocephalic and atraumatic.      Mouth/Throat:      Mouth: Mucous membranes are moist.      Pharynx: Oropharynx is clear.   Eyes:      Extraocular Movements: Extraocular movements intact.      Conjunctiva/sclera: Conjunctivae normal.   Cardiovascular:      Rate and Rhythm: Normal rate and regular rhythm.      Heart sounds: Murmur heard.      Comments: Early systolic murmur auscultated in L upper sternal border  Pulmonary:      Effort: Pulmonary effort is normal. No respiratory distress.      Breath sounds: No wheezing, rhonchi or rales.   Abdominal:      General: There is distension.      Palpations: There is no mass.      Tenderness: There is abdominal tenderness. There is no guarding.      Comments: Abdomen distended with mild diffuse tenderness to palpation. Laparosopic sites nontender, non-indurated, appear  well-healing.   Musculoskeletal:         General: No tenderness.      Right lower leg: Edema present.      Left lower leg: No edema.      Comments: Mild edema and chronic venous stasis changes in RLE. Wearing compression stockings.   Skin:     General: Skin is warm and dry.   Neurological:      General: No focal deficit present.      Mental Status: He is alert and oriented to person, place, and time.   Psychiatric:         Mood and Affect: Mood normal.         Behavior: Behavior normal.          Relevant Results  Outside Hospital Results  Yes - 10/18  [place]  CBC WITH AUTO DIFFERENTIAL - Abnormal       Result Value      WBC 13.1 (*)       nRBC 0.0        RBC 4.90        Hemoglobin 15.6        Hematocrit 46.0        MCV 94        MCH 31.8        MCHC 33.9        RDW 12.2        Platelets 214        Neutrophils % 91.1        Immature Granulocytes %, Automated 0.5        Lymphocytes % 3.0        Monocytes % 3.4        Eosinophils % 1.8        Basophils % 0.2        Neutrophils Absolute 11.87 (*)       Immature Granulocytes Absolute, Automated 0.07        Lymphocytes Absolute 0.39 (*)       Monocytes Absolute 0.45        Eosinophils Absolute 0.24        Basophils Absolute 0.03      COMPREHENSIVE METABOLIC PANEL - Abnormal     Glucose 109 (*)       Sodium 136        Potassium 3.9        Chloride 99        Bicarbonate 30        Anion Gap 11        Urea Nitrogen 25 (*)       Creatinine 0.95        eGFR 90        Calcium 8.9        Albumin 4.1        Alkaline Phosphatase 45        Total Protein 6.7        AST 30        Bilirubin, Total 1.3 (*)       ALT 37      URINALYSIS WITH REFLEX CULTURE AND MICROSCOPIC - Abnormal     Color, Urine Yellow        Appearance, Urine Turbid (*)       Specific Gravity, Urine 1.028        pH, Urine 6.0        Protein, Urine 70 (1+) (*)       Glucose, Urine Normal        Blood, Urine OVER (3+) (*)       Ketones, Urine 10 (1+) (*)       Bilirubin, Urine NEGATIVE        Urobilinogen, Urine  Normal        Nitrite, Urine NEGATIVE        Leukocyte Esterase, Urine 500 Mervat/µL (*)       Narrative:      OVER is reported when the result is greater than the clinically reportable range.   MICROSCOPIC ONLY, URINE - Abnormal     WBC, Urine 21-50 (*)       RBC, Urine >20 (*)       Mucus, Urine FEW      URINE CULTURE   URINALYSIS WITH REFLEX CULTURE AND MICROSCOPIC     Narrative:      The following orders were created for panel order Urinalysis with Reflex Culture and Microscopic.  Procedure                               Abnormality         Status                     ---------                               -----------         ------                     Urinalysis with Reflex C...[[id number]]  Abnormal            Final result               Extra Urine Gray Tube[[id number]]                            In process                    Please view results for these tests on the individual orders.   EXTRA URINE GRAY TUBE      CT abdomen pelvis w IV contrast   Final Result   1.  Status post recent prostatectomy with treatment related scattered   gas and minimal fluid within the peritoneal cavity.   2. Bilateral retroperitoneal simple fluid collections and gas   adjacent to the medial aspect of the external iliac vessels estimated   at 13 mL on the right and 8 mL on the left.   3. Body wall edema and trace gas.   4. Probable postoperative ileus and likely postoperative subsegmental   atelectasis left base.   5. Similar bilateral extraperitoneal small indirect fat containing   inguinal hernias.   6. Similar prominent inguinal lymph nodes which may be reactive   although nonspecific. Attention recommended at follow-up.   7. Newly seen minimal gas along the right sporadic cord likely   related to recent surgery although attention to this region   recommended on clinical assessment.       Labs  Results from last 72 hours   Lab Units 10/21/24  0639 10/20/24  0613   WBC AUTO x10*3/uL 9.3 12.2*   HEMOGLOBIN g/dL 12.7* 12.6*  "  HEMATOCRIT % 37.6* 38.4*   PLATELETS AUTO x10*3/uL 189 174     Results from last 72 hours   Lab Units 10/21/24  0639 10/20/24  0613   SODIUM mmol/L 135* 136   POTASSIUM mmol/L 4.1 3.5   CHLORIDE mmol/L 101 99   CO2 mmol/L 26 31   BUN mg/dL 21 25*   CREATININE mg/dL 0.90 1.19   GLUCOSE mg/dL 104* 89   CALCIUM mg/dL 7.9* 8.4*   ANION GAP mmol/L 12 10   EGFR mL/min/1.73m*2 >90 69         Estimated Creatinine Clearance: 113.9 mL/min (by C-G formula based on SCr of 0.9 mg/dL).  No results found for: \"CRP\", \"SEDRATE\"  No results found for: \"HIV1X2\", \"HIVCONF\", \"XQPNTY2IL\"  Hepatitis C Ab   Date Value Ref Range Status   06/01/2023 NONREACTIVE NONREACTIVE Final     Comment:      Results from patients taking biotin supplements or receiving   high-dose biotin therapy should be interpreted with caution   due to possible interference with this test. Providers may    contact their local laboratory for further information.         Imaging    IMPRESSION:  1.  Status post recent prostatectomy with treatment related scattered  gas and minimal fluid within the peritoneal cavity.  2. Bilateral retroperitoneal simple fluid collections and gas  adjacent to the medial aspect of the external iliac vessels estimated  at 13 mL on the right and 8 mL on the left.  3. Body wall edema and trace gas.  4. Probable postoperative ileus and likely postoperative subsegmental  atelectasis left base.  5. Similar bilateral extraperitoneal small indirect fat containing  inguinal hernias.  6. Similar prominent inguinal lymph nodes which may be reactive  although nonspecific. Attention recommended at follow-up.  7. Newly seen minimal gas along the right sporadic cord likely  related to recent surgery although attention to this region  recommended on clinical assessment.    Assessment/Plan     Domingo Greer is a 63 y.o. male presenting with diarrhea and fever in the setting of prostate cancer and recent robot-assisted prostatectomy on 10/14/24.    He " presented with leukocytosis, fevers, chills, fatigue, and mild diarrhea, which worsened during his stay, requiring IVF. Patient negative for C. Diff and other stool pathogens. His leukocytosis, fevers and chills subsided with initial antibiotic therapy and acetaminophen, but worsened after narrowing antibiotics to oral vancomycin and IV metronidazole. Therefore, although fluid collections on CT are unlikely to be abscess (more likely hemostatic agent from surgery), it is possible that patient may have minor intra-abdominal bacterial infection that still requires treatment. Therefore, we recommend starting 7 day course of PO ciprofloxacin and metronidazole.    Other possibilities include C. Diff, which is common after hospitalization and antibiotic use, but this was ruled out by PCR. Food poisoning/contamination from illness that wife had is also possible, but would not typically last this long. Viral infection is also a possibility, but with the presence of fevers and leukocytosis, it is appropriate to treat for possible bacterial infection at this time.    Recommendations:    Start 7-day course of oral ciprofloxacin + metronidazole.  Discontinue oral vancomycin, as C. Diff ruled out based on recent negative PCR.  Discontinue IV metronidazole.  Pause acetaminophen to monitor accurate temperatures.  Continue hydration and current management by primary team for diarrhea.    Thank you for the interesting consult. This patient was seen and evaluated with attending physician Dr. Rajesh Gross.    Tal Zarate MS4 [facility]

## 2024-10-23 VITALS
HEIGHT: 72 IN | DIASTOLIC BLOOD PRESSURE: 71 MMHG | HEART RATE: 72 BPM | BODY MASS INDEX: 35.89 KG/M2 | RESPIRATION RATE: 16 BRPM | WEIGHT: 265 LBS | TEMPERATURE: 98.1 F | SYSTOLIC BLOOD PRESSURE: 147 MMHG | OXYGEN SATURATION: 100 %

## 2024-10-23 LAB
ALBUMIN SERPL BCP-MCNC: 3.7 G/DL (ref 3.4–5)
ANION GAP SERPL CALC-SCNC: 12 MMOL/L (ref 10–20)
BACTERIA BLD CULT: NORMAL
BACTERIA BLD CULT: NORMAL
BUN SERPL-MCNC: 18 MG/DL (ref 6–23)
CALCIUM SERPL-MCNC: 8.6 MG/DL (ref 8.6–10.6)
CHLORIDE SERPL-SCNC: 104 MMOL/L (ref 98–107)
CO2 SERPL-SCNC: 27 MMOL/L (ref 21–32)
CREAT SERPL-MCNC: 0.71 MG/DL (ref 0.5–1.3)
EGFRCR SERPLBLD CKD-EPI 2021: >90 ML/MIN/1.73M*2
ERYTHROCYTE [DISTWIDTH] IN BLOOD BY AUTOMATED COUNT: 12.3 % (ref 11.5–14.5)
GLUCOSE SERPL-MCNC: 91 MG/DL (ref 74–99)
HCT VFR BLD AUTO: 43.5 % (ref 41–52)
HGB BLD-MCNC: 14.5 G/DL (ref 13.5–17.5)
MCH RBC QN AUTO: 33 PG (ref 26–34)
MCHC RBC AUTO-ENTMCNC: 33.3 G/DL (ref 32–36)
MCV RBC AUTO: 99 FL (ref 80–100)
NRBC BLD-RTO: 0 /100 WBCS (ref 0–0)
PHOSPHATE SERPL-MCNC: 2.5 MG/DL (ref 2.5–4.9)
PLATELET # BLD AUTO: 262 X10*3/UL (ref 150–450)
POTASSIUM SERPL-SCNC: 4.1 MMOL/L (ref 3.5–5.3)
RBC # BLD AUTO: 4.39 X10*6/UL (ref 4.5–5.9)
SODIUM SERPL-SCNC: 139 MMOL/L (ref 136–145)
WBC # BLD AUTO: 10.3 X10*3/UL (ref 4.4–11.3)

## 2024-10-23 PROCEDURE — 2500000001 HC RX 250 WO HCPCS SELF ADMINISTERED DRUGS (ALT 637 FOR MEDICARE OP): Performed by: STUDENT IN AN ORGANIZED HEALTH CARE EDUCATION/TRAINING PROGRAM

## 2024-10-23 PROCEDURE — 85027 COMPLETE CBC AUTOMATED: CPT | Performed by: STUDENT IN AN ORGANIZED HEALTH CARE EDUCATION/TRAINING PROGRAM

## 2024-10-23 PROCEDURE — 2500000004 HC RX 250 GENERAL PHARMACY W/ HCPCS (ALT 636 FOR OP/ED): Performed by: STUDENT IN AN ORGANIZED HEALTH CARE EDUCATION/TRAINING PROGRAM

## 2024-10-23 PROCEDURE — 36415 COLL VENOUS BLD VENIPUNCTURE: CPT | Performed by: STUDENT IN AN ORGANIZED HEALTH CARE EDUCATION/TRAINING PROGRAM

## 2024-10-23 PROCEDURE — 80069 RENAL FUNCTION PANEL: CPT | Performed by: STUDENT IN AN ORGANIZED HEALTH CARE EDUCATION/TRAINING PROGRAM

## 2024-10-23 PROCEDURE — 2500000001 HC RX 250 WO HCPCS SELF ADMINISTERED DRUGS (ALT 637 FOR MEDICARE OP): Performed by: NURSE PRACTITIONER

## 2024-10-23 PROCEDURE — 2500000002 HC RX 250 W HCPCS SELF ADMINISTERED DRUGS (ALT 637 FOR MEDICARE OP, ALT 636 FOR OP/ED): Performed by: STUDENT IN AN ORGANIZED HEALTH CARE EDUCATION/TRAINING PROGRAM

## 2024-10-23 RX ORDER — METRONIDAZOLE 500 MG/1
500 TABLET ORAL 3 TIMES DAILY
Qty: 21 TABLET | Refills: 0 | Status: SHIPPED | OUTPATIENT
Start: 2024-10-23 | End: 2024-10-30

## 2024-10-23 RX ORDER — CIPROFLOXACIN 500 MG/1
500 TABLET ORAL 2 TIMES DAILY
Qty: 14 TABLET | Refills: 0 | Status: SHIPPED | OUTPATIENT
Start: 2024-10-23 | End: 2024-10-30

## 2024-10-23 ASSESSMENT — PAIN - FUNCTIONAL ASSESSMENT
PAIN_FUNCTIONAL_ASSESSMENT: 0-10
PAIN_FUNCTIONAL_ASSESSMENT: 0-10

## 2024-10-23 ASSESSMENT — PAIN SCALES - GENERAL: PAINLEVEL_OUTOF10: 0 - NO PAIN

## 2024-10-23 NOTE — CARE PLAN
The patient's goals for the shift include Pain management    The clinical goals for the shift include VSS WNL      Problem: Pain  Goal: Takes deep breaths with improved pain control throughout the shift  Outcome: Progressing  Goal: Turns in bed with improved pain control throughout the shift  Outcome: Progressing  Goal: Walks with improved pain control throughout the shift  Outcome: Progressing  Goal: Performs ADL's with improved pain control throughout shift  Outcome: Progressing  Goal: Participates in PT with improved pain control throughout the shift  Outcome: Progressing  Goal: Free from opioid side effects throughout the shift  Outcome: Progressing  Goal: Free from acute confusion related to pain meds throughout the shift  Outcome: Progressing     Problem: Pain - Adult  Goal: Verbalizes/displays adequate comfort level or baseline comfort level  Outcome: Progressing     Problem: Safety - Adult  Goal: Free from fall injury  Outcome: Progressing     Problem: Discharge Planning  Goal: Discharge to home or other facility with appropriate resources  Outcome: Progressing     Problem: Chronic Conditions and Co-morbidities  Goal: Patient's chronic conditions and co-morbidity symptoms are monitored and maintained or improved  Outcome: Progressing     Problem: Fall/Injury  Goal: Not fall by end of shift  Outcome: Progressing  Goal: Be free from injury by end of the shift  Outcome: Progressing  Goal: Verbalize understanding of personal risk factors for fall in the hospital  Outcome: Progressing  Goal: Verbalize understanding of risk factor reduction measures to prevent injury from fall in the home  Outcome: Progressing  Goal: Use assistive devices by end of the shift  Outcome: Progressing  Goal: Pace activities to prevent fatigue by end of the shift  Outcome: Progressing     Problem: Skin  Goal: Decreased wound size/increased tissue granulation at next dressing change  Outcome: Progressing  Goal: Participates in  plan/prevention/treatment measures  Outcome: Progressing  Goal: Prevent/manage excess moisture  Outcome: Progressing  Goal: Prevent/minimize sheer/friction injuries  Outcome: Progressing  Goal: Promote/optimize nutrition  Outcome: Progressing  Goal: Promote skin healing  Outcome: Progressing

## 2024-10-23 NOTE — DISCHARGE SUMMARY
Discharge Diagnosis  Prostate cancer (Multi)    Issues Requiring Follow-Up      Test Results Pending At Discharge  Pending Labs       No current pending labs.            Hospital Course  Domingo Greer is a 63 year old male with a significant pmh of HTN, HLD, afib s/p ablation 10/2022, remote provoked DVT, asthma, CLIVE (on CPAP), and GG3 PCa now s/p RALP with BPLND with Dr. Steele on 10/14. Patient presented to [place] ED 10/18 with subjective fever/chills, myalgia, and fatigue. Workup there showed leucocytosis. UA concerning for UTI, and CT with non-specific findings of bilateral fluid collections (favored to be hemostatic agent placed in LND bed during recent procedure). Patient was transferred to [facility] for urologic evaluation. Given his wife's similar prolonged GI symptoms and the lack of intraabdominal findings on CT scan, currently suspect an infectious colitis of some sort as the etiology of his illness. Patient to be discharged home today with 7 day antibiotics and follow up with Dr. Fajardo outpatient 11/21/24.     Pertinent Physical Exam At Time of Discharge  Physical Exam  General: resting comfortably in bed  Neuro: alert and oriented, no obvious focal deficit   Head/Neck: normocephalic, atraumatic  ENT: moist mucous membranes  CV: regular rate, normotenivve   Pulm: nonlabored breathing on room air, no conversational dyspnea  Abd: soft, mildly distended, tender, port site incisions with some superficial dehiscence and drainage through the wounds, diffuse red rash on bilateral lower quadrants remains stable  : voiding spontaneously  MSK: TERRY, no gross deformities  Psych: mood and behavior normal  Home Medications     Medication List      ASK your doctor about these medications     acetaminophen 500 mg tablet; Commonly known as: Tylenol; Take 2 tablets   (1,000 mg) by mouth every 6 hours if needed for mild pain (1 - 3).   albuterol 90 mcg/actuation inhaler   aspirin 81 mg EC tablet   carvedilol 25 mg  tablet; Commonly known as: Coreg; Take 1 tablet (25 mg)   by mouth 2 times daily (morning and late afternoon).   esomeprazole 20 mg DR capsule; Commonly known as: NexIUM; Take 1 capsule   (20 mg) by mouth once daily in the morning. Take before meals. Do not open   capsule.   furosemide 80 mg tablet; Commonly known as: Lasix; Take 1 tablet (80 mg)   by mouth once daily.   ketorolac 10 mg tablet; Commonly known as: Toradol; Take 1 tablet (10   mg) by mouth every 6 hours if needed for moderate pain (4 - 6).   losartan 25 mg tablet; Commonly known as: Cozaar; Take 1 tablet (25 mg)   by mouth once daily.   sennosides 8.6 mg tablet; Commonly known as: Senokot; Take 1 tablet (8.6   mg) by mouth once daily.       Outpatient Follow-Up  Future Appointments   Date Time Provider Department Center   11/21/2024  2:00 PM Maik Steele MD [facility]   1/6/2025  1:45 PM Venancio Brooks MD [id number]       Royer Coker MD

## 2024-10-23 NOTE — PROGRESS NOTES
Domingo Greer is a 63 y.o. male on day 4 of admission presenting with Prostate cancer (Multi).    Subjective   - No acute events overnight   - Patient denies abdominal pain, fever, chills, nausea, vomiting. He reports subjective improvement in his diarrhea.  - afebrile, VSS  - Labs unremarkable with stable Hgb WBC and Cr  - Stool pathogen analysis unremarkable      Objective     Physical Exam  General: resting comfortably in bed  Neuro: alert and oriented, no obvious focal deficit   Head/Neck: normocephalic, atraumatic  ENT: moist mucous membranes  CV: regular rate, normotenivve   Pulm: nonlabored breathing on room air, no conversational dyspnea  Abd: soft, mildly distended, tender, port site incisions with some superficial dehiscence and drainage through the wounds, diffuse red rash on bilateral lower quadrants remains stable  : voiding spontaneously  MSK: TERRY, no gross deformities  Psych: mood and behavior normal    Last Recorded Vitals  Blood pressure 147/71, pulse 72, temperature 36.7 °C (98.1 °F), temperature source Temporal, resp. rate 16, height 1.829 m (6'), weight 120 kg (265 lb), SpO2 100%.  Intake/Output last 3 Shifts:  I/O last 3 completed shifts:  In: 1605 (13.4 mL/kg) [P.O.:1605]  Out: - (0 mL/kg)   Weight: 120.2 kg     Relevant Results  Scheduled medications  aspirin, 81 mg, oral, Daily  carvedilol, 25 mg, oral, BID  ciprofloxacin, 500 mg, oral, q12h FELICE  furosemide, 80 mg, oral, Daily  heparin (porcine), 5,000 Units, subcutaneous, q8h  losartan, 25 mg, oral, Daily  metroNIDAZOLE, 500 mg, oral, q8h FELICE  pantoprazole, 20 mg, oral, Daily before breakfast      Continuous medications       PRN medications  PRN medications: albuterol, ondansetron **OR** ondansetron, oxyCODONE, oxyCODONE    Results for orders placed or performed during the hospital encounter of 10/19/24 (from the past 24 hours)   CBC   Result Value Ref Range    WBC 10.3 4.4 - 11.3 x10*3/uL    nRBC 0.0 0.0 - 0.0 /100 WBCs    RBC 4.39 (L)  4.50 - 5.90 x10*6/uL    Hemoglobin 14.5 13.5 - 17.5 g/dL    Hematocrit 43.5 41.0 - 52.0 %    MCV 99 80 - 100 fL    MCH 33.0 26.0 - 34.0 pg    MCHC 33.3 32.0 - 36.0 g/dL    RDW 12.3 11.5 - 14.5 %    Platelets 262 150 - 450 x10*3/uL   Renal function panel   Result Value Ref Range    Glucose 91 74 - 99 mg/dL    Sodium 139 136 - 145 mmol/L    Potassium 4.1 3.5 - 5.3 mmol/L    Chloride 104 98 - 107 mmol/L    Bicarbonate 27 21 - 32 mmol/L    Anion Gap 12 10 - 20 mmol/L    Urea Nitrogen 18 6 - 23 mg/dL    Creatinine 0.71 0.50 - 1.30 mg/dL    eGFR >90 >60 mL/min/1.73m*2    Calcium 8.6 8.6 - 10.6 mg/dL    Phosphorus 2.5 2.5 - 4.9 mg/dL    Albumin 3.7 3.4 - 5.0 g/dL       CT abdomen pelvis w IV contrast    Result Date: 10/18/2024  Interpreted By:  Wilner Pérez, STUDY: CT ABDOMEN PELVIS W IV CONTRAST;  10/18/2024 4:35 pm   INDICATION: Signs/Symptoms:fever, chills, abd pain, recent prostatectomy on 10/14.     COMPARISON: June 2, 2016 chest CT, December 20, 2023 MRI prostate, February 12, 2024 PET-CT   ACCESSION NUMBER(S): [id number]   ORDERING CLINICIAN: SANTIAGO FELDER   TECHNIQUE: CT of the abdomen and pelvis was performed.  Standard contiguous axial images were obtained at 3 mm slice thickness through the abdomen and pelvis. Coronal and sagittal reconstructions at 3 mm slice thickness were performed.  75 ML of Omnipaque 350 was administered intravenously without immediate complication.   FINDINGS: LOWER CHEST: Newly seen bandlike density left base with elevation of the adjacent hemidiaphragm favoring atelectasis.   ABDOMEN:   LIVER: The liver demonstrates homogeneous attenuation without evidence of focal liver lesions.   BILE DUCTS: Normal caliber.   GALLBLADDER: Status post cholecystectomy.   PANCREAS: The pancreas appears unremarkable without evidence of ductal dilatation or masses.   SPLEEN: The spleen is normal in size without focal lesions.   ADRENAL GLANDS: Within normal limits.   KIDNEYS AND URETERS: The kidneys  are normal in size and enhance symmetrically.  No hydroureteronephrosis or nephroureterolithiasis is identified.   PELVIS:   BLADDER: Decompressed by Madrigal catheter. Mural prominence likely related to underdistention. Trace gas within the lumen.   REPRODUCTIVE ORGANS: Status post prostatectomy with trace gas and minimal indistinctness of the fat at the operative fossa interposed between the urinary bladder and rectum sagittal image 5/83, expected postoperative appearance.   BOWEL: Moderate fluid within mildly prominent caliber proximal small bowel favoring postoperative ileus. No pneumatosis. The bowel demonstrates grossly uniform enhancement. Normal caliber appendix.   VESSELS: Scattered atherosclerosis. The principal central vessels are patent.   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 The right-sided collection 3.1 x 2.3 x 3.4 cm axial image 2/122 corresponding to estimated volume of 13 mL and the left-sided collection measures 2.9 x 2.0 x 2.8 cm image 2/123 corresponding to estimated volume of 8 mL. There are scattered gas bubbles within the peritoneal cavity compatible with tremor related gas.   BONES AND ABDOMINAL WALL: No suspicious osseous lesions are identified. Degenerative discogenic disease is noted in the lower thoracic and lumbar spine.  There is body wall edema most pronounced pelvic region. Minimal chamber related gas within the ventral body wall.   Similar prominent inguinal lymph nodes which may be reactive although nonspecific. Findings include 18 mm short axis right inguinal node image 2/157.   Similar small fat containing inguinal hernias containing extraperitoneal fat. There is minimal newly seen gas along the right medial spermatic cord image 2/159 likely related to recent surgery.       1.  Status post recent prostatectomy with treatment  related scattered gas and minimal fluid within the peritoneal cavity. 2. Bilateral retroperitoneal simple fluid collections and gas adjacent to the medial aspect of the external iliac vessels estimated at 13 mL on the right and 8 mL on the left. 3. Body wall edema and trace gas. 4. Probable postoperative ileus and likely postoperative subsegmental atelectasis left base. 5. Similar bilateral extraperitoneal small indirect fat containing inguinal hernias. 6. Similar prominent inguinal lymph nodes which may be reactive although nonspecific. Attention recommended at follow-up. 7. Newly seen minimal gas along the right sporadic cord likely related to recent surgery although attention to this region recommended on clinical assessment.     MACRO: None   Signed by: Wilner Pérez 10/18/2024 5:33 PM Dictation workstation:   [id number]                This patient has a urinary catheter   Reason for the urinary catheter remaining today? 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 Steele on 10/14. Patient presented to [place] ED 10/18 with subjective fever/chills, myalgia, and fatigue. Workup there showed leucocytosis. UA concerning for UTI, and CT with non-specific findings of bilateral fluid collections (favored to be hemostatic agent placed in LND bed during recent procedure). Patient was transferred to [facility] for urologic evaluation. Given his wife's similar prolonged GI symptoms and the lack of intraabdominal findings on CT scan, currently suspect an infectious colitis of some sort as the etiology of his illness.       Plan 10/23:  -ID consulted and signed off:   -Start 7-day course of oral ciprofloxacin + metronidazole.  -Discontinue oral  vancomycin, as C. Diff ruled out based on recent negative PCR.  -Discontinue IV metronidazole.  -Pause acetaminophen to monitor accurate temperatures.  -Continue hydration and current management by primary team for diarrhea.  - AM labs stable   -Patient likely to be discharged today.     Neuro:   -Multimodal pain control via scheduled tylenol, PRN oxy 5/10       CV:  -Monitor vitals  -Daily CBC  -Continue home ASA 81, carvedilol, losartan with hold parameters      Pulm:  -IS  -Wean O2  -Continue home albuterol     GI:  - regular diet, bowel regimen, PRN ondansetron       :  -Monitor I/O  -Daily BMP  -Continue home lasix 80mg daily       ID:   -Ucx negative, Bcx negative  -Managing for presumed c. Diff colitis with Flagyl and Vanc. (Continue PO vancomycin till 10/29)  -C diff 10/19 negative  -Stool pathogen panel 10/22 negative   - ID consult considering unclear etiology of intermittent fevers and persistent diarrhea     Prophy:  - SQH  - Pantoprazole     Dispo:  - RNF  - Possible DC today vs tomorrow     Patient seen with chief Dr. Nathan and discussed with attending Dr. Cedric Coker MD  [facility] [place]  Adult Urology Pager: [phone]  Pediatric Urology Pager: [phone]

## 2024-11-07 LAB
LAB AP ASR DISCLAIMER: NORMAL
LAB AP BLOCK FOR ADDITIONAL STUDIES: NORMAL
LABORATORY COMMENT REPORT: NORMAL
PATH REPORT.FINAL DX SPEC: NORMAL
PATH REPORT.GROSS SPEC: NORMAL
PATH REPORT.RELEVANT HX SPEC: NORMAL
PATH REPORT.TOTAL CANCER: NORMAL
PATHOLOGY SYNOPTIC REPORT: NORMAL
RESIDENT REVIEW: NORMAL

## 2024-11-07 PROCEDURE — 88344 IMHCHEM/IMCYTCHM EA MLT ANTB: CPT | Performed by: STUDENT IN AN ORGANIZED HEALTH CARE EDUCATION/TRAINING PROGRAM

## 2024-11-21 ENCOUNTER — OFFICE VISIT (OUTPATIENT)
Dept: UROLOGY | Facility: HOSPITAL | Age: 64
End: 2024-11-21
Payer: COMMERCIAL

## 2024-11-21 VITALS — HEART RATE: 77 BPM | DIASTOLIC BLOOD PRESSURE: 76 MMHG | SYSTOLIC BLOOD PRESSURE: 153 MMHG

## 2024-11-21 DIAGNOSIS — N40.1 BENIGN PROSTATIC HYPERPLASIA WITH LOWER URINARY TRACT SYMPTOMS, SYMPTOM DETAILS UNSPECIFIED: ICD-10-CM

## 2024-11-21 DIAGNOSIS — R19.7 DIARRHEA, UNSPECIFIED TYPE: ICD-10-CM

## 2024-11-21 DIAGNOSIS — C61 MALIGNANT NEOPLASM OF PROSTATE (MULTI): ICD-10-CM

## 2024-11-21 DIAGNOSIS — Z90.79 S/P PROSTATECTOMY: ICD-10-CM

## 2024-11-21 PROCEDURE — 99211 OFF/OP EST MAY X REQ PHY/QHP: CPT | Performed by: UROLOGY

## 2024-11-21 PROCEDURE — 1036F TOBACCO NON-USER: CPT | Performed by: UROLOGY

## 2024-11-21 NOTE — PROGRESS NOTES
FUV    Last Visit 8/24    HISTORY OF PRESENT ILLNESS:   Domingo Greer is a 63 y.o. male who is being seen today for POV s/p RALP + BPLND on 10/14/24  Kindly referred by Dr Gaspar    PAST MEDICAL HISTORY:  Past Medical History:   Diagnosis Date    Arrhythmia     s/p RFA Oct 2022 with Dr. Yeager    Asthma     BPH (benign prostatic hyperplasia)     Cellulitis, unspecified     Cellulitis    Dilated cardiomyopathy (Multi)     DVT (deep venous thrombosis) (Multi)     multiple, several years ago, s/p DOAC    Hyperlipidemia     Hypertension     Low testosterone in male     Peripheral vascular disease (CMS-HCC)     Prostate cancer (Multi)     Seizure (Multi)     in the setting of fever/infection    Sleep apnea     CPAP    Venous insufficiency of both lower extremities     Venous ulcer (Multi)     right foot   - Prostate MRI on 12/7/23 demonstrated 0.9cm left paramedial peripheral zone lesion at the midgland, which demonstrates diffusion restriction and early focal enhancement (PI-RADS 4). No gross extracapsular extension. No pelvic lymphadenopathy.  - Adore score 4+3=7, Grade Group 3, involving 5 of 5 fragmented cores and approximately 95% of the overall specimen dx on 1/5/24  - He does have low testosterone and has been on HRT for the last 13 years.  - Prior hx of laparoscopic gall bladder removal.     PAST SURGICAL HISTORY:  Past Surgical History:   Procedure Laterality Date    ANKLE SURGERY Right 11/13/2013    Ankle Surgery    CARDIAC CATHETERIZATION      CARDIAC ELECTROPHYSIOLOGY MAPPING AND ABLATION      for afib    CHOLECYSTECTOMY  11/13/2013    Cholecystectomy    COLONOSCOPY      OTHER SURGICAL HISTORY  09/23/2022    Vascular surgical procedure    PROSTATE BIOPSY  01/2024    PROSTATECTOMY N/A 10/14/2024    robotic radical prostatectomy with BPLND    ROTATOR CUFF REPAIR Bilateral 11/13/2013    Rotator Cuff Repair    WISDOM TOOTH EXTRACTION          ALLERGIES:   Allergies   Allergen Reactions    Other Anaphylaxis  "    Bee sting    Penicillin Anaphylaxis     per family history-2 brothers    Bee Venom Protein (Honey Bee) Unknown        MEDICATIONS:   Current Outpatient Medications   Medication Instructions    acetaminophen (TYLENOL) 1,000 mg, oral, Every 6 hours PRN    albuterol 90 mcg/actuation inhaler Inhale 1 puff every 6 hours if needed for wheezing or shortness of breath.    aspirin 81 mg EC tablet 1 tablet, Daily    carvedilol (COREG) 25 mg, oral, 2 times daily (morning and late afternoon)    esomeprazole (NEXIUM) 20 mg, oral, Daily before breakfast, Do not open capsule.    furosemide (LASIX) 80 mg, oral, Daily    ketorolac (TORADOL) 10 mg, oral, Every 6 hours PRN    losartan (COZAAR) 25 mg, oral, Daily    sennosides (SENOKOT) 8.6 mg, oral, Daily        PHYSICAL EXAM:  Blood pressure 153/76, pulse 77.  Constitutional: Patient appears well-developed and well-nourished. No distress.    Pulmonary/Chest: Effort normal. No respiratory distress.   Abdominal: Soft, ND NT  : WNL  Musculoskeletal: Normal range of motion.    Neurological: Alert and oriented to person, place, and time.  Psychiatric: Normal mood and affect. Behavior is normal. Thought content normal.      Labs:  No results found for: \"TESTOSTERONE\"  Lab Results   Component Value Date    PSA 7.82 (H) 06/03/2024     No components found for: \"CBC\"  Lab Results   Component Value Date    CREATININE 0.71 10/23/2024     No components found for: \"TESTOTMS\"  No results found for: \"TESTF\"    Imaging:   - MRI of prostate on 12/7/23 demonstrated 0.9cm PI RADS 4 lesions in the left paramedial peripheral zone lesion at the midgland, which demonstrates diffusion restriction and early focal enhancement.  - PSMA PET SCAN on 2/12/24 demonstrated there is no evidence for Ga68 PSMA avid pelvic lymph node  metastasis. There is no evidence for Ga68 PSMA avid distant metastatic disease.  Path showed prostatic adenocarcinoma with negative margins and negative lymph nodes    Discussion: "   Patient is s/p RALP + BPLND on 10/14/24. Path reviewed. His appetite and bowel movements are returning to baseline. Developed diarrhea but getting better. Will refer him to a GI doctor to rule out diverticulitis. Denies hematuria, dysuria, nocturia, flank pain, and bothersome frequency or urgency. Denies f/c/n/v. He is occasionally dribbling and changes a pad sometimes at night. He is able to generate a stream. Goes through 1-2 pads daily. Pt reassured his sx will improve. For ED, will try daily dosing Cialis 5 mg, denies nitrates. Discussed he can titrate Cialis up to 20 mg, but hold the 5 mg the next day. Patient is aware he cannot take both Cialis and Sildenafil at the same time.      Assessment:      1. Benign prostatic hyperplasia with lower urinary tract symptoms, symptom details unspecified  Measure post void residual      2. Malignant neoplasm of prostate (Multi)  PSA          Domingo Greer is a 63 y.o. male here for POV     Plan:   Refer to GI specialist to rule out diverticulitis with ongoing diarrhea   Will get a CBC and PSA next week  Follow up in 3 months with another PSA prior  All questions and concerns were addressed. Patient verbalizes understanding and has no other questions at this time.     Scribe Attestation  By signing my name below, ILisa Scribe   attest that this documentation has been prepared under the direction and in the presence of Maik Steele MD.

## 2024-11-25 ENCOUNTER — LAB (OUTPATIENT)
Dept: LAB | Facility: LAB | Age: 64
End: 2024-11-25
Payer: COMMERCIAL

## 2024-11-25 DIAGNOSIS — Z90.79 S/P PROSTATECTOMY: ICD-10-CM

## 2024-11-25 DIAGNOSIS — C61 MALIGNANT NEOPLASM OF PROSTATE (MULTI): ICD-10-CM

## 2024-11-25 LAB
ERYTHROCYTE [DISTWIDTH] IN BLOOD BY AUTOMATED COUNT: 13.2 % (ref 11.5–14.5)
HCT VFR BLD AUTO: 43.2 % (ref 41–52)
HGB BLD-MCNC: 14.4 G/DL (ref 13.5–17.5)
MCH RBC QN AUTO: 31.5 PG (ref 26–34)
MCHC RBC AUTO-ENTMCNC: 33.3 G/DL (ref 32–36)
MCV RBC AUTO: 95 FL (ref 80–100)
NRBC BLD-RTO: 0 /100 WBCS (ref 0–0)
PLATELET # BLD AUTO: 254 X10*3/UL (ref 150–450)
PSA SERPL-MCNC: 0.01 NG/ML
RBC # BLD AUTO: 4.57 X10*6/UL (ref 4.5–5.9)
WBC # BLD AUTO: 6.2 X10*3/UL (ref 4.4–11.3)

## 2024-11-25 PROCEDURE — 84153 ASSAY OF PSA TOTAL: CPT

## 2024-11-25 PROCEDURE — 36415 COLL VENOUS BLD VENIPUNCTURE: CPT

## 2024-11-25 PROCEDURE — 85027 COMPLETE CBC AUTOMATED: CPT

## 2024-11-27 DIAGNOSIS — Z90.79 S/P PROSTATECTOMY: Primary | ICD-10-CM

## 2024-11-28 RX ORDER — TADALAFIL 5 MG/1
5 TABLET ORAL DAILY
Qty: 90 TABLET | Refills: 3 | Status: SHIPPED | OUTPATIENT
Start: 2024-11-28 | End: 2025-11-23

## 2024-12-04 ENCOUNTER — TELEPHONE (OUTPATIENT)
Dept: CARDIOLOGY | Facility: HOSPITAL | Age: 64
End: 2024-12-04
Payer: COMMERCIAL

## 2024-12-04 NOTE — TELEPHONE ENCOUNTER
RN called pt at this time regarding SOB with activity, he states he is feeling fine now, no swelling or edema, no CP either. He wants to see if he can come in earlier to be seen. RN tasked the front to get him rescheduled.

## 2024-12-05 DIAGNOSIS — I42.0 DILATED CARDIOMYOPATHY (MULTI): ICD-10-CM

## 2024-12-05 RX ORDER — LOSARTAN POTASSIUM 25 MG/1
25 TABLET ORAL DAILY
Qty: 90 TABLET | Refills: 2 | Status: SHIPPED | OUTPATIENT
Start: 2024-12-05 | End: 2024-12-06 | Stop reason: SDUPTHER

## 2024-12-06 ENCOUNTER — OFFICE VISIT (OUTPATIENT)
Dept: CARDIOLOGY | Facility: HOSPITAL | Age: 64
End: 2024-12-06
Payer: COMMERCIAL

## 2024-12-06 VITALS
SYSTOLIC BLOOD PRESSURE: 160 MMHG | DIASTOLIC BLOOD PRESSURE: 100 MMHG | HEIGHT: 72 IN | WEIGHT: 266 LBS | HEART RATE: 71 BPM | BODY MASS INDEX: 36.03 KG/M2

## 2024-12-06 DIAGNOSIS — I42.0 DILATED CARDIOMYOPATHY (MULTI): ICD-10-CM

## 2024-12-06 DIAGNOSIS — I48.0 PAROXYSMAL ATRIAL FIBRILLATION (MULTI): ICD-10-CM

## 2024-12-06 DIAGNOSIS — I50.32 HEART FAILURE WITH IMPROVED EJECTION FRACTION (HFIMPEF): ICD-10-CM

## 2024-12-06 LAB
ATRIAL RATE: 71 BPM
P AXIS: 65 DEGREES
P OFFSET: 143 MS
P ONSET: 105 MS
PR INTERVAL: 204 MS
Q ONSET: 207 MS
QRS COUNT: 12 BEATS
QRS DURATION: 100 MS
QT INTERVAL: 412 MS
QTC CALCULATION(BAZETT): 447 MS
QTC FREDERICIA: 436 MS
R AXIS: -41 DEGREES
T AXIS: 25 DEGREES
T OFFSET: 413 MS
VENTRICULAR RATE: 71 BPM

## 2024-12-06 PROCEDURE — 93010 ELECTROCARDIOGRAM REPORT: CPT | Performed by: INTERNAL MEDICINE

## 2024-12-06 PROCEDURE — 3008F BODY MASS INDEX DOCD: CPT | Performed by: INTERNAL MEDICINE

## 2024-12-06 PROCEDURE — 99213 OFFICE O/P EST LOW 20 MIN: CPT | Mod: 25 | Performed by: INTERNAL MEDICINE

## 2024-12-06 PROCEDURE — 93005 ELECTROCARDIOGRAM TRACING: CPT | Performed by: INTERNAL MEDICINE

## 2024-12-06 PROCEDURE — 99213 OFFICE O/P EST LOW 20 MIN: CPT | Performed by: INTERNAL MEDICINE

## 2024-12-06 RX ORDER — FUROSEMIDE 80 MG/1
80 TABLET ORAL DAILY
Qty: 90 TABLET | Refills: 2 | Status: SHIPPED | OUTPATIENT
Start: 2024-12-06 | End: 2025-09-02

## 2024-12-06 RX ORDER — LOSARTAN POTASSIUM 100 MG/1
100 TABLET ORAL DAILY
Qty: 90 TABLET | Refills: 3 | Status: SHIPPED | OUTPATIENT
Start: 2024-12-06 | End: 2025-12-06

## 2024-12-06 ASSESSMENT — ENCOUNTER SYMPTOMS: DYSPNEA ON EXERTION: 1

## 2024-12-06 NOTE — LETTER
December 6, 2024     Opal Presley, APRN-CNP  [address]  [place] [facility] [address]    Patient: Domingo Greer   YOB: 1960   Date of Visit: 12/6/2024       Dear FATUMA Lopez-CNP:    Thank you for referring Domingo Greer to me for evaluation. Below are my notes for this consultation.  If you have questions, please do not hesitate to call me. I look forward to following your patient along with you.       Sincerely,     Venancio Brooks MD      CC: No Recipients  ______________________________________________________________________________________    Counseling:  The patient was counseled regarding diagnostic results, instructions for management, risk factor reductions, prognosis, patient and family education, impressions, risks and benefits of treatment options and importance of compliance with treatment.      Chief Complaint:   The patient presents today for evaluation of exertional SOB.     History Of Present Illness:    Domingo Greer is a 63 year old male patient who presents today for evaluation of exertional SOB. 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 The patient reports SOB with routine daily activities of living. He indicates that he discontinued his furosemide approximately 1 month ago s/t urinary frequency and incontinence in the setting of prostate CA. The patient underwent prostatectomy on 10/14/2024, and was subsequently admitted to hospital with an infection. CXR performed at that time showed a small left pleural effusion with atelectasis. BP is elevated.       Last Recorded Vitals:  Vitals:    12/06/24 1346 12/06/24 1420   BP: (!) 168/120 (!) 160/100   BP Location: Left arm    Pulse: 71    Weight: 121 kg (266 lb)    Height: 1.829 m (6')        Past Surgical History:  He has a past surgical history that  includes Rotator cuff repair (Bilateral, 11/13/2013); Cholecystectomy (11/13/2013); Ankle surgery (Right, 11/13/2013); Other surgical history (09/23/2022); Cardiac electrophysiology mapping and ablation; Colonoscopy; Cardiac catheterization; [place] tooth extraction; Prostate biopsy (01/2024); and Prostatectomy (N/A, 10/14/2024).      Social History:  He reports that he has quit smoking. His smoking use included cigarettes. He has never used smokeless tobacco. He reports current alcohol use of about 2.0 standard drinks of alcohol per week. He reports that he does not use drugs.    Family History:  Family History   Problem Relation Name Age of Onset   • Hypertension Mother     • Prostate cancer Father       Allergies:  Other, Penicillin, and Bee venom protein (honey bee)    Outpatient Medications:  Current Outpatient Medications   Medication Instructions   • acetaminophen (TYLENOL) 1,000 mg, oral, Every 6 hours PRN   • albuterol 90 mcg/actuation inhaler Inhale 1 puff every 6 hours if needed for wheezing or shortness of breath.   • aspirin 81 mg EC tablet 1 tablet, Daily   • carvedilol (COREG) 25 mg, oral, 2 times daily (morning and late afternoon)   • esomeprazole (NEXIUM) 20 mg, oral, Daily before breakfast, Do not open capsule.   • furosemide (LASIX) 80 mg, oral, Daily   • ketorolac (TORADOL) 10 mg, oral, Every 6 hours PRN   • losartan (COZAAR) 100 mg, oral, Daily   • sennosides (SENOKOT) 8.6 mg, oral, Daily   • tadalafil (CIALIS) 5 mg, oral, Daily     Review of Systems   Cardiovascular:  Positive for dyspnea on exertion.   All other systems reviewed and are negative.     Physical Exam:  Constitutional:       Appearance: Healthy appearance. Not in distress.   Neck:      Vascular: No JVR. JVD normal.   Pulmonary:      Effort: Pulmonary effort is normal.      Breath sounds: Normal breath sounds. No wheezing. No rhonchi. No rales.   Chest:      Chest wall: Not tender to palpatation.   Cardiovascular:      PMI at left  midclavicular line. Normal rate. Regular rhythm. Normal S1. Normal S2.       Murmurs: There is no murmur.      No gallop.  No click. No rub.   Pulses:     Intact distal pulses.   Edema:     Peripheral edema absent.   Abdominal:      General: Bowel sounds are normal.      Palpations: Abdomen is soft.      Tenderness: There is no abdominal tenderness.   Musculoskeletal: Normal range of motion.         General: No tenderness. Skin:     General: Skin is warm and dry.   Neurological:      General: No focal deficit present.      Mental Status: Alert and oriented to person, place and time.       Last Labs:  CBC -  Lab Results   Component Value Date    WBC 6.2 11/25/2024    HGB 14.4 11/25/2024    HCT 43.2 11/25/2024    MCV 95 11/25/2024     11/25/2024       CMP -  Lab Results   Component Value Date    CALCIUM 8.6 10/23/2024    PHOS 2.5 10/23/2024    PROT 6.7 10/18/2024    ALBUMIN 3.7 10/23/2024    AST 30 10/18/2024    ALT 37 10/18/2024    ALKPHOS 45 10/18/2024    BILITOT 1.3 (H) 10/18/2024       LIPID PANEL -   Lab Results   Component Value Date    CHOL 190 01/08/2024    TRIG 115 01/08/2024    HDL 45.6 01/08/2024    CHHDL 4.2 01/08/2024    LDLF 97 06/23/2022    VLDL 23 01/08/2024    NHDL 144 01/08/2024       RENAL FUNCTION PANEL -   Lab Results   Component Value Date    GLUCOSE 91 10/23/2024     10/23/2024    K 4.1 10/23/2024     10/23/2024    CO2 27 10/23/2024    ANIONGAP 12 10/23/2024    BUN 18 10/23/2024    CREATININE 0.71 10/23/2024    GFRMALE 83 06/01/2023    CALCIUM 8.6 10/23/2024    PHOS 2.5 10/23/2024    ALBUMIN 3.7 10/23/2024        Lab Results   Component Value Date    BNP 38 01/08/2024    HGBA1C 5.8 (A) 06/23/2022       Last Cardiology Tests:  11/09/2023 - TTE  Left ventricular systolic function is normal with a 55% estimated ejection fraction.     09/25/2023 - TTE  1. Left ventricular systolic function is mildly decreased with a 45-50% estimated ejection fraction.  2. Moderately increased left  ventricular septal thickness.  3. Left Ventricular Global Longitudinal Strain - 12.8 %.    10/21/2022 - Electrophysiology Procedure   RFA.    07/29/2022 - Cardiac Catheterization (LH/RH)  1. No evidence of coronary artery disease.  2. Left Ventricular end-diastolic pressure = 16.  3. Normal LV filling pressures.    07/18/2022 - TTE  1. The left ventricular systolic function is severely decreased with a 30-35% estimated ejection fraction.  2. Severely enlarged right ventricle.  3. The left atrium is moderate to severely dilated.  4. The right atrium is moderately dilated.  5. Mildly elevated RVSP.  6. Mild aortic valve stenosis.  7. The left ventricular cavity size is moderately dilated.  8. There is global hypokinesis of the left ventricle with minor regional variations.     Lab review: I have personally reviewed the laboratory result(s).     Assessment/Plan  1) Atrial Fibrillation s/p RFA Oct 2022  On ASA 81 mg daily, carvedilol 25 mg BID  Xarelto previously discontinued as patient participates in high risk activities such as car racing, motorcycle and mountain bike riding.  ? rate related CMP  TTE 11/09/2023 with LVEF 55% - improved from 45-50%   In NSR  Continue current medical Rx     2) Chronic Systolic Heart Failure  On ASA 81 mg daily, carvedilol 25 mg BID, losartan 25 mg daily   Appears compensated on exam  Left heart cath with no significant CAD July 2022  If LVEF remains <35%, EP to consider ICD   TTE 11/09/2023 with LVEF 55% - improved from 45-50%   Reports SOB with routine daily activities of living  Discontinued furosemide approx. 1 month ago s/t urinary frequency and incontinence in the setting of prostate CA.   Underwent prostatectomy on 10/14/2024  Subsequently admitted to hospital with infection  Inpatient CXR showed small left pleural effusion with atelectasis  BP elevated   Restart Lasix 80 mg daily   Increase losartan to 100 mg daily   Continue all other medications as prescribed  Check BMP and  BNP in 1 week  Check CXR  Check Echo  F/U after testing       Scribe Attestation  By signing my name below, I, Mikhail Marie   attest that this documentation has been prepared under the direction and in the presence of Venancio Brooks MD.

## 2024-12-06 NOTE — PATIENT INSTRUCTIONS
Restart Lasix. A prescription has been sent to your pharmacy.    Increase losartan to 100 mg daily. If you have any of the 25 mg tablets left, you can take 4 tablets once daily until they are finished. A prescription for the new dose has been sent to your pharmacy.  Continue all other medications as prescribed.  Please have repeat blood work in 1 week.  Dr. Brooks has ordered an echocardiogram (ultrasound of the heart) to followup on your heart function and structure.  Dr. Brooks has also ordered a repeat chest x-ray.  Followup with Dr. rBooks after the above tests.    If you have any questions or cardiac concerns, please call our office at [phone].

## 2024-12-06 NOTE — PROGRESS NOTES
Counseling:  The patient was counseled regarding diagnostic results, instructions for management, risk factor reductions, prognosis, patient and family education, impressions, risks and benefits of treatment options and importance of compliance with treatment.      Chief Complaint:   The patient presents today for evaluation of exertional SOB.     History Of Present Illness:    Domingo Greer is a 63 year old male patient who presents today for evaluation of exertional SOB. 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 The patient reports SOB with routine daily activities of living. He indicates that he discontinued his furosemide approximately 1 month ago s/t urinary frequency and incontinence in the setting of prostate CA. The patient underwent prostatectomy on 10/14/2024, and was subsequently admitted to hospital with an infection. CXR performed at that time showed a small left pleural effusion with atelectasis. BP is elevated.       Last Recorded Vitals:  Vitals:    12/06/24 1346 12/06/24 1420   BP: (!) 168/120 (!) 160/100   BP Location: Left arm    Pulse: 71    Weight: 121 kg (266 lb)    Height: 1.829 m (6')        Past Surgical History:  He has a past surgical history that includes Rotator cuff repair (Bilateral, 11/13/2013); Cholecystectomy (11/13/2013); Ankle surgery (Right, 11/13/2013); Other surgical history (09/23/2022); Cardiac electrophysiology mapping and ablation; Colonoscopy; Cardiac catheterization; [place] tooth extraction; Prostate biopsy (01/2024); and Prostatectomy (N/A, 10/14/2024).      Social History:  He reports that he has quit smoking. His smoking use included cigarettes. He has never used smokeless tobacco. He reports current alcohol use of about 2.0 standard drinks of alcohol per week. He reports that he does not use drugs.    Family History:  Family History   Problem  Relation Name Age of Onset    Hypertension Mother      Prostate cancer Father       Allergies:  Other, Penicillin, and Bee venom protein (honey bee)    Outpatient Medications:  Current Outpatient Medications   Medication Instructions    acetaminophen (TYLENOL) 1,000 mg, oral, Every 6 hours PRN    albuterol 90 mcg/actuation inhaler Inhale 1 puff every 6 hours if needed for wheezing or shortness of breath.    aspirin 81 mg EC tablet 1 tablet, Daily    carvedilol (COREG) 25 mg, oral, 2 times daily (morning and late afternoon)    esomeprazole (NEXIUM) 20 mg, oral, Daily before breakfast, Do not open capsule.    furosemide (LASIX) 80 mg, oral, Daily    ketorolac (TORADOL) 10 mg, oral, Every 6 hours PRN    losartan (COZAAR) 100 mg, oral, Daily    sennosides (SENOKOT) 8.6 mg, oral, Daily    tadalafil (CIALIS) 5 mg, oral, Daily     Review of Systems   Cardiovascular:  Positive for dyspnea on exertion.   All other systems reviewed and are negative.     Physical Exam:  Constitutional:       Appearance: Healthy appearance. Not in distress.   Neck:      Vascular: No JVR. JVD normal.   Pulmonary:      Effort: Pulmonary effort is normal.      Breath sounds: Normal breath sounds. No wheezing. No rhonchi. No rales.   Chest:      Chest wall: Not tender to palpatation.   Cardiovascular:      PMI at left midclavicular line. Normal rate. Regular rhythm. Normal S1. Normal S2.       Murmurs: There is no murmur.      No gallop.  No click. No rub.   Pulses:     Intact distal pulses.   Edema:     Peripheral edema absent.   Abdominal:      General: Bowel sounds are normal.      Palpations: Abdomen is soft.      Tenderness: There is no abdominal tenderness.   Musculoskeletal: Normal range of motion.         General: No tenderness. Skin:     General: Skin is warm and dry.   Neurological:      General: No focal deficit present.      Mental Status: Alert and oriented to person, place and time.       Last Labs:  CBC -  Lab Results   Component  Value Date    WBC 6.2 11/25/2024    HGB 14.4 11/25/2024    HCT 43.2 11/25/2024    MCV 95 11/25/2024     11/25/2024       CMP -  Lab Results   Component Value Date    CALCIUM 8.6 10/23/2024    PHOS 2.5 10/23/2024    PROT 6.7 10/18/2024    ALBUMIN 3.7 10/23/2024    AST 30 10/18/2024    ALT 37 10/18/2024    ALKPHOS 45 10/18/2024    BILITOT 1.3 (H) 10/18/2024       LIPID PANEL -   Lab Results   Component Value Date    CHOL 190 01/08/2024    TRIG 115 01/08/2024    HDL 45.6 01/08/2024    CHHDL 4.2 01/08/2024    LDLF 97 06/23/2022    VLDL 23 01/08/2024    NHDL 144 01/08/2024       RENAL FUNCTION PANEL -   Lab Results   Component Value Date    GLUCOSE 91 10/23/2024     10/23/2024    K 4.1 10/23/2024     10/23/2024    CO2 27 10/23/2024    ANIONGAP 12 10/23/2024    BUN 18 10/23/2024    CREATININE 0.71 10/23/2024    GFRMALE 83 06/01/2023    CALCIUM 8.6 10/23/2024    PHOS 2.5 10/23/2024    ALBUMIN 3.7 10/23/2024        Lab Results   Component Value Date    BNP 38 01/08/2024    HGBA1C 5.8 (A) 06/23/2022       Last Cardiology Tests:  11/09/2023 - TTE  Left ventricular systolic function is normal with a 55% estimated ejection fraction.     09/25/2023 - TTE  1. Left ventricular systolic function is mildly decreased with a 45-50% estimated ejection fraction.  2. Moderately increased left ventricular septal thickness.  3. Left Ventricular Global Longitudinal Strain - 12.8 %.    10/21/2022 - Electrophysiology Procedure   RFA.    07/29/2022 - Cardiac Catheterization (LH/RH)  1. No evidence of coronary artery disease.  2. Left Ventricular end-diastolic pressure = 16.  3. Normal LV filling pressures.    07/18/2022 - TTE  1. The left ventricular systolic function is severely decreased with a 30-35% estimated ejection fraction.  2. Severely enlarged right ventricle.  3. The left atrium is moderate to severely dilated.  4. The right atrium is moderately dilated.  5. Mildly elevated RVSP.  6. Mild aortic valve  stenosis.  7. The left ventricular cavity size is moderately dilated.  8. There is global hypokinesis of the left ventricle with minor regional variations.     Lab review: I have personally reviewed the laboratory result(s).     Assessment/Plan   1) Atrial Fibrillation s/p RFA Oct 2022  On ASA 81 mg daily, carvedilol 25 mg BID  Xarelto previously discontinued as patient participates in high risk activities such as car racing, motorcycle and mountain bike riding.  ? rate related CMP  TTE 11/09/2023 with LVEF 55% - improved from 45-50%   In NSR  Continue current medical Rx     2) Chronic Systolic Heart Failure  On ASA 81 mg daily, carvedilol 25 mg BID, losartan 25 mg daily   Appears compensated on exam  Left heart cath with no significant CAD July 2022  If LVEF remains <35%, EP to consider ICD   TTE 11/09/2023 with LVEF 55% - improved from 45-50%   Reports SOB with routine daily activities of living  Discontinued furosemide approx. 1 month ago s/t urinary frequency and incontinence in the setting of prostate CA.   Underwent prostatectomy on 10/14/2024  Subsequently admitted to hospital with infection  Inpatient CXR showed small left pleural effusion with atelectasis  BP elevated   Restart Lasix 80 mg daily   Increase losartan to 100 mg daily   Continue all other medications as prescribed  Check BMP and BNP in 1 week  Check CXR  Check Echo  F/U after testing       Scribe Attestation  By signing my name below, I, Mikhail Marie   attest that this documentation has been prepared under the direction and in the presence of Venancio Brooks MD.

## 2024-12-11 ENCOUNTER — LAB (OUTPATIENT)
Dept: LAB | Facility: LAB | Age: 64
End: 2024-12-11
Payer: COMMERCIAL

## 2024-12-11 DIAGNOSIS — I50.32 HEART FAILURE WITH IMPROVED EJECTION FRACTION (HFIMPEF): ICD-10-CM

## 2024-12-11 DIAGNOSIS — I42.0 DILATED CARDIOMYOPATHY (MULTI): ICD-10-CM

## 2024-12-11 DIAGNOSIS — I48.0 PAROXYSMAL ATRIAL FIBRILLATION (MULTI): ICD-10-CM

## 2024-12-11 LAB
ANION GAP SERPL CALC-SCNC: 12 MMOL/L (ref 10–20)
BNP SERPL-MCNC: 23 PG/ML (ref 0–99)
BUN SERPL-MCNC: 40 MG/DL (ref 6–23)
CALCIUM SERPL-MCNC: 9.1 MG/DL (ref 8.6–10.3)
CHLORIDE SERPL-SCNC: 99 MMOL/L (ref 98–107)
CO2 SERPL-SCNC: 30 MMOL/L (ref 21–32)
CREAT SERPL-MCNC: 1.33 MG/DL (ref 0.5–1.3)
EGFRCR SERPLBLD CKD-EPI 2021: 60 ML/MIN/1.73M*2
GLUCOSE SERPL-MCNC: 90 MG/DL (ref 74–99)
POTASSIUM SERPL-SCNC: 4.5 MMOL/L (ref 3.5–5.3)
SODIUM SERPL-SCNC: 136 MMOL/L (ref 136–145)

## 2024-12-11 PROCEDURE — 36415 COLL VENOUS BLD VENIPUNCTURE: CPT

## 2024-12-11 PROCEDURE — 80048 BASIC METABOLIC PNL TOTAL CA: CPT

## 2024-12-11 PROCEDURE — 83880 ASSAY OF NATRIURETIC PEPTIDE: CPT

## 2024-12-12 ENCOUNTER — TELEPHONE (OUTPATIENT)
Dept: CARDIOLOGY | Facility: HOSPITAL | Age: 64
End: 2024-12-12
Payer: COMMERCIAL

## 2024-12-12 DIAGNOSIS — I50.32 HEART FAILURE WITH IMPROVED EJECTION FRACTION (HFIMPEF): ICD-10-CM

## 2024-12-12 RX ORDER — FUROSEMIDE 40 MG/1
40 TABLET ORAL DAILY
Qty: 90 TABLET | Refills: 2 | Status: SHIPPED | OUTPATIENT
Start: 2024-12-12 | End: 2025-09-08

## 2024-12-12 NOTE — TELEPHONE ENCOUNTER
RN called pt at this time with results and plan. RN sent over orders at this time. Pt verbalized understanding.        ----- Message from Venancio Brooks sent at 12/11/2024  6:03 PM EST -----  Decrease Lasix to 40 mg . Repeat BMP in 2 weeks

## 2024-12-23 ENCOUNTER — LAB (OUTPATIENT)
Dept: LAB | Facility: LAB | Age: 64
End: 2024-12-23
Payer: COMMERCIAL

## 2024-12-23 ENCOUNTER — HOSPITAL ENCOUNTER (OUTPATIENT)
Dept: RADIOLOGY | Facility: HOSPITAL | Age: 64
Discharge: HOME | End: 2024-12-23
Payer: COMMERCIAL

## 2024-12-23 ENCOUNTER — HOSPITAL ENCOUNTER (OUTPATIENT)
Dept: CARDIOLOGY | Facility: HOSPITAL | Age: 64
Discharge: HOME | End: 2024-12-23
Payer: COMMERCIAL

## 2024-12-23 DIAGNOSIS — I48.91 UNSPECIFIED ATRIAL FIBRILLATION (MULTI): ICD-10-CM

## 2024-12-23 DIAGNOSIS — I48.0 PAROXYSMAL ATRIAL FIBRILLATION (MULTI): ICD-10-CM

## 2024-12-23 DIAGNOSIS — I50.32 HEART FAILURE WITH IMPROVED EJECTION FRACTION (HFIMPEF): ICD-10-CM

## 2024-12-23 DIAGNOSIS — I42.0 DILATED CARDIOMYOPATHY (MULTI): ICD-10-CM

## 2024-12-23 LAB
ANION GAP SERPL CALC-SCNC: 10 MMOL/L (ref 10–20)
AORTIC VALVE MEAN GRADIENT: 16 MMHG
AORTIC VALVE PEAK VELOCITY: 2.38 M/S
AV PEAK GRADIENT: 23 MMHG
AVA (PEAK VEL): 2.05 CM2
AVA (VTI): 2.29 CM2
BUN SERPL-MCNC: 23 MG/DL (ref 6–23)
CALCIUM SERPL-MCNC: 8.9 MG/DL (ref 8.6–10.3)
CHLORIDE SERPL-SCNC: 100 MMOL/L (ref 98–107)
CO2 SERPL-SCNC: 31 MMOL/L (ref 21–32)
CREAT SERPL-MCNC: 1.07 MG/DL (ref 0.5–1.3)
EGFRCR SERPLBLD CKD-EPI 2021: 78 ML/MIN/1.73M*2
EJECTION FRACTION APICAL 4 CHAMBER: 54.9
EJECTION FRACTION: 55 %
GLUCOSE SERPL-MCNC: 86 MG/DL (ref 74–99)
LEFT ATRIUM VOLUME AREA LENGTH INDEX BSA: 38.5 ML/M2
LEFT VENTRICLE INTERNAL DIMENSION DIASTOLE: 5.66 CM (ref 3.5–6)
LEFT VENTRICULAR OUTFLOW TRACT DIAMETER: 2.17 CM
LV EJECTION FRACTION BIPLANE: 54 %
MITRAL VALVE E/A RATIO: 1.2
MITRAL VALVE E/E' RATIO: 6.34
POTASSIUM SERPL-SCNC: 4.5 MMOL/L (ref 3.5–5.3)
RIGHT VENTRICLE FREE WALL PEAK S': 18.15 CM/S
RIGHT VENTRICLE PEAK SYSTOLIC PRESSURE: 25.1 MMHG
SODIUM SERPL-SCNC: 136 MMOL/L (ref 136–145)
TRICUSPID ANNULAR PLANE SYSTOLIC EXCURSION: 2.4 CM

## 2024-12-23 PROCEDURE — 36415 COLL VENOUS BLD VENIPUNCTURE: CPT

## 2024-12-23 PROCEDURE — 71046 X-RAY EXAM CHEST 2 VIEWS: CPT | Performed by: RADIOLOGY

## 2024-12-23 PROCEDURE — 93306 TTE W/DOPPLER COMPLETE: CPT | Performed by: INTERNAL MEDICINE

## 2024-12-23 PROCEDURE — 71046 X-RAY EXAM CHEST 2 VIEWS: CPT

## 2024-12-23 PROCEDURE — 93306 TTE W/DOPPLER COMPLETE: CPT

## 2024-12-23 PROCEDURE — 80048 BASIC METABOLIC PNL TOTAL CA: CPT

## 2024-12-30 PROBLEM — I87.8 VENOUS STASIS OF BOTH LOWER EXTREMITIES: Status: ACTIVE | Noted: 2024-12-30

## 2024-12-30 PROBLEM — I83.10 VARICOSE VEINS OF LOWER EXTREMITY WITH INFLAMMATION: Status: ACTIVE | Noted: 2024-12-30

## 2024-12-30 PROBLEM — L97.309 CHRONIC ULCER OF ANKLE (MULTI): Status: ACTIVE | Noted: 2024-12-30

## 2025-01-04 NOTE — PROGRESS NOTES
Counseling:  The patient was counseled regarding diagnostic results, instructions for management, risk factor reductions, prognosis, patient and family education, impressions, risks and benefits of treatment options and importance of compliance with treatment.      Chief Complaint:   The patient presents today for 1-month followup of exertional SOB and HTN s/p echocardiogram and CXR.     History Of Present Illness:    Domingo Greer is a 64 year old male patient who presents today for 1-month followup of exertional SOB and HTN s/p echocardiogram and CXR. 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 On 12/23/2024, CXR  revealed cardiomegaly with mild pulmonary vascular congestion and diffuse interstitial prominence suggesting component of edema, mild patchy bibasilar infiltrates/atelectasis left greater than right and blunting costophrenic angles suggesting small effusions, mildly elevated left hemidiaphragm and mild prominence the aortic arch, and echocardiogram demonstrated an EF of 55%, normal RV systolic function, mild aortic stenosis, mild aortic regurgitation, moderately dilated IVC and moderately increased septal and mildly increased posterior left ventricular wall thickness. Today, the patient reports improvement in his SOB since starting Lasix. BP continues to be elevated.        Last Recorded Vitals:  Vitals:    01/06/25 1335   BP: 150/90   BP Location: Left arm   Pulse: 72   Weight: 122 kg (269 lb)   Height: 1.829 m (6')       Past Surgical History:  He has a past surgical history that includes Rotator cuff repair (Bilateral, 11/13/2013); Cholecystectomy (11/13/2013); Ankle surgery (Right, 11/13/2013); Other surgical history (09/23/2022); Cardiac electrophysiology mapping and ablation; Colonoscopy; Cardiac catheterization; [place] tooth extraction; Prostate biopsy (01/2024); and  Prostatectomy (N/A, 10/14/2024).      Social History:  He reports that he has quit smoking. His smoking use included cigarettes. He has never used smokeless tobacco. He reports current alcohol use of about 2.0 standard drinks of alcohol per week. He reports that he does not use drugs.    Family History:  Family History   Problem Relation Name Age of Onset    Hypertension Mother      Prostate cancer Father       Allergies:  Other, Penicillin, and Bee venom protein (honey bee)    Outpatient Medications:  Current Outpatient Medications   Medication Instructions    acetaminophen (TYLENOL) 1,000 mg, oral, Every 6 hours PRN    albuterol 90 mcg/actuation inhaler Inhale 1 puff every 6 hours if needed for wheezing or shortness of breath.    aspirin 81 mg EC tablet 1 tablet, Daily    carvedilol (COREG) 25 mg, oral, 2 times daily (morning and late afternoon)    esomeprazole (NEXIUM) 20 mg, oral, Daily before breakfast, Do not open capsule.    furosemide (LASIX) 40 mg, oral, Daily    ketorolac (TORADOL) 10 mg, oral, Every 6 hours PRN    losartan (COZAAR) 100 mg, oral, Daily    sennosides (SENOKOT) 8.6 mg, oral, Daily    tadalafil (CIALIS) 5 mg, oral, Daily     Review of Systems   Cardiovascular:  Positive for dyspnea on exertion (improved).   All other systems reviewed and are negative.     Physical Exam:  Constitutional:       Appearance: Healthy appearance. Not in distress.   Neck:      Vascular: No JVR. JVD normal.   Pulmonary:      Effort: Pulmonary effort is normal.      Breath sounds: Normal breath sounds. No wheezing. No rhonchi. No rales.   Chest:      Chest wall: Not tender to palpatation.   Cardiovascular:      PMI at left midclavicular line. Normal rate. Regular rhythm. Normal S1. Normal S2.       Murmurs: There is no murmur.      No gallop.  No click. No rub.   Pulses:     Intact distal pulses.   Edema:     Peripheral edema absent.   Abdominal:      General: Bowel sounds are normal.      Palpations: Abdomen is soft.       Tenderness: There is no abdominal tenderness.   Musculoskeletal: Normal range of motion.         General: No tenderness. Skin:     General: Skin is warm and dry.   Neurological:      General: No focal deficit present.      Mental Status: Alert and oriented to person, place and time.       Last Labs:  CBC -  Lab Results   Component Value Date    WBC 6.2 11/25/2024    HGB 14.4 11/25/2024    HCT 43.2 11/25/2024    MCV 95 11/25/2024     11/25/2024       CMP -  Lab Results   Component Value Date    CALCIUM 8.9 12/23/2024    PHOS 2.5 10/23/2024    PROT 6.7 10/18/2024    ALBUMIN 3.7 10/23/2024    AST 30 10/18/2024    ALT 37 10/18/2024    ALKPHOS 45 10/18/2024    BILITOT 1.3 (H) 10/18/2024       LIPID PANEL -   Lab Results   Component Value Date    CHOL 190 01/08/2024    TRIG 115 01/08/2024    HDL 45.6 01/08/2024    CHHDL 4.2 01/08/2024    LDLF 97 06/23/2022    VLDL 23 01/08/2024    NHDL 144 01/08/2024       RENAL FUNCTION PANEL -   Lab Results   Component Value Date    GLUCOSE 86 12/23/2024     12/23/2024    K 4.5 12/23/2024     12/23/2024    CO2 31 12/23/2024    ANIONGAP 10 12/23/2024    BUN 23 12/23/2024    CREATININE 1.07 12/23/2024    GFRMALE 83 06/01/2023    CALCIUM 8.9 12/23/2024    PHOS 2.5 10/23/2024    ALBUMIN 3.7 10/23/2024        Lab Results   Component Value Date    BNP 23 12/11/2024    HGBA1C 5.8 (A) 06/23/2022       Last Cardiology Tests:  12/23/2024 - CXR  1.  Cardiomegaly with mild pulmonary vascular congestion and diffuse interstitial prominence suggesting component of edema overall similar to prior. Mild patchy bibasilar infiltrates/atelectasis left greater than right and blunting costophrenic angles suggesting small effusions grossly similar to prior. Mildly elevated left hemidiaphragm. Continued clinical correlation and follow-up advised.  2. Mild prominence aortic arch similar to prior.    12/23/2024 - TTE  1. The left ventricular systolic function is normal, with a visually  estimated ejection fraction of 55%.  2. Spectral Doppler shows a normal pattern of left ventricular diastolic filling.  3. There is normal right ventricular global systolic function.  4. Moderately enlarged right ventricle.  5. The left atrium is moderately dilated.  6. Right ventricular systolic pressure is within normal limits.  7. Mild aortic valve stenosis.  8. Mild aortic valve regurgitation.  9. The inferior vena cava appears moderately dilated, with IVC inspiratory collapse greater than 50%.  10. There is moderately increased septal and mildly increased posterior left ventricular wall thickness.    11/09/2023 - TTE  Left ventricular systolic function is normal with a 55% estimated ejection fraction.     09/25/2023 - TTE  1. Left ventricular systolic function is mildly decreased with a 45-50% estimated ejection fraction.  2. Moderately increased left ventricular septal thickness.  3. Left Ventricular Global Longitudinal Strain - 12.8 %.    10/21/2022 - Electrophysiology Procedure   RFA.    07/29/2022 - Cardiac Catheterization (LH/RH)  1. No evidence of coronary artery disease.  2. Left Ventricular end-diastolic pressure = 16.  3. Normal LV filling pressures.    07/18/2022 - TTE  1. The left ventricular systolic function is severely decreased with a 30-35% estimated ejection fraction.  2. Severely enlarged right ventricle.  3. The left atrium is moderate to severely dilated.  4. The right atrium is moderately dilated.  5. Mildly elevated RVSP.  6. Mild aortic valve stenosis.  7. The left ventricular cavity size is moderately dilated.  8. There is global hypokinesis of the left ventricle with minor regional variations.     Lab review: I have personally reviewed the laboratory result(s).   Diagnostic review: I have personally reviewed the result(s) of the Echocardiogram and CXR.     Assessment/Plan   1) Atrial Fibrillation s/p RFA Oct 2022  On ASA 81 mg daily, carvedilol 25 mg BID  Xarelto previously discontinued  as patient participates in high risk activities such as car racing, motorcycle and mountain bike riding.  ? rate related CMP  TTE 11/09/2023 with LVEF 55% - improved from 45-50%   In NSR  Continue current medical Rx     2) Chronic Systolic Heart Failure - HTN  On ASA 81 mg daily, carvedilol 25 mg BID, losartan 100 mg daily, Lasix 40 mg daily   Appears compensated on exam  Left heart cath with no significant CAD July 2022  If LVEF remains <35%, EP to consider ICD   TTE 11/09/2023 with LVEF 55% - improved from 45-50%   Reports SOB with routine daily activities of living  Discontinued furosemide approx. 1 month ago s/t urinary frequency and incontinence in the setting of prostate CA.   Underwent prostatectomy on 10/14/2024  Subsequently admitted to hospital with infection  Inpatient CXR showed small left pleural effusion with atelectasis  BMP 12/11/2024 with BUN and creatinine of 40 and 1.33 respectively - Lasix decreased to 40 mg daily  CXR 12/23/2024 with cardiomegaly with mild pulmonary vascular congestion and diffuse interstitial prominence suggesting component of edema, mild patchy bibasilar infiltrates/atelectasis left greater than right and blunting costophrenic angles suggesting small effusions, mildly elevated left hemidiaphragm, mild prominence the aortic arch.  TTE 12/23/2024 with LVEF 55%, normal RV systolic function, mild aortic stenosis, mild aortic regurgitation, moderately dilated IVC and moderately increased septal, mildly increased posterior left ventricular wall thickness.  Repeat BMP 12/23/2024 with BUN and creatinine of 23 and 1.07  SOB improved with Lasix   BP persistently elevated   Start amlodipine 5 mg daily in the evening  Continue all other medications as prescribed - advised to take losartan in the morning   F/U 1 month      Scribe Attestation  By signing my name below, I, Mikhail Marie   attest that this documentation has been prepared under the direction and in the presence of  Venancio Brooks MD.

## 2025-01-06 ENCOUNTER — APPOINTMENT (OUTPATIENT)
Dept: CARDIOLOGY | Facility: CLINIC | Age: 65
End: 2025-01-06
Payer: COMMERCIAL

## 2025-01-06 VITALS
HEIGHT: 72 IN | DIASTOLIC BLOOD PRESSURE: 90 MMHG | BODY MASS INDEX: 36.44 KG/M2 | SYSTOLIC BLOOD PRESSURE: 150 MMHG | WEIGHT: 269 LBS | HEART RATE: 72 BPM

## 2025-01-06 DIAGNOSIS — I50.32 HEART FAILURE WITH IMPROVED EJECTION FRACTION (HFIMPEF): ICD-10-CM

## 2025-01-06 DIAGNOSIS — I48.0 PAROXYSMAL ATRIAL FIBRILLATION (MULTI): ICD-10-CM

## 2025-01-06 LAB
ATRIAL RATE: 72 BPM
P AXIS: 84 DEGREES
P OFFSET: 144 MS
P ONSET: 100 MS
PR INTERVAL: 214 MS
Q ONSET: 207 MS
QRS COUNT: 12 BEATS
QRS DURATION: 102 MS
QT INTERVAL: 392 MS
QTC CALCULATION(BAZETT): 429 MS
QTC FREDERICIA: 416 MS
R AXIS: -42 DEGREES
T AXIS: 30 DEGREES
T OFFSET: 403 MS
VENTRICULAR RATE: 72 BPM

## 2025-01-06 PROCEDURE — 3008F BODY MASS INDEX DOCD: CPT | Performed by: INTERNAL MEDICINE

## 2025-01-06 PROCEDURE — 99213 OFFICE O/P EST LOW 20 MIN: CPT | Performed by: INTERNAL MEDICINE

## 2025-01-06 PROCEDURE — 93010 ELECTROCARDIOGRAM REPORT: CPT | Performed by: INTERNAL MEDICINE

## 2025-01-06 PROCEDURE — 99213 OFFICE O/P EST LOW 20 MIN: CPT | Mod: 25 | Performed by: INTERNAL MEDICINE

## 2025-01-06 PROCEDURE — 93005 ELECTROCARDIOGRAM TRACING: CPT | Performed by: INTERNAL MEDICINE

## 2025-01-06 RX ORDER — AMLODIPINE BESYLATE 5 MG/1
5 TABLET ORAL DAILY
Qty: 90 TABLET | Refills: 3 | Status: SHIPPED | OUTPATIENT
Start: 2025-01-06 | End: 2026-01-06

## 2025-01-06 ASSESSMENT — ENCOUNTER SYMPTOMS: DYSPNEA ON EXERTION: 1

## 2025-01-06 NOTE — PATIENT INSTRUCTIONS
For your blood pressure, Dr. Brooks has prescribed amlodipine 5 mg at night. A prescription has been sent to your pharmacy.    Continue all other medications as prescribed. Please take the losartan in the morning.   Followup with Dr. Brooks in 1 month.    If you have any questions or cardiac concerns, please call our office at [phone].

## 2025-01-14 ENCOUNTER — APPOINTMENT (OUTPATIENT)
Dept: CARDIOLOGY | Facility: HOSPITAL | Age: 65
End: 2025-01-14
Payer: COMMERCIAL

## 2025-02-05 NOTE — PROGRESS NOTES
Counseling:  The patient was counseled regarding diagnostic results, instructions for management, risk factor reductions, prognosis, patient and family education, impressions, risks and benefits of treatment options and importance of compliance with treatment.      Chief Complaint:   The patient presents today for 1-month followup of HTN.     History Of Present Illness:    Domingo Greer is a 64 year old male patient who presents today for 1-month followup of HTN. 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 Today, the patient states that he is feeling well with improvement in his BP to 140/80 on average. He reports occasional exertional SOB.       Last Recorded Vitals:  Vitals:    02/06/25 1329   BP: 148/80   Pulse: 72   Weight: 122 kg (269 lb)   Height: 1.829 m (6')       Past Surgical History:  He has a past surgical history that includes Rotator cuff repair (Bilateral, 11/13/2013); Cholecystectomy (11/13/2013); Ankle surgery (Right, 11/13/2013); Other surgical history (09/23/2022); Cardiac electrophysiology mapping and ablation; Colonoscopy; Cardiac catheterization; [place] tooth extraction; Prostate biopsy (01/2024); and Prostatectomy (N/A, 10/14/2024).      Social History:  He reports that he has quit smoking. His smoking use included cigarettes. He has never used smokeless tobacco. He reports current alcohol use of about 2.0 standard drinks of alcohol per week. He reports that he does not use drugs.    Family History:  Family History   Problem Relation Name Age of Onset    Hypertension Mother      Prostate cancer Father       Allergies:  Other, Penicillin, and Bee venom protein (honey bee)    Outpatient Medications:  Current Outpatient Medications   Medication Instructions    albuterol 90 mcg/actuation inhaler Inhale 1 puff every 6 hours if needed for wheezing or shortness of breath.     amLODIPine (NORVASC) 5 mg, oral, Daily    aspirin 81 mg EC tablet 1 tablet, Daily    carvedilol (COREG) 25 mg, oral, 2 times daily (morning and late afternoon)    furosemide (LASIX) 40 mg, oral, Daily    losartan (COZAAR) 100 mg, oral, Daily    tadalafil (CIALIS) 5 mg, oral, Daily     Review of Systems   Cardiovascular:  Positive for dyspnea on exertion.   All other systems reviewed and are negative.     Physical Exam:  Constitutional:       Appearance: Healthy appearance. Not in distress.   Neck:      Vascular: No JVR. JVD normal.   Pulmonary:      Effort: Pulmonary effort is normal.      Breath sounds: Normal breath sounds. No wheezing. No rhonchi. No rales.   Chest:      Chest wall: Not tender to palpatation.   Cardiovascular:      PMI at left midclavicular line. Normal rate. Regular rhythm. Normal S1. Normal S2.       Murmurs: There is no murmur.      No gallop.  No click. No rub.   Pulses:     Intact distal pulses.   Edema:     Peripheral edema absent.   Abdominal:      General: Bowel sounds are normal.      Palpations: Abdomen is soft.      Tenderness: There is no abdominal tenderness.   Musculoskeletal: Normal range of motion.         General: No tenderness. Skin:     General: Skin is warm and dry.   Neurological:      General: No focal deficit present.      Mental Status: Alert and oriented to person, place and time.       Last Labs:  CBC -  Lab Results   Component Value Date    WBC 6.2 11/25/2024    HGB 14.4 11/25/2024    HCT 43.2 11/25/2024    MCV 95 11/25/2024     11/25/2024       CMP -  Lab Results   Component Value Date    CALCIUM 8.9 12/23/2024    PHOS 2.5 10/23/2024    PROT 6.7 10/18/2024    ALBUMIN 3.7 10/23/2024    AST 30 10/18/2024    ALT 37 10/18/2024    ALKPHOS 45 10/18/2024    BILITOT 1.3 (H) 10/18/2024       LIPID PANEL -   Lab Results   Component Value Date    CHOL 190 01/08/2024    TRIG 115 01/08/2024    HDL 45.6 01/08/2024    CHHDL 4.2 01/08/2024    LDLF 97 06/23/2022    VLDL 23  01/08/2024    NHDL 144 01/08/2024       RENAL FUNCTION PANEL -   Lab Results   Component Value Date    GLUCOSE 86 12/23/2024     12/23/2024    K 4.5 12/23/2024     12/23/2024    CO2 31 12/23/2024    ANIONGAP 10 12/23/2024    BUN 23 12/23/2024    CREATININE 1.07 12/23/2024    GFRMALE 83 06/01/2023    CALCIUM 8.9 12/23/2024    PHOS 2.5 10/23/2024    ALBUMIN 3.7 10/23/2024        Lab Results   Component Value Date    BNP 23 12/11/2024    HGBA1C 5.8 (A) 06/23/2022       Last Cardiology Tests:  12/23/2024 - CXR  1.  Cardiomegaly with mild pulmonary vascular congestion and diffuse interstitial prominence suggesting component of edema overall similar to prior. Mild patchy bibasilar infiltrates/atelectasis left greater than right and blunting costophrenic angles suggesting small effusions grossly similar to prior. Mildly elevated left hemidiaphragm. Continued clinical correlation and follow-up advised.  2. Mild prominence aortic arch similar to prior.    12/23/2024 - TTE  1. The left ventricular systolic function is normal, with a visually estimated ejection fraction of 55%.  2. Spectral Doppler shows a normal pattern of left ventricular diastolic filling.  3. There is normal right ventricular global systolic function.  4. Moderately enlarged right ventricle.  5. The left atrium is moderately dilated.  6. Right ventricular systolic pressure is within normal limits.  7. Mild aortic valve stenosis.  8. Mild aortic valve regurgitation.  9. The inferior vena cava appears moderately dilated, with IVC inspiratory collapse greater than 50%.  10. There is moderately increased septal and mildly increased posterior left ventricular wall thickness.    11/09/2023 - TTE  Left ventricular systolic function is normal with a 55% estimated ejection fraction.     09/25/2023 - TTE  1. Left ventricular systolic function is mildly decreased with a 45-50% estimated ejection fraction.  2. Moderately increased left ventricular septal  thickness.  3. Left Ventricular Global Longitudinal Strain - 12.8 %.    10/21/2022 - Electrophysiology Procedure   RFA.    07/29/2022 - Cardiac Catheterization (LH/RH)  1. No evidence of coronary artery disease.  2. Left Ventricular end-diastolic pressure = 16.  3. Normal LV filling pressures.    07/18/2022 - TTE  1. The left ventricular systolic function is severely decreased with a 30-35% estimated ejection fraction.  2. Severely enlarged right ventricle.  3. The left atrium is moderate to severely dilated.  4. The right atrium is moderately dilated.  5. Mildly elevated RVSP.  6. Mild aortic valve stenosis.  7. The left ventricular cavity size is moderately dilated.  8. There is global hypokinesis of the left ventricle with minor regional variations.     Lab review: I have personally reviewed the laboratory result(s).     Assessment/Plan   1) Atrial Fibrillation s/p RFA Oct 2022  On ASA 81 mg daily, carvedilol 25 mg BID  Xarelto previously discontinued as patient participates in high risk activities such as car racing, motorcycle and mountain bike riding.  ? rate related CMP  TTE 11/09/2023 with LVEF 55% - improved from 45-50%   In NSR  Continue current medical Rx   Followup with Erika Deng NP, in 6 months    2) Chronic Systolic Heart Failure - HTN  On ASA 81 mg daily, carvedilol 25 mg BID, losartan 100 mg daily in the a.m., Lasix 40 mg daily, amlodipine 5 mg daily.  Appears compensated on exam  Left heart cath with no significant CAD July 2022  If LVEF remains <35%, EP to consider ICD   TTE 11/09/2023 with LVEF 55% - improved from 45-50%   Underwent prostatectomy on 10/14/2024  Subsequently admitted to hospital with infection  Inpatient CXR showed small left pleural effusion with atelectasis  CXR 12/23/2024 with cardiomegaly with mild pulmonary vascular congestion and diffuse interstitial prominence suggesting component of edema, mild patchy bibasilar infiltrates/atelectasis left greater than right and  blunting costophrenic angles suggesting small effusions, mildly elevated left hemidiaphragm, mild prominence the aortic arch.  TTE 12/23/2024 with LVEF 55%, normal RV systolic function, mild aortic stenosis, mild aortic regurgitation, moderately dilated IVC and moderately increased septal, mildly increased posterior left ventricular wall thickness.  BP improved to 140/80 on average, per patient  Reports occasional exertional SOB  Continue current medical Rx   Check HR CT chest to r/o pulmonary fibrosis - call with results   Followup with Erika Deng NP, in 6 months      Scribe Attestation  By signing my name below, I, Mary Bowman, Scribe   attest that this documentation has been prepared under the direction and in the presence of Venancio Brooks MD.

## 2025-02-06 ENCOUNTER — OFFICE VISIT (OUTPATIENT)
Dept: CARDIOLOGY | Facility: HOSPITAL | Age: 65
End: 2025-02-06
Payer: COMMERCIAL

## 2025-02-06 VITALS
WEIGHT: 269 LBS | DIASTOLIC BLOOD PRESSURE: 80 MMHG | HEART RATE: 72 BPM | SYSTOLIC BLOOD PRESSURE: 148 MMHG | BODY MASS INDEX: 36.44 KG/M2 | HEIGHT: 72 IN

## 2025-02-06 DIAGNOSIS — I50.32 HEART FAILURE WITH IMPROVED EJECTION FRACTION (HFIMPEF): ICD-10-CM

## 2025-02-06 DIAGNOSIS — R06.02 SHORTNESS OF BREATH: ICD-10-CM

## 2025-02-06 DIAGNOSIS — I48.0 PAROXYSMAL ATRIAL FIBRILLATION (MULTI): Primary | ICD-10-CM

## 2025-02-06 DIAGNOSIS — R93.89 ABNORMAL CXR: ICD-10-CM

## 2025-02-06 LAB
P OFFSET: 157 MS
P ONSET: 130 MS
Q ONSET: 214 MS
QRS COUNT: 12 BEATS
QRS DURATION: 106 MS
QT INTERVAL: 396 MS
QTC CALCULATION(BAZETT): 433 MS
QTC FREDERICIA: 421 MS
R AXIS: -12 DEGREES
T AXIS: -20 DEGREES
T OFFSET: 412 MS
VENTRICULAR RATE: 72 BPM

## 2025-02-06 PROCEDURE — 93005 ELECTROCARDIOGRAM TRACING: CPT | Performed by: INTERNAL MEDICINE

## 2025-02-06 PROCEDURE — 1036F TOBACCO NON-USER: CPT | Performed by: INTERNAL MEDICINE

## 2025-02-06 PROCEDURE — 99213 OFFICE O/P EST LOW 20 MIN: CPT | Performed by: INTERNAL MEDICINE

## 2025-02-06 PROCEDURE — 99213 OFFICE O/P EST LOW 20 MIN: CPT | Mod: 25 | Performed by: INTERNAL MEDICINE

## 2025-02-06 PROCEDURE — 3008F BODY MASS INDEX DOCD: CPT | Performed by: INTERNAL MEDICINE

## 2025-02-06 ASSESSMENT — ENCOUNTER SYMPTOMS: DYSPNEA ON EXERTION: 1

## 2025-02-06 NOTE — LETTER
February 6, 2025     Opal Presley, APRN-CNP  [address]  [facility] [address]    Patient: Domingo Greer   YOB: 1960   Date of Visit: 2/6/2025       Dear FATUMA Lopez-CNP:    Thank you for referring Domingo Greer to me for evaluation. Below are my notes for this consultation.  If you have questions, please do not hesitate to call me. I look forward to following your patient along with you.       Sincerely,     Venancio Brooks MD      CC: No Recipients  ______________________________________________________________________________________    Counseling:  The patient was counseled regarding diagnostic results, instructions for management, risk factor reductions, prognosis, patient and family education, impressions, risks and benefits of treatment options and importance of compliance with treatment.      Chief Complaint:   The patient presents today for 1-month followup of HTN.     History Of Present Illness:    Domingo Greer is a 64 year old male patient who presents today for 1-month followup of HTN. 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 Today, the patient states that he is feeling well with improvement in his BP to 140/80 on average. He reports occasional exertional SOB.       Last Recorded Vitals:  Vitals:    02/06/25 1329   BP: 148/80   Pulse: 72   Weight: 122 kg (269 lb)   Height: 1.829 m (6')       Past Surgical History:  He has a past surgical history that includes Rotator cuff repair (Bilateral, 11/13/2013); Cholecystectomy (11/13/2013); Ankle surgery (Right, 11/13/2013); Other surgical history (09/23/2022); Cardiac electrophysiology mapping and ablation; Colonoscopy; Cardiac catheterization; [place] tooth extraction; Prostate biopsy (01/2024); and Prostatectomy (N/A, 10/14/2024).      Social History:  He  reports that he has quit smoking. His smoking use included cigarettes. He has never used smokeless tobacco. He reports current alcohol use of about 2.0 standard drinks of alcohol per week. He reports that he does not use drugs.    Family History:  Family History   Problem Relation Name Age of Onset   • Hypertension Mother     • Prostate cancer Father       Allergies:  Other, Penicillin, and Bee venom protein (honey bee)    Outpatient Medications:  Current Outpatient Medications   Medication Instructions   • albuterol 90 mcg/actuation inhaler Inhale 1 puff every 6 hours if needed for wheezing or shortness of breath.   • amLODIPine (NORVASC) 5 mg, oral, Daily   • aspirin 81 mg EC tablet 1 tablet, Daily   • carvedilol (COREG) 25 mg, oral, 2 times daily (morning and late afternoon)   • furosemide (LASIX) 40 mg, oral, Daily   • losartan (COZAAR) 100 mg, oral, Daily   • tadalafil (CIALIS) 5 mg, oral, Daily     Review of Systems   Cardiovascular:  Positive for dyspnea on exertion.   All other systems reviewed and are negative.     Physical Exam:  Constitutional:       Appearance: Healthy appearance. Not in distress.   Neck:      Vascular: No JVR. JVD normal.   Pulmonary:      Effort: Pulmonary effort is normal.      Breath sounds: Normal breath sounds. No wheezing. No rhonchi. No rales.   Chest:      Chest wall: Not tender to palpatation.   Cardiovascular:      PMI at left midclavicular line. Normal rate. Regular rhythm. Normal S1. Normal S2.       Murmurs: There is no murmur.      No gallop.  No click. No rub.   Pulses:     Intact distal pulses.   Edema:     Peripheral edema absent.   Abdominal:      General: Bowel sounds are normal.      Palpations: Abdomen is soft.      Tenderness: There is no abdominal tenderness.   Musculoskeletal: Normal range of motion.         General: No tenderness. Skin:     General: Skin is warm and dry.   Neurological:      General: No focal deficit present.      Mental Status: Alert and  oriented to person, place and time.       Last Labs:  CBC -  Lab Results   Component Value Date    WBC 6.2 11/25/2024    HGB 14.4 11/25/2024    HCT 43.2 11/25/2024    MCV 95 11/25/2024     11/25/2024       CMP -  Lab Results   Component Value Date    CALCIUM 8.9 12/23/2024    PHOS 2.5 10/23/2024    PROT 6.7 10/18/2024    ALBUMIN 3.7 10/23/2024    AST 30 10/18/2024    ALT 37 10/18/2024    ALKPHOS 45 10/18/2024    BILITOT 1.3 (H) 10/18/2024       LIPID PANEL -   Lab Results   Component Value Date    CHOL 190 01/08/2024    TRIG 115 01/08/2024    HDL 45.6 01/08/2024    CHHDL 4.2 01/08/2024    LDLF 97 06/23/2022    VLDL 23 01/08/2024    NHDL 144 01/08/2024       RENAL FUNCTION PANEL -   Lab Results   Component Value Date    GLUCOSE 86 12/23/2024     12/23/2024    K 4.5 12/23/2024     12/23/2024    CO2 31 12/23/2024    ANIONGAP 10 12/23/2024    BUN 23 12/23/2024    CREATININE 1.07 12/23/2024    GFRMALE 83 06/01/2023    CALCIUM 8.9 12/23/2024    PHOS 2.5 10/23/2024    ALBUMIN 3.7 10/23/2024        Lab Results   Component Value Date    BNP 23 12/11/2024    HGBA1C 5.8 (A) 06/23/2022       Last Cardiology Tests:  12/23/2024 - CXR  1.  Cardiomegaly with mild pulmonary vascular congestion and diffuse interstitial prominence suggesting component of edema overall similar to prior. Mild patchy bibasilar infiltrates/atelectasis left greater than right and blunting costophrenic angles suggesting small effusions grossly similar to prior. Mildly elevated left hemidiaphragm. Continued clinical correlation and follow-up advised.  2. Mild prominence aortic arch similar to prior.    12/23/2024 - TTE  1. The left ventricular systolic function is normal, with a visually estimated ejection fraction of 55%.  2. Spectral Doppler shows a normal pattern of left ventricular diastolic filling.  3. There is normal right ventricular global systolic function.  4. Moderately enlarged right ventricle.  5. The left atrium is  moderately dilated.  6. Right ventricular systolic pressure is within normal limits.  7. Mild aortic valve stenosis.  8. Mild aortic valve regurgitation.  9. The inferior vena cava appears moderately dilated, with IVC inspiratory collapse greater than 50%.  10. There is moderately increased septal and mildly increased posterior left ventricular wall thickness.    11/09/2023 - TTE  Left ventricular systolic function is normal with a 55% estimated ejection fraction.     09/25/2023 - TTE  1. Left ventricular systolic function is mildly decreased with a 45-50% estimated ejection fraction.  2. Moderately increased left ventricular septal thickness.  3. Left Ventricular Global Longitudinal Strain - 12.8 %.    10/21/2022 - Electrophysiology Procedure   RFA.    07/29/2022 - Cardiac Catheterization (LH/RH)  1. No evidence of coronary artery disease.  2. Left Ventricular end-diastolic pressure = 16.  3. Normal LV filling pressures.    07/18/2022 - TTE  1. The left ventricular systolic function is severely decreased with a 30-35% estimated ejection fraction.  2. Severely enlarged right ventricle.  3. The left atrium is moderate to severely dilated.  4. The right atrium is moderately dilated.  5. Mildly elevated RVSP.  6. Mild aortic valve stenosis.  7. The left ventricular cavity size is moderately dilated.  8. There is global hypokinesis of the left ventricle with minor regional variations.     Lab review: I have personally reviewed the laboratory result(s).     Assessment/Plan  1) Atrial Fibrillation s/p RFA Oct 2022  On ASA 81 mg daily, carvedilol 25 mg BID  Xarelto previously discontinued as patient participates in high risk activities such as car racing, motorcycle and mountain bike riding.  ? rate related CMP  TTE 11/09/2023 with LVEF 55% - improved from 45-50%   In NSR  Continue current medical Rx   Followup with Erika Deng NP, in 6 months    2) Chronic Systolic Heart Failure - HTN  On ASA 81 mg daily, carvedilol  25 mg BID, losartan 100 mg daily in the a.m., Lasix 40 mg daily, amlodipine 5 mg daily.  Appears compensated on exam  Left heart cath with no significant CAD July 2022  If LVEF remains <35%, EP to consider ICD   TTE 11/09/2023 with LVEF 55% - improved from 45-50%   Underwent prostatectomy on 10/14/2024  Subsequently admitted to hospital with infection  Inpatient CXR showed small left pleural effusion with atelectasis  CXR 12/23/2024 with cardiomegaly with mild pulmonary vascular congestion and diffuse interstitial prominence suggesting component of edema, mild patchy bibasilar infiltrates/atelectasis left greater than right and blunting costophrenic angles suggesting small effusions, mildly elevated left hemidiaphragm, mild prominence the aortic arch.  TTE 12/23/2024 with LVEF 55%, normal RV systolic function, mild aortic stenosis, mild aortic regurgitation, moderately dilated IVC and moderately increased septal, mildly increased posterior left ventricular wall thickness.  BP improved to 140/80 on average, per patient  Reports occasional exertional SOB  Continue current medical Rx   Check HR CT chest to r/o pulmonary fibrosis - call with results   Followup with Erika Deng NP, in 6 months      Scribe Attestation  By signing my name below, I, Mikhail Marie   attest that this documentation has been prepared under the direction and in the presence of Venancio Brooks MD.

## 2025-02-06 NOTE — PATIENT INSTRUCTIONS
Continue all current medications as prescribed.  Dr. Brooks has ordered a CT scan of your chest/lungs. You will be notified of the results once they become available.    Followup with Erika Deng NP, in 6 months.      If you have any questions or cardiac concerns, please call our office at [phone].

## 2025-02-20 ENCOUNTER — OFFICE VISIT (OUTPATIENT)
Dept: UROLOGY | Facility: HOSPITAL | Age: 65
End: 2025-02-20
Payer: COMMERCIAL

## 2025-02-20 DIAGNOSIS — Z90.79 S/P PROSTATECTOMY: ICD-10-CM

## 2025-02-20 DIAGNOSIS — C61 MALIGNANT NEOPLASM OF PROSTATE (MULTI): Primary | ICD-10-CM

## 2025-02-20 PROCEDURE — 99214 OFFICE O/P EST MOD 30 MIN: CPT | Performed by: UROLOGY

## 2025-02-20 PROCEDURE — G2211 COMPLEX E/M VISIT ADD ON: HCPCS | Performed by: UROLOGY

## 2025-02-20 RX ORDER — TADALAFIL 5 MG/1
5 TABLET ORAL DAILY
Qty: 90 TABLET | Refills: 3 | Status: SHIPPED | OUTPATIENT
Start: 2025-02-20 | End: 2026-02-15

## 2025-02-20 RX ORDER — TADALAFIL 20 MG/1
20 TABLET ORAL DAILY PRN
Qty: 30 TABLET | Refills: 3 | Status: SHIPPED | OUTPATIENT
Start: 2025-02-20 | End: 2025-06-20

## 2025-02-20 NOTE — PROGRESS NOTES
FUV    Last Visit 11/21/24    HISTORY OF PRESENT ILLNESS:   Domingo Greer is a 64 y.o. male who is being seen today for FUV  - s/p RALP + BPLND on 10/14/24- Path showed prostatic adenocarcinoma with negative margins and negative lymph nodes  -- PI-RADS 4 on Prostate MRI on 12/7/23   - Adore score 4+3=7, Grade Group 3, involving 5/5 cores in 95% on 1/5/24  - He does have low testosterone and has been on HRT for the last 13 years.  - Prior hx of laparoscopic gall bladder removal.   PAST MEDICAL HISTORY:  Past Medical History:   Diagnosis Date    Arrhythmia     s/p RFA Oct 2022 with Dr. Yeager    Asthma     BPH (benign prostatic hyperplasia)     Cellulitis, unspecified     Cellulitis    Dilated cardiomyopathy (Multi)     DVT (deep venous thrombosis) (Multi)     multiple, several years ago, s/p DOAC    Hyperlipidemia     Hypertension     Low testosterone in male     Peripheral vascular disease (CMS-HCC)     Prostate cancer (Multi)     Seizure (Multi)     in the setting of fever/infection    Sleep apnea     CPAP    Venous insufficiency of both lower extremities     Venous ulcer (Multi)     right foot       PAST SURGICAL HISTORY:  Past Surgical History:   Procedure Laterality Date    ANKLE SURGERY Right 11/13/2013    Ankle Surgery    CARDIAC CATHETERIZATION      CARDIAC ELECTROPHYSIOLOGY MAPPING AND ABLATION      for afib    CHOLECYSTECTOMY  11/13/2013    Cholecystectomy    COLONOSCOPY      OTHER SURGICAL HISTORY  09/23/2022    Vascular surgical procedure    PROSTATE BIOPSY  01/2024    PROSTATECTOMY N/A 10/14/2024    robotic radical prostatectomy with BPLND    ROTATOR CUFF REPAIR Bilateral 11/13/2013    Rotator Cuff Repair    WISDOM TOOTH EXTRACTION          ALLERGIES:   Allergies   Allergen Reactions    Other Anaphylaxis     Bee sting    Penicillin Anaphylaxis     per family history-2 brothers    Bee Venom Protein (Honey Bee) Unknown        MEDICATIONS:   Current Outpatient Medications   Medication Instructions     "albuterol 90 mcg/actuation inhaler Inhale 1 puff every 6 hours if needed for wheezing or shortness of breath.    amLODIPine (NORVASC) 5 mg, oral, Daily    aspirin 81 mg EC tablet 1 tablet, Daily    carvedilol (COREG) 25 mg, oral, 2 times daily (morning and late afternoon)    furosemide (LASIX) 40 mg, oral, Daily    losartan (COZAAR) 100 mg, oral, Daily    tadalafil (CIALIS) 5 mg, oral, Daily        PHYSICAL EXAM:  There were no vitals taken for this visit.  Constitutional: Patient appears well-developed and well-nourished. No distress.    Pulmonary/Chest: Effort normal. No respiratory distress.   Abdominal: Soft, ND NT  : WNL  Musculoskeletal: Normal range of motion.    Neurological: Alert and oriented to person, place, and time.  Psychiatric: Normal mood and affect. Behavior is normal. Thought content normal.      Labs:  No results found for: \"TESTOSTERONE\"  Lab Results   Component Value Date    PSA 0.01 11/25/2024     Lab Results   Component Value Date    PSA 0.01 11/25/2024    PSA 7.82 (H) 06/03/2024    PSA 4.83 (H) 11/13/2023    PSA 3.11 06/23/2022       No components found for: \"CBC\"  Lab Results   Component Value Date    CREATININE 1.07 12/23/2024     No components found for: \"TESTOTMS\"  No results found for: \"TESTF\"    Imaging:   - MRI of prostate on 12/7/23 demonstrated 0.9cm PI RADS 4 lesions in the left paramedial peripheral zone lesion at the midgland, which demonstrates diffusion restriction and early focal enhancement.  - PSMA PET SCAN on 2/12/24 demonstrated there is no evidence for Ga68 PSMA avid pelvic lymph node  metastasis. There is no evidence for Ga68 PSMA avid distant metastatic disease.  Path showed prostatic adenocarcinoma with negative margins and negative lymph nodes    Discussion:   Patient is s/p RALP + BPLND on 10/14/24.  Denies hematuria, dysuria, nocturia, flank pain, and bothersome frequency or urgency. Denies f/c/n/v. He is occasionally dribbling and changes a pad sometimes at " night. He is able to generate a stream. Goes through 1-2 pads daily. Diarrhea now resolved and his appetite and bowel movements are normal. For ED, trying daily dosing Cialis 5 mg with a little response. Also would like refill for Cialis 20mg. Recommend trying vacuum device for ED. Pt willing to try. Will continue with pelvic floor exercises.     Assessment:      1. Malignant neoplasm of prostate (Multi)  PSA, Ultrasensitive    PSA, Ultrasensitive    Measure post void residual    Measure post void residual          Domingo Greer is a 64 y.o. male here for FUV     Plan:    Will try vacuum pumps for ED  Cont pelvic floor exercises   Cialis 20mg refilled  Follow up in 3 months with PSA prior  All questions and concerns were addressed. Patient verbalizes understanding and has no other questions at this time.     Scribe Attestation  By signing my name below, Lisa DODGE, Mikhail   attest that this documentation has been prepared under the direction and in the presence of Maik Steele MD.

## 2025-02-24 LAB — PSA SERPL DL<=0.01 NG/ML-MCNC: 0.03 NG/ML

## 2025-02-26 ENCOUNTER — HOSPITAL ENCOUNTER (OUTPATIENT)
Dept: RADIOLOGY | Facility: HOSPITAL | Age: 65
Discharge: HOME | End: 2025-02-26
Payer: COMMERCIAL

## 2025-02-26 DIAGNOSIS — I50.32 HEART FAILURE WITH IMPROVED EJECTION FRACTION (HFIMPEF): ICD-10-CM

## 2025-02-26 DIAGNOSIS — R93.89 ABNORMAL CXR: ICD-10-CM

## 2025-02-26 DIAGNOSIS — I48.0 PAROXYSMAL ATRIAL FIBRILLATION (MULTI): ICD-10-CM

## 2025-02-26 DIAGNOSIS — R06.02 SHORTNESS OF BREATH: ICD-10-CM

## 2025-02-26 PROCEDURE — 71250 CT THORAX DX C-: CPT

## 2025-02-27 ENCOUNTER — TELEPHONE (OUTPATIENT)
Dept: CARDIOLOGY | Facility: HOSPITAL | Age: 65
End: 2025-02-27
Payer: COMMERCIAL

## 2025-02-27 DIAGNOSIS — R93.89 ABNORMAL CT OF THE CHEST: Primary | ICD-10-CM

## 2025-02-27 NOTE — TELEPHONE ENCOUNTER
----- Message from Venancio Brooks sent at 2/27/2025  9:07 AM EST -----  Refer to Pulmonary for possible diaphragmatic dysfunction

## 2025-04-10 ENCOUNTER — OFFICE VISIT (OUTPATIENT)
Dept: PULMONOLOGY | Facility: HOSPITAL | Age: 65
End: 2025-04-10
Payer: COMMERCIAL

## 2025-04-10 VITALS
TEMPERATURE: 97.8 F | DIASTOLIC BLOOD PRESSURE: 88 MMHG | SYSTOLIC BLOOD PRESSURE: 158 MMHG | HEART RATE: 70 BPM | RESPIRATION RATE: 16 BRPM | OXYGEN SATURATION: 96 %

## 2025-04-10 DIAGNOSIS — R06.02 SHORTNESS OF BREATH: ICD-10-CM

## 2025-04-10 DIAGNOSIS — J30.9 ALLERGIC RHINITIS, UNSPECIFIED SEASONALITY, UNSPECIFIED TRIGGER: ICD-10-CM

## 2025-04-10 DIAGNOSIS — J98.6 ELEVATED DIAPHRAGM: ICD-10-CM

## 2025-04-10 DIAGNOSIS — J45.909 ASTHMA, UNSPECIFIED ASTHMA SEVERITY, UNSPECIFIED WHETHER COMPLICATED, UNSPECIFIED WHETHER PERSISTENT (HHS-HCC): Primary | ICD-10-CM

## 2025-04-10 DIAGNOSIS — R93.89 ABNORMAL CT OF THE CHEST: ICD-10-CM

## 2025-04-10 PROCEDURE — 99204 OFFICE O/P NEW MOD 45 MIN: CPT | Performed by: NURSE PRACTITIONER

## 2025-04-10 PROCEDURE — 99214 OFFICE O/P EST MOD 30 MIN: CPT | Performed by: NURSE PRACTITIONER

## 2025-04-10 PROCEDURE — 1036F TOBACCO NON-USER: CPT | Performed by: NURSE PRACTITIONER

## 2025-04-10 RX ORDER — MONTELUKAST SODIUM 10 MG/1
10 TABLET ORAL NIGHTLY
Qty: 30 TABLET | Refills: 11 | Status: SHIPPED | OUTPATIENT
Start: 2025-04-10

## 2025-04-10 RX ORDER — ALBUTEROL SULFATE 90 UG/1
4 INHALANT RESPIRATORY (INHALATION) ONCE
OUTPATIENT
Start: 2025-04-10

## 2025-04-10 RX ORDER — BUDESONIDE AND FORMOTEROL FUMARATE DIHYDRATE 160; 4.5 UG/1; UG/1
2 AEROSOL RESPIRATORY (INHALATION)
Qty: 10.2 G | Refills: 11 | Status: SHIPPED | OUTPATIENT
Start: 2025-04-10

## 2025-04-10 RX ORDER — ALBUTEROL SULFATE 0.83 MG/ML
3 SOLUTION RESPIRATORY (INHALATION) ONCE
OUTPATIENT
Start: 2025-04-10 | End: 2025-04-10

## 2025-04-10 ASSESSMENT — ENCOUNTER SYMPTOMS
UNEXPECTED WEIGHT CHANGE: 0
CHILLS: 0
FEVER: 0
COUGH: 1
LOSS OF SENSATION IN FEET: 0
OCCASIONAL FEELINGS OF UNSTEADINESS: 0
WHEEZING: 1
RHINORRHEA: 0
FATIGUE: 0
SHORTNESS OF BREATH: 1
DEPRESSION: 0

## 2025-04-10 ASSESSMENT — PATIENT HEALTH QUESTIONNAIRE - PHQ9
1. LITTLE INTEREST OR PLEASURE IN DOING THINGS: NOT AT ALL
2. FEELING DOWN, DEPRESSED OR HOPELESS: NOT AT ALL
SUM OF ALL RESPONSES TO PHQ9 QUESTIONS 1 AND 2: 0

## 2025-04-10 NOTE — PATIENT INSTRUCTIONS
Start on Symbicort 2 puffs twice a day, everyday.  Continue on albuterol as needed.  Start on Montelukast (Singulair) at bedtime.   Please get lung test done.  Please get sniff test for you diaphragm.  Please get blood work when you have this drawn next.  Call with any questions or concerns.  Follow up with me in 3 months.

## 2025-04-10 NOTE — PROGRESS NOTES
Subjective   Patient ID: Domingo Greer is a 64 y.o. male who presents for NPV for abnormal CT chest.     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 He was referred by cardiology for abnormal CT chest. He states that he gets shortness of breath on mild exertion. He states that this shortness of breath started in October. He states that he was diagnosed with asthma about 20 years ago. He does have albuterol but does not use this often. He has a daily productive cough and will hear himself wheezing. He gets occasional seasonal allergies he does not typically have to take anything for this. He states that a few years ago he was on Montelukast and this significantly helped his symptoms, he is unsure why he stopped this. On his HRCT it showed elevation of left hemidiaphragm. He is a former smoker at 1 ppd x 20 years, quit 20 years ago. He is on CPAP and is compliant with this nightly.    Review of Systems   Constitutional:  Negative for chills, fatigue, fever and unexpected weight change.   HENT:  Positive for congestion. Negative for postnasal drip and rhinorrhea.    Respiratory:  Positive for cough (denies hemoptysis.), shortness of breath and wheezing.    Cardiovascular:  Negative for chest pain and leg swelling.   All other systems reviewed and are negative.      Objective   Physical Exam  Vitals reviewed.   Constitutional:       Appearance: Normal appearance.   HENT:      Head: Normocephalic.   Cardiovascular:      Rate and Rhythm: Normal rate and regular rhythm.   Pulmonary:      Effort: Pulmonary effort is normal.      Breath sounds: Normal breath sounds.   Skin:     General: Skin is warm and dry.   Neurological:      Mental Status: He is alert.         Assessment/Plan   Bronchial asthma  Allergic rhinitis  Former smoker  Elevated left hemidiaphragm   CLIVE, on CPAP  CHF  History of atrial fibrillation     Plan:    -PFTs ordered.   -Start  Symbicort BID.   -Continue albuterol prn.   -IGE/RAST ordered. Eos were 410.   -Start back on Montelukast patient states he noted significant benefit with this in the past. He denies history of significant anxiety. Discussed potential side effects.   -He is a former smoker at 1 ppd x 20 years, quit 20 years ago.   -He had HRCT done February 2025 with some mild scarring vs atelectasis but no concern for ILD. He has elevation of left hemidiaphragm with basilar atelectasis. Discussed results at length, recommend sniff test at this time. Will assess for restriction on PFTs also.   -He has CLIVE and is compliant with CPAP nightly.   -He follows closely with cardiology.     Overall I will optimize asthma management for him and obtain testing. I will see him back for follow up in 3 months. I instructed patient to call sooner if needed.      Total time:  45 min.

## 2025-04-21 ENCOUNTER — HOSPITAL ENCOUNTER (OUTPATIENT)
Dept: RADIOLOGY | Facility: HOSPITAL | Age: 65
Discharge: HOME | End: 2025-04-21
Payer: COMMERCIAL

## 2025-04-21 DIAGNOSIS — J98.6 ELEVATED DIAPHRAGM: ICD-10-CM

## 2025-04-21 PROCEDURE — 76000 FLUOROSCOPY <1 HR PHYS/QHP: CPT

## 2025-04-22 DIAGNOSIS — J98.6 ELEVATED DIAPHRAGM: ICD-10-CM

## 2025-04-22 DIAGNOSIS — J98.6 PARALYSIS OF DIAPHRAGM: ICD-10-CM

## 2025-04-23 ENCOUNTER — APPOINTMENT (OUTPATIENT)
Dept: RESPIRATORY THERAPY | Facility: HOSPITAL | Age: 65
End: 2025-04-23
Payer: COMMERCIAL

## 2025-04-23 ENCOUNTER — TELEPHONE (OUTPATIENT)
Dept: PULMONOLOGY | Facility: HOSPITAL | Age: 65
End: 2025-04-23
Payer: COMMERCIAL

## 2025-04-23 NOTE — TELEPHONE ENCOUNTER
Patient acknowledged understanding. All questions answered at this time.     ----- Message from Felicia Julio sent at 4/23/2025  9:38 AM EDT -----  Referral placed.   ----- Message -----  From: Tabby López RN  Sent: 4/23/2025   9:30 AM EDT  To: JP Roy    Patient is agreeable to the referral.  ----- Message -----  From: JP Roy  Sent: 4/22/2025   3:28 PM EDT  To: Tabby López RN    Please call the patient and let him know that on the sniff test he did the left hemidiaphragm is paralyzed. We discussed this was a possibility at his initial visit. What I recommend is a consult to   Dr. Fields (thoracic surgery) for further recommendations if he is agreeable I will place the referral.   ----- Message -----  From: Angela, Radiology Results In  Sent: 4/21/2025   8:58 AM EDT  To: JP Roy

## 2025-04-28 ENCOUNTER — HOSPITAL ENCOUNTER (OUTPATIENT)
Dept: RESPIRATORY THERAPY | Facility: HOSPITAL | Age: 65
Discharge: HOME | End: 2025-04-28
Payer: COMMERCIAL

## 2025-04-28 DIAGNOSIS — R06.02 SHORTNESS OF BREATH: ICD-10-CM

## 2025-04-28 LAB
MGC ASCENT PFT - FEV1 - POST: 1.9
MGC ASCENT PFT - FEV1 - PRE: 1.81
MGC ASCENT PFT - FEV1 - PREDICTED: 3.27
MGC ASCENT PFT - FVC - POST: 2.55
MGC ASCENT PFT - FVC - PRE: 2.52
MGC ASCENT PFT - FVC - PREDICTED: 4.25

## 2025-04-28 PROCEDURE — 2500000001 HC RX 250 WO HCPCS SELF ADMINISTERED DRUGS (ALT 637 FOR MEDICARE OP): Performed by: NURSE PRACTITIONER

## 2025-04-28 PROCEDURE — 94640 AIRWAY INHALATION TREATMENT: CPT | Mod: 59

## 2025-04-28 PROCEDURE — 94726 PLETHYSMOGRAPHY LUNG VOLUMES: CPT

## 2025-04-28 RX ORDER — ALBUTEROL SULFATE 90 UG/1
4 INHALANT RESPIRATORY (INHALATION) ONCE
Status: COMPLETED | OUTPATIENT
Start: 2025-04-28 | End: 2025-04-28

## 2025-04-28 RX ADMIN — ALBUTEROL SULFATE 4 PUFF: 90 AEROSOL, METERED RESPIRATORY (INHALATION) at 15:25

## 2025-04-29 ENCOUNTER — TELEPHONE (OUTPATIENT)
Dept: PULMONOLOGY | Facility: HOSPITAL | Age: 65
End: 2025-04-29
Payer: COMMERCIAL

## 2025-04-29 NOTE — TELEPHONE ENCOUNTER
Patient acknowledged understanding. All questions answered at this time.     ----- Message from Felicia Julio sent at 4/29/2025  9:55 AM EDT -----  Please call the patient and let him know that his PFTs show moderate restriction, recommend continue current regimen and keep appointment with Dr. Fields as scheduled.   ----- Message -----  From: Interface, Pft Results In  Sent: 4/28/2025   4:58 PM EDT  To: Felicia Julio, FATUMA-CNP

## 2025-05-06 ENCOUNTER — APPOINTMENT (OUTPATIENT)
Dept: SURGERY | Facility: CLINIC | Age: 65
End: 2025-05-06
Payer: COMMERCIAL

## 2025-05-06 VITALS — WEIGHT: 271 LBS | HEIGHT: 72 IN | TEMPERATURE: 97.7 F | BODY MASS INDEX: 36.7 KG/M2

## 2025-05-06 DIAGNOSIS — J98.6 ELEVATED DIAPHRAGM: Primary | ICD-10-CM

## 2025-05-06 DIAGNOSIS — J98.6 DIAPHRAGM DYSFUNCTION: Primary | ICD-10-CM

## 2025-05-06 DIAGNOSIS — J98.6 PARALYSIS OF DIAPHRAGM: ICD-10-CM

## 2025-05-06 PROCEDURE — 3008F BODY MASS INDEX DOCD: CPT | Performed by: STUDENT IN AN ORGANIZED HEALTH CARE EDUCATION/TRAINING PROGRAM

## 2025-05-06 PROCEDURE — 99215 OFFICE O/P EST HI 40 MIN: CPT | Performed by: STUDENT IN AN ORGANIZED HEALTH CARE EDUCATION/TRAINING PROGRAM

## 2025-05-06 PROCEDURE — 99205 OFFICE O/P NEW HI 60 MIN: CPT | Performed by: STUDENT IN AN ORGANIZED HEALTH CARE EDUCATION/TRAINING PROGRAM

## 2025-05-06 NOTE — PATIENT INSTRUCTIONS
Referral placed for Dr. Wright. You will receive a call to schedule or you can call the referral scheduling number at [phone].

## 2025-05-13 DIAGNOSIS — C61 MALIGNANT NEOPLASM OF PROSTATE (MULTI): ICD-10-CM

## 2025-05-13 NOTE — H&P
Chief complaint:  Lt hemidiaphragm paralysis    History Of Present Illness  Domingo Greer is a 64 y.o. male, former smoker, presenting with a paralyzed left hemidiaphragm. Patient was referred by Felicia Julio, Pulmonology.    Patient reports that in October, around the time of his prostate surgery, he began feeling short of breath. Was hospitalized after his surgery for sepsis and bacteremia without an obvious source per his report. Since then has not felt the same in terms of his breathing.  He denies any obvious pneumonias, no recent history of chest trauma though he states he may have had some in the past.  Denies any significant unexplained weight loss or gain. Daily cough, mildly productive (thin phlegm). Has a h/o asthma. Has CLIVE, on CPAP (feels better when wearing his CPAP or when riding his motorcycle).    Active, lifts weights frequently    No history of MI or CVA. Could walk a mile or climb 2 flights of stairs: yes     Past Medical History  He has a past medical history of Arrhythmia, Asthma, BPH (benign prostatic hyperplasia), Cellulitis, unspecified, Dilated cardiomyopathy (Multi), DVT (deep venous thrombosis) (Multi), Hyperlipidemia, Hypertension, Low testosterone in male, Peripheral vascular disease (CMS-HCC), Prostate cancer (Multi), Seizure (Multi), Sleep apnea, Venous insufficiency of both lower extremities, and Venous ulcer (Multi).    Surgical History  He has a past surgical history that includes Rotator cuff repair (Bilateral, 11/13/2013); Cholecystectomy (11/13/2013); Ankle surgery (Right, 11/13/2013); Other surgical history (09/23/2022); Cardiac electrophysiology mapping and ablation; Colonoscopy; Cardiac catheterization; [place] tooth extraction; Prostate biopsy (01/2024); and Prostatectomy (N/A, 10/14/2024).     Social History  He reports that he has quit smoking. His smoking use included cigarettes. He has never used smokeless tobacco. He reports current alcohol use of about 2.0 standard  drinks of alcohol per week. He reports that he does not use drugs.    Family History  Family history of cancer: Yes  Family History[1]     Medications  Current Medications[2]    Allergies  Other, Penicillin, Bee venom protein (honey bee), and Hymenoptera venom    Review of Systems:  Review of Systems   Constitutional: No fevers, chills, unexpected weight change  HENT: No sore throat, congestion, or nasal drainage  Eyes: No visual changes or eye itching  Respiratory: see HPI. No cough, worsening dyspnea, wheezing  Cardiac: No chest pain, palpitations, or lower extremity edema  Gastrointestinal: No nausea, vomiting, diarrhea. No abdominal pain  Genitourinary: No dysuria or hematuria  Musculoskeletal: No back pain. No significant myalgias or arthralgias  Neurologic: No headaches, dizziness, or seizures.  Hematologic: No easy bleeding or bruising.  Psychiatric: No anxiety or depression.    Physical Exam:  Physical Exam  Temp 36.5 °C (97.7 °F) (Temporal)   Ht 1.829 m (6')   Wt 123 kg (271 lb)   BMI 36.75 kg/m²   Constitutional:       General: Patient is not in acute distress.     Appearance: Normal appearance; not ill-appearing.   HENT:      Head: Normocephalic.      Nose: No congestion or rhinorrhea.   Cardiovascular:      Rate and Rhythm: Normal rate and regular rhythm.      Pulses: Normal pulses.   Pulmonary:      Effort: Pulmonary effort is normal. No respiratory distress.  No conversational dyspnea     Breath sounds: No stridor. No wheezing.   Abdominal:      General: There is no distension.      Palpations: Abdomen is soft.      Tenderness: There is no abdominal tenderness.   Musculoskeletal:         General: No swelling, tenderness or deformity. Normal range of motion.      Cervical back: Normal range of motion. No rigidity.   Lymphadenopathy:      Cervical: No cervical adenopathy.   Skin:     General: Skin is warm and dry.   Neurological:      General: No focal deficit present.      Mental Status: Patient is  "alert and oriented to person, place, and time.   Psychiatric:         Mood and Affect: Mood normal.     Relevant Results    Pathology:  N/a     Imaging:    Imaging  No results found.    Cardiology, Vascular, and Other Imaging  No other imaging results found for the past 7 days       Pulmonary Functions Testing Results:    No results found for: \"FEV1\", \"FVC\", \"BYG8RPD\", \"TLC\", \"DLCO\"    CT chest and SNIF test personally reviewed     Assessment/Plan   Problem List Items Addressed This Visit    None  Visit Diagnoses         Codes      Elevated diaphragm    -  Primary J98.6    Relevant Orders    Referral to General Surgery      Paralysis of diaphragm     J98.6    Relevant Orders    Referral to General Surgery            Mr. Greer is a pleasant 64-year-old male with left hemidiaphragm paralysis noted on workup for worsening dyspnea.  His shortness of breath started shortly after his prostate surgery which was complicated by some sort of sepsis.  Unsure if the timing is coincidental, but could be related.  He has no other recent history of significant chest trauma or obvious chest infections.  We discussed surgical diaphragm plication robotically through the left chest as well as the perioperative expectations.  I additionally briefly discussed the alternate option with the diaphragmatic pacer.  He is interested in hearing more about this as well and I will therefore refer him to Dr. Wright for further discussion.  He will call if he decides to pursue a surgical option and we will choose a date of surgery     I spent 70 minutes in the professional and overall care of this patient.      Pura Fields, DO  Thoracic & Esophageal Surgery          [1]   Family History  Problem Relation Name Age of Onset    Hypertension Mother      Prostate cancer Father     [2]   Current Outpatient Medications:     albuterol 90 mcg/actuation inhaler, Inhale 1 puff every 6 hours if needed for wheezing or shortness of breath., Disp: , Rfl: "     amLODIPine (Norvasc) 5 mg tablet, Take 1 tablet (5 mg) by mouth once daily., Disp: 90 tablet, Rfl: 3    aspirin 81 mg EC tablet, Take 1 tablet (81 mg) by mouth once daily., Disp: , Rfl:     budesonide-formoterol (Symbicort) 160-4.5 mcg/actuation inhaler, Inhale 2 puffs 2 times a day. Rinse mouth with water after use to reduce aftertaste and incidence of candidiasis. Do not swallow., Disp: 10.2 g, Rfl: 11    carvedilol (Coreg) 25 mg tablet, Take 1 tablet (25 mg) by mouth 2 times daily (morning and late afternoon)., Disp: 180 tablet, Rfl: 3    furosemide (Lasix) 40 mg tablet, Take 1 tablet (40 mg) by mouth once daily., Disp: 90 tablet, Rfl: 2    losartan (Cozaar) 100 mg tablet, Take 1 tablet (100 mg) by mouth once daily., Disp: 90 tablet, Rfl: 3    montelukast (Singulair) 10 mg tablet, Take 1 tablet (10 mg) by mouth once daily at bedtime., Disp: 30 tablet, Rfl: 11    tadalafil (Cialis) 20 mg tablet, Take 1 tablet (20 mg) by mouth once daily as needed for erectile dysfunction., Disp: 30 tablet, Rfl: 3    tadalafil (Cialis) 5 mg tablet, Take 1 tablet (5 mg) by mouth once daily., Disp: 90 tablet, Rfl: 3

## 2025-05-14 LAB
A ALTERNATA IGE QN: <0.1 KU/L
A ALTERNATA IGE RAST: 0
A FUMIGATUS IGE QN: <0.1 KU/L
A FUMIGATUS IGE RAST: 0
BERMUDA GRASS IGE QN: <0.1 KU/L
BERMUDA GRASS IGE RAST: 0
BOXELDER IGE QN: <0.1 KU/L
BOXELDER IGE RAST: 0
C HERBARUM IGE QN: <0.1 KU/L
C HERBARUM IGE RAST: 0
CALIF WALNUT POLN IGE QN: <0.1 KU/L
CALIF WALNUT POLN IGE RAST: 0
CAT DANDER IGE QN: <0.1 KU/L
CAT DANDER IGE RAST: 0
CMN PIGWEED IGE QN: <0.1 KU/L
CMN PIGWEED IGE RAST: 0
COMMON RAGWEED IGE QN: <0.1 KU/L
COMMON RAGWEED IGE RAST: 0
COTTONWOOD IGE QN: <0.1 KU/L
COTTONWOOD IGE RAST: 0
D FARINAE IGE QN: <0.1 KU/L
D FARINAE IGE RAST: 0
D PTERONYSS IGE QN: <0.1 KU/L
D PTERONYSS IGE RAST: 0
DOG DANDER IGE QN: <0.1 KU/L
DOG DANDER IGE RAST: 0
IGE SERPL-ACNC: 77 KU/L
LONDON PLANE IGE QN: <0.1 KU/L
LONDON PLANE IGE RAST: 0
MOUSE URINE PROT IGE QN: <0.1 KU/L
MOUSE URINE PROT IGE RAST: 0
MT JUNIPER IGE QN: <0.1 KU/L
MT JUNIPER IGE RAST: 0
P NOTATUM IGE QN: <0.1 KU/L
P NOTATUM IGE RAST: 0
PECAN/HICK TREE IGE QN: <0.1 KU/L
PECAN/HICK TREE IGE RAST: 0
REF LAB TEST REF RANGE: NORMAL
ROACH IGE QN: <0.1 KU/L
ROACH IGE RAST: 0
SALTWORT IGE QN: <0.1 KU/L
SALTWORT IGE RAST: 0
SHEEP SORREL IGE QN: <0.1 KU/L
SHEEP SORREL IGE RAST: 0
SILVER BIRCH IGE QN: <0.1 KU/L
SILVER BIRCH IGE RAST: 0
TIMOTHY IGE QN: <0.1 KU/L
TIMOTHY IGE RAST: 0
WHITE ASH IGE QN: <0.1 KU/L
WHITE ASH IGE RAST: 0
WHITE ELM IGE QN: <0.1 KU/L
WHITE ELM IGE RAST: 0
WHITE MULBERRY IGE QN: <0.1 KU/L
WHITE MULBERRY IGE RAST: 0
WHITE OAK IGE QN: <0.1 KU/L
WHITE OAK IGE RAST: 0

## 2025-05-15 LAB — PSA SERPL-MCNC: 0.12 NG/ML

## 2025-05-16 ENCOUNTER — HOSPITAL ENCOUNTER (OUTPATIENT)
Dept: RADIOLOGY | Facility: HOSPITAL | Age: 65
Discharge: HOME | End: 2025-05-16
Payer: COMMERCIAL

## 2025-05-16 ENCOUNTER — APPOINTMENT (OUTPATIENT)
Dept: SURGERY | Facility: CLINIC | Age: 65
End: 2025-05-16
Payer: COMMERCIAL

## 2025-05-16 VITALS
SYSTOLIC BLOOD PRESSURE: 158 MMHG | HEART RATE: 75 BPM | WEIGHT: 271 LBS | DIASTOLIC BLOOD PRESSURE: 84 MMHG | BODY MASS INDEX: 36.75 KG/M2

## 2025-05-16 DIAGNOSIS — I10 PRIMARY HYPERTENSION: ICD-10-CM

## 2025-05-16 DIAGNOSIS — J98.6 DIAPHRAGM DYSFUNCTION: Primary | ICD-10-CM

## 2025-05-16 DIAGNOSIS — J98.6 ELEVATED DIAPHRAGM: ICD-10-CM

## 2025-05-16 DIAGNOSIS — J98.6 DIAPHRAGM DYSFUNCTION: ICD-10-CM

## 2025-05-16 DIAGNOSIS — J98.6 PARALYSIS OF DIAPHRAGM: ICD-10-CM

## 2025-05-16 PROCEDURE — 71046 X-RAY EXAM CHEST 2 VIEWS: CPT

## 2025-05-16 PROCEDURE — 76000 FLUOROSCOPY <1 HR PHYS/QHP: CPT

## 2025-05-16 PROCEDURE — 3079F DIAST BP 80-89 MM HG: CPT | Performed by: NURSE PRACTITIONER

## 2025-05-16 PROCEDURE — 3077F SYST BP >= 140 MM HG: CPT | Performed by: NURSE PRACTITIONER

## 2025-05-16 PROCEDURE — 99215 OFFICE O/P EST HI 40 MIN: CPT | Performed by: NURSE PRACTITIONER

## 2025-05-16 NOTE — PROGRESS NOTES
"Subjective   Patient ID: Domingo Greer is a 64 y.o. male.    HPI In October 2024, SOB - he had infection - hospitalized for \"infection\" - given lots antibiotics. He pleural effusion. There was concern for CHF,but he did not have CHF - found after sepsis cleared, he had elevated paralyzed left HD.     Shortness of breath with exertion - walking - any walking even flat but any distance is out of breath, unable to bend over, diff getting into car- the SOB is affecting his daily life.   He had PFT and was 59% sitting. The report stated restrictive disease, no obstruction.      Review of Systems  Denies DM, CVA, reports cardiac is good.   Lap james  Carpal tunnel  Prostate    HTN  AFIB with Ablation    Objective   Physical Exam  Constitutional:       Appearance: Normal appearance. He is obese.   Pulmonary:      Comments: Increased effort with walking hallway from 2100 to pacing room  Musculoskeletal:         General: Normal range of motion.   Skin:     General: Skin is warm.   Neurological:      Mental Status: He is alert and oriented to person, place, and time.   Psychiatric:         Mood and Affect: Mood normal.         Assessment/Plan   Diagnoses and all orders for this visit:  Diaphragm dysfunction  -     XR chest 2 views; Future    64 year old male with onset of shortness of breath October 2024. He did have hospitalization for infection and has been short of breath since that time. It is unknown how long the left HD has been dysfunctional.   He has PFT that show restrictive disease. He has classic symptoms of breathing dysfunction associated with diaphragm dysfunction. SNIFF test showed paradoxical motion of the left HD.   Diaphragm Pacing in diaphragm dysfunction is meant to be used as physical therapy to improve diaphragm muscle strength and establish neural control of the diaphragm. We would only implant DP electrodes if at surgery we get response to stimulation. In those we do implant, we have 70% success rate " of improvement with half having full cure. DP is low risk minimally invasive surgery. You would spend the night. I reviewed risks benefits of procedure.  I did review plication surgery. His preference is to have pacing but will have plication if pacing is not available to him at surgery. We will pursue implant date of Jun 23. He has EKG from Feb in system. He has seen cardiologist recently. Blood work is ordered.

## 2025-05-20 ENCOUNTER — HOSPITAL ENCOUNTER (OUTPATIENT)
Facility: HOSPITAL | Age: 65
Setting detail: SURGERY ADMIT
End: 2025-05-20
Attending: SURGERY | Admitting: SURGERY
Payer: COMMERCIAL

## 2025-05-20 PROBLEM — J98.6 ELEVATED DIAPHRAGM: Status: ACTIVE | Noted: 2025-05-16

## 2025-05-20 PROBLEM — J98.6 DIAPHRAGM DYSFUNCTION: Status: ACTIVE | Noted: 2025-05-16

## 2025-05-29 ENCOUNTER — TELEMEDICINE (OUTPATIENT)
Dept: UROLOGY | Facility: HOSPITAL | Age: 65
End: 2025-05-29
Payer: COMMERCIAL

## 2025-05-29 DIAGNOSIS — Z90.79 S/P PROSTATECTOMY: ICD-10-CM

## 2025-05-29 DIAGNOSIS — C61 MALIGNANT NEOPLASM OF PROSTATE (MULTI): Primary | ICD-10-CM

## 2025-05-29 PROCEDURE — 99214 OFFICE O/P EST MOD 30 MIN: CPT | Performed by: UROLOGY

## 2025-05-29 NOTE — PROGRESS NOTES
FUV    Last Visit 2/20/25    HISTORY OF PRESENT ILLNESS:   Domingo rGeer is a 64 y.o. male who is being seen today for FUV  - s/p RALP + BPLND on 10/14/24- Path showed prostatic adenocarcinoma with negative margins and negative lymph nodes  -- PI-RADS 4 on Prostate MRI on 12/7/23   - Kearney score 4+3=7, Grade Group 3, involving 5/5 cores in 95% on 1/5/24  - He does have low testosterone and has been on HRT for the last 13 years.  - Prior hx of laparoscopic gall bladder removal.   PAST MEDICAL HISTORY:  Past Medical History:   Diagnosis Date    Arrhythmia     s/p RFA Oct 2022 with Dr. Yeager    Asthma     BPH (benign prostatic hyperplasia)     Cellulitis, unspecified     Cellulitis    Dilated cardiomyopathy (Multi)     DVT (deep venous thrombosis) (Multi)     multiple, several years ago, s/p DOAC    Hyperlipidemia     Hypertension     Low testosterone in male     Peripheral vascular disease     Prostate cancer (Multi)     Seizure (Multi)     in the setting of fever/infection    Sleep apnea     CPAP    Venous insufficiency of both lower extremities     Venous ulcer (Multi)     right foot       PAST SURGICAL HISTORY:  Past Surgical History:   Procedure Laterality Date    ANKLE SURGERY Right 11/13/2013    Ankle Surgery    CARDIAC CATHETERIZATION      CARDIAC ELECTROPHYSIOLOGY MAPPING AND ABLATION      for afib    CHOLECYSTECTOMY  11/13/2013    Cholecystectomy    COLONOSCOPY      OTHER SURGICAL HISTORY  09/23/2022    Vascular surgical procedure    PROSTATE BIOPSY  01/2024    PROSTATECTOMY N/A 10/14/2024    robotic radical prostatectomy with BPLND    ROTATOR CUFF REPAIR Bilateral 11/13/2013    Rotator Cuff Repair    WISDOM TOOTH EXTRACTION          ALLERGIES:   Allergies   Allergen Reactions    Other Anaphylaxis     Bee sting    Penicillin Anaphylaxis     per family history-2 brothers    Bee Venom Protein (Honey Bee) Unknown    Hymenoptera Venom Other        MEDICATIONS:   Current Outpatient Medications   Medication  "Instructions    albuterol 90 mcg/actuation inhaler Inhale 1 puff every 6 hours if needed for wheezing or shortness of breath.    amLODIPine (NORVASC) 5 mg, oral, Daily    aspirin 81 mg EC tablet 1 tablet, Daily    budesonide-formoterol (Symbicort) 160-4.5 mcg/actuation inhaler 2 puffs, inhalation, 2 times daily RT, Rinse mouth with water after use to reduce aftertaste and incidence of candidiasis. Do not swallow.    carvedilol (COREG) 25 mg, oral, 2 times daily (morning and late afternoon)    furosemide (LASIX) 40 mg, oral, Daily    losartan (COZAAR) 100 mg, oral, Daily    montelukast (SINGULAIR) 10 mg, oral, Nightly    tadalafil (CIALIS) 5 mg, oral, Daily    tadalafil 20 mg, oral, Daily PRN        PHYSICAL EXAM:  There were no vitals taken for this visit.  Constitutional: Patient appears well-developed and well-nourished. No distress.    Pulmonary/Chest: Effort normal. No respiratory distress.   Abdominal: Soft, ND NT  : WNL  Musculoskeletal: Normal range of motion.    Neurological: Alert and oriented to person, place, and time.  Psychiatric: Normal mood and affect. Behavior is normal. Thought content normal.      Labs:  No results found for: \"TESTOSTERONE\"  Lab Results   Component Value Date    PSA 0.12 05/14/2025     Lab Results   Component Value Date    PSA 0.12 05/14/2025    PSA 0.01 11/25/2024    PSA 7.82 (H) 06/03/2024    PSA 4.83 (H) 11/13/2023    PSA 3.11 06/23/2022       No components found for: \"CBC\"  Lab Results   Component Value Date    CREATININE 1.07 12/23/2024     No components found for: \"TESTOTMS\"  No results found for: \"TESTF\"    Imaging:   - MRI of prostate on 12/7/23 demonstrated 0.9cm PI RADS 4 lesions in the left paramedial peripheral zone lesion at the midgland, which demonstrates diffusion restriction and early focal enhancement.  - PSMA PET SCAN on 2/12/24 demonstrated there is no evidence for Ga68 PSMA avid pelvic lymph node  metastasis. There is no evidence for Ga68 PSMA avid distant " metastatic disease.  Path showed prostatic adenocarcinoma with negative margins and negative lymph nodes    Discussion:   Patient is s/p RALP + BPLND on 10/14/24.  Denies hematuria, dysuria, nocturia, flank pain, and bothersome frequency or urgency. He is occasionally dribbling and changes a pad sometimes at night. Will try pelvic floor PT. Goes through 1-2 pads daily. Patient's PSA reviewed- will repeat PSA at [facility].   Assessment:      1. Malignant neoplasm of prostate (Multi)        2. S/P prostatectomy  Referral to Physical Therapy            Domingo Greer is a 64 y.o. male here for FUV     Plan:   Refer to pelvic floor PT  repeat PSA at [facility]  Fu with PSA in 6 months  All questions and concerns were addressed. Patient verbalizes understanding and has no other questions at this time.     Scribe Attestation  By signing my name below, ILisa Scribe   attest that this documentation has been prepared under the direction and in the presence of Maik Steele MD.

## 2025-06-05 ENCOUNTER — LAB (OUTPATIENT)
Dept: LAB | Facility: HOSPITAL | Age: 65
End: 2025-06-05
Payer: COMMERCIAL

## 2025-06-05 DIAGNOSIS — C61 MALIGNANT NEOPLASM OF PROSTATE (MULTI): ICD-10-CM

## 2025-06-05 LAB
ANION GAP SERPL CALC-SCNC: 13 MMOL/L (ref 10–20)
BUN SERPL-MCNC: 34 MG/DL (ref 6–23)
CALCIUM SERPL-MCNC: 9.5 MG/DL (ref 8.6–10.3)
CHLORIDE SERPL-SCNC: 102 MMOL/L (ref 98–107)
CO2 SERPL-SCNC: 29 MMOL/L (ref 21–32)
CREAT SERPL-MCNC: 1.51 MG/DL (ref 0.5–1.3)
EGFRCR SERPLBLD CKD-EPI 2021: 52 ML/MIN/1.73M*2
ERYTHROCYTE [DISTWIDTH] IN BLOOD BY AUTOMATED COUNT: 12.4 % (ref 11.5–14.5)
GLUCOSE SERPL-MCNC: 74 MG/DL (ref 74–99)
HCT VFR BLD AUTO: 51.6 % (ref 41–52)
HGB BLD-MCNC: 17.2 G/DL (ref 13.5–17.5)
MCH RBC QN AUTO: 32.3 PG (ref 26–34)
MCHC RBC AUTO-ENTMCNC: 33.3 G/DL (ref 32–36)
MCV RBC AUTO: 97 FL (ref 80–100)
PLATELET # BLD AUTO: 190 X10*3/UL (ref 150–450)
POTASSIUM SERPL-SCNC: 4.4 MMOL/L (ref 3.5–5.3)
PSA SERPL-MCNC: 0.14 NG/ML
RBC # BLD AUTO: 5.33 X10*6/UL (ref 4.5–5.9)
SODIUM SERPL-SCNC: 140 MMOL/L (ref 136–145)
WBC # BLD AUTO: 7.7 X10*3/UL (ref 4.4–11.3)

## 2025-06-05 PROCEDURE — 84153 ASSAY OF PSA TOTAL: CPT | Mod: PORLAB

## 2025-06-05 PROCEDURE — 82374 ASSAY BLOOD CARBON DIOXIDE: CPT | Performed by: NURSE PRACTITIONER

## 2025-06-05 PROCEDURE — 85027 COMPLETE CBC AUTOMATED: CPT | Performed by: NURSE PRACTITIONER

## 2025-06-09 ENCOUNTER — TELEPHONE (OUTPATIENT)
Dept: SURGERY | Facility: HOSPITAL | Age: 65
End: 2025-06-09
Payer: COMMERCIAL

## 2025-06-09 NOTE — TELEPHONE ENCOUNTER
I called and discussed elevated CR with patient. He said he thought is was due to some dehydration and will have repeated.

## 2025-07-10 ENCOUNTER — APPOINTMENT (OUTPATIENT)
Dept: PULMONOLOGY | Facility: HOSPITAL | Age: 65
End: 2025-07-10
Payer: COMMERCIAL

## 2025-07-10 VITALS
WEIGHT: 271 LBS | BODY MASS INDEX: 36.7 KG/M2 | HEART RATE: 82 BPM | HEIGHT: 72 IN | DIASTOLIC BLOOD PRESSURE: 76 MMHG | OXYGEN SATURATION: 96 % | SYSTOLIC BLOOD PRESSURE: 115 MMHG | RESPIRATION RATE: 16 BRPM

## 2025-07-10 DIAGNOSIS — J30.9 ALLERGIC RHINITIS, UNSPECIFIED SEASONALITY, UNSPECIFIED TRIGGER: ICD-10-CM

## 2025-07-10 DIAGNOSIS — J98.6 ELEVATED DIAPHRAGM: Primary | ICD-10-CM

## 2025-07-10 DIAGNOSIS — J45.909 ASTHMA, UNSPECIFIED ASTHMA SEVERITY, UNSPECIFIED WHETHER COMPLICATED, UNSPECIFIED WHETHER PERSISTENT (HHS-HCC): ICD-10-CM

## 2025-07-10 PROCEDURE — 1036F TOBACCO NON-USER: CPT | Performed by: NURSE PRACTITIONER

## 2025-07-10 PROCEDURE — 99213 OFFICE O/P EST LOW 20 MIN: CPT | Performed by: NURSE PRACTITIONER

## 2025-07-10 PROCEDURE — 3008F BODY MASS INDEX DOCD: CPT | Performed by: NURSE PRACTITIONER

## 2025-07-10 PROCEDURE — 99212 OFFICE O/P EST SF 10 MIN: CPT

## 2025-07-10 ASSESSMENT — ENCOUNTER SYMPTOMS
RHINORRHEA: 0
CHILLS: 0
COUGH: 1
FEVER: 0
SHORTNESS OF BREATH: 1
WHEEZING: 1
FATIGUE: 0
UNEXPECTED WEIGHT CHANGE: 0

## 2025-07-10 NOTE — PROGRESS NOTES
Subjective   Patient ID: Domingo Greer is a 64 y.o. male who presents for follow up for abnormal CT chest.     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 He was referred by cardiology for abnormal CT chest. He states that he gets shortness of breath on mild exertion. He states that this shortness of breath started in October. He states that he was diagnosed with asthma about 20 years ago. He does have albuterol but does not use this often. He has a daily productive cough and will hear himself wheezing. He gets occasional seasonal allergies he does not typically have to take anything for this. He states that a few years ago he was on Montelukast and this significantly helped his symptoms, he is unsure why he stopped this. On his HRCT it showed elevation of left hemidiaphragm. He is a former smoker at 1 ppd x 20 years, quit 20 years ago. He is on CPAP and is compliant with this nightly.    Today he is here for follow up. He had PFTs with moderate restriction and diaphragm was paralyzed on sniff test. He is now scheduled for diaphragm pacer in August. He states that he does feel benefit with Symbicort and albuterol. He also states Montelukast has helped his symptoms. He has no other concerns.     Review of Systems   Constitutional:  Negative for chills, fatigue, fever and unexpected weight change.   HENT:  Positive for congestion. Negative for postnasal drip and rhinorrhea.    Respiratory:  Positive for cough (denies hemoptysis.), shortness of breath and wheezing.    Cardiovascular:  Negative for chest pain and leg swelling.   All other systems reviewed and are negative.      Objective   Physical Exam  Vitals reviewed.   Constitutional:       Appearance: Normal appearance.   HENT:      Head: Normocephalic.   Cardiovascular:      Rate and Rhythm: Normal rate and regular rhythm.   Pulmonary:      Effort: Pulmonary effort is normal.      Breath  sounds: Normal breath sounds.   Skin:     General: Skin is warm and dry.   Neurological:      Mental Status: He is alert.       Assessment/Plan   Bronchial asthma  Allergic rhinitis  Former smoker  Elevated left hemidiaphragm   CLIVE, on CPAP  CHF  History of atrial fibrillation     Plan:    -PFTs done with no obstruction and moderate restriction. TLC 61. Referred to thoracic and general surgery.   -Continue Symbicort BID.   -Continue albuterol prn.   -IGE 77, RAST normal. Eos were 410.   -Continue Montelukast patient states he noted significant benefit with this in the past. He denies history of significant anxiety. Discussed potential side effects.   -He is a former smoker at 1 ppd x 20 years, quit 20 years ago.   -He had HRCT done February 2025 with some mild scarring vs atelectasis but no concern for ILD. He has elevation of left hemidiaphragm with basilar atelectasis. Discussed results at length, recommend sniff test at this time. This was done and showed diaphragm was paralyzed I placed referral to Dr. Fields who then placed referral to Dr. Wright for diaphragm pacer which he is scheduled for in August.   -He has CLIVE and is compliant with CPAP nightly.   -He follows closely with cardiology.     Overall his breathing has been improved on current regimen, will continue on this. He is following with general surgery for diaphragm pacer. I will see him back in 9 months. I instructed patient to call sooner if needed.

## 2025-07-10 NOTE — PATIENT INSTRUCTIONS
Continue Symbicort 2 puffs twice a day, everyday.  Continue on albuterol as needed.  Continue Montelukast (Singulair) at bedtime.  Call with any questions or concerns.  Follow up with me in 9 months.

## 2025-08-05 RX ORDER — TESTOSTERONE CYPIONATE 200 MG/ML
0.7 INJECTION, SOLUTION INTRAMUSCULAR 2 TIMES WEEKLY
COMMUNITY

## 2025-08-05 NOTE — PROGRESS NOTES
Pharmacy Medication History Review    Domingo Greer is a 64 y.o. male who is planned to be admitted for No Principal Problem: There is no principal problem currently on the Problem List. Please update the Problem List and refresh.. Pharmacy called the patient prior to their scheduled procedure and reviewed the patient's [id number] medications for accuracy.    Medications ADDED:  Testosterone cypionate 200mg/ml  Medications CHANGED:  none  Medications REMOVED:   none    Please review updated prior to admission medication list and comments regarding how patient may be taking medications differently by going to Admission tab --> Admission Orders --> Admit Orders / Review prior to admission medications.     Preferred pharmacy, last doses of medications, and allergies to be confirmed with patient by nursing the day of procedure.     Sources used to complete the med history include:  [facility]  Pharmacy dispense history  Patient Interview Good historian  Chart Review  Care Everywhere     Below are additional concerns with the patient's PTA list.  Patient states they are injecting 0.7ml of testosterone cypionate 200mg/ml twice weekly on Wednesdays and Sundays. There is no fill history to confirm, but per [place] shows L.F. 7-16-25 8ml/28d. Per [place], patient gets medication dispensed out of MD office    Cinthya Mejía CPhT  Please reach out via Secure Chat for questions

## 2025-08-20 ENCOUNTER — LAB (OUTPATIENT)
Dept: LAB | Facility: HOSPITAL | Age: 65
End: 2025-08-20
Payer: COMMERCIAL

## 2025-08-20 DIAGNOSIS — C61 MALIGNANT NEOPLASM OF PROSTATE (MULTI): ICD-10-CM

## 2025-08-20 PROCEDURE — 36415 COLL VENOUS BLD VENIPUNCTURE: CPT

## 2025-08-20 PROCEDURE — 84153 ASSAY OF PSA TOTAL: CPT | Mod: PORLAB

## 2025-08-21 DIAGNOSIS — I48.0 PAROXYSMAL ATRIAL FIBRILLATION (MULTI): ICD-10-CM

## 2025-08-21 DIAGNOSIS — I42.0 DILATED CARDIOMYOPATHY (MULTI): ICD-10-CM

## 2025-08-21 LAB — PSA SERPL-MCNC: 0.17 NG/ML

## 2025-08-21 RX ORDER — CARVEDILOL 25 MG/1
25 TABLET ORAL
Qty: 180 TABLET | Refills: 3 | Status: SHIPPED | OUTPATIENT
Start: 2025-08-21 | End: 2026-08-21

## 2025-08-24 DIAGNOSIS — I50.32 HEART FAILURE WITH IMPROVED EJECTION FRACTION (HFIMPEF): ICD-10-CM

## 2025-08-25 ENCOUNTER — APPOINTMENT (OUTPATIENT)
Dept: CARDIOLOGY | Facility: HOSPITAL | Age: 65
End: 2025-08-25
Payer: COMMERCIAL

## 2025-08-25 RX ORDER — FUROSEMIDE 40 MG/1
40 TABLET ORAL DAILY
Qty: 90 TABLET | Refills: 1 | Status: SHIPPED | OUTPATIENT
Start: 2025-08-25 | End: 2026-05-22

## 2025-09-04 ENCOUNTER — TELEMEDICINE (OUTPATIENT)
Dept: UROLOGY | Facility: HOSPITAL | Age: 65
End: 2025-09-04
Payer: COMMERCIAL

## 2025-09-04 DIAGNOSIS — C61 MALIGNANT NEOPLASM OF PROSTATE (MULTI): ICD-10-CM

## 2025-09-04 DIAGNOSIS — Z90.79 S/P PROSTATECTOMY: ICD-10-CM

## 2025-09-04 PROCEDURE — 99214 OFFICE O/P EST MOD 30 MIN: CPT | Performed by: UROLOGY

## 2026-04-09 ENCOUNTER — APPOINTMENT (OUTPATIENT)
Dept: PULMONOLOGY | Facility: HOSPITAL | Age: 66
End: 2026-04-09
Payer: COMMERCIAL

## (undated) DEVICE — MANIFOLD, 4 PORT NEPTUNE STANDARD

## (undated) DEVICE — DRESSING, ADHESIVE, ISLAND, TELFA, 2 X 3.75 IN, LF

## (undated) DEVICE — SUTURE, MONOCRYL, 4-0, 18 IN, PS2, UNDYED

## (undated) DEVICE — SPONGE, LAP, XRAY DECT, 18IN X 18IN, W/LOOP, STERILE

## (undated) DEVICE — SEAL, UNIVERSAL, 5-12MM

## (undated) DEVICE — COVER, TIP HOT SHEARS ENDOWRIST

## (undated) DEVICE — PREP TRAY, SKIN, DRY, W/GLOVES

## (undated) DEVICE — KIT, ROBOTIC, CUSTOM UHC

## (undated) DEVICE — APPLICATOR, CHLORAPREP, W/ORANGE TINT, 26ML

## (undated) DEVICE — SUTURE, V-LOC, 3-0, 9IN, CV-23, GREEN

## (undated) DEVICE — DRAPE, SHEET, ENDOSCOPY, GENERAL, FENESTRATED, ARMBOARD COVER, 98 X 123.5 IN, DISPOSABLE, LF, STERILE

## (undated) DEVICE — SCISSORS, MONOPOLAR, CURVED, 8MM

## (undated) DEVICE — PROBE HOLDER, URONAV BK 8808E/8818

## (undated) DEVICE — RETRIEVAL SYSTEM, MONARCH, 10MM DISP ENDOSCOPIC

## (undated) DEVICE — CLIPPER, SURGICAL BLADE ASSEMBLY, GENERAL PURPOSE, SINGLE USE

## (undated) DEVICE — TOWELS 4-PK

## (undated) DEVICE — DRIVER, NEEDLE LARGE, DAVINCI XI

## (undated) DEVICE — NEEDLE GUIDE, BIOPSY, ULTRASOUND, SINGLE, UA 1322-S

## (undated) DEVICE — RETRIEVAL SYSTEM, MONARCH INZII, 5MM

## (undated) DEVICE — SPONGE, LAP, XRAY DECT, SC+RFID, 4X18, STERILE

## (undated) DEVICE — SYRINGE, LUER LOCK, 12ML

## (undated) DEVICE — GLOVE, PROTEXIS PI CLASSIC, SZ-7.5, PF, LF

## (undated) DEVICE — TOWEL, SURGICAL, NEURO, O/R, 16 X 26, BLUE, STERILE

## (undated) DEVICE — COVER, BACK TABLE, 65 X 90, HVY REINFORCED

## (undated) DEVICE — SPONGE GAUZE, XRAY SC+RFID, 4X4 16 PLY, STERILE

## (undated) DEVICE — DRESSING, NON-ADHERENT, TELFA, OUCHLESS, 3 X 8 IN, STERILE

## (undated) DEVICE — NEEDLE, SPINAL, QUINCKE, LUER LOCK, 18 G X 6 IN

## (undated) DEVICE — PROBE COVER, ULTRASOUND 8818

## (undated) DEVICE — DRAPE, COLUMN, DAVINCI XI

## (undated) DEVICE — SPONGE, HEMOSTATIC, CELLULOSE, SURGICEL, NU-KNIT, 3 X 4 IN

## (undated) DEVICE — SUTURE, VICRYL, 0, 27 IN, UR-6, VIOLET

## (undated) DEVICE — SYRINGE, 60 CC, IRRIGATION, TOOMEY TIP

## (undated) DEVICE — EVACUATOR, WOUND, SUCTION, CLOSED, JACKSON-PRATT, 100 CC, SILICONE

## (undated) DEVICE — Device

## (undated) DEVICE — TUBING SET, TRI-LUMEN, FILTERED, F/AIRSEAL

## (undated) DEVICE — STATLOCK, FOLEY SWIVEL, SILICONE TRICOT

## (undated) DEVICE — DRAIN, WOUND, FLAT, HUBLESS, FULL LENGTH PERFORATION, 10 MM X 20 CM, SILICONE

## (undated) DEVICE — CATHETER, URETHRAL, FOLEY, 2 WAY, BARDEX IC, 20 FR, 5 CC, SILVER, LATEX

## (undated) DEVICE — OBTURATOR, BLADELESS , SU

## (undated) DEVICE — NEEDLE, INSUFFLATION, 14 G, 100 MM

## (undated) DEVICE — DRAPE, SHEET, UTILITY, NON ABSORBENT, 18 X 26 IN, LF

## (undated) DEVICE — POSITIONING, THE PINK PAD, PIGAZZI SYSTEM

## (undated) DEVICE — CONTAINER, SPECIMEN, 120 ML, STERILE

## (undated) DEVICE — GOWN, SURGICAL, SMARTGOWN, XLARGE, STERILE

## (undated) DEVICE — SUTURE, SILK, 2-0, FSL, BR, 30 IN, BLACK

## (undated) DEVICE — DRAPE, ARM XI

## (undated) DEVICE — CATHETER, URETHRAL, FOLEY, 2 WAY, BARDEX IC, 18 FR, 5 CC, SILVER, LATEX

## (undated) DEVICE — BAG, DRAIN METER, URINE, 350CC, LF

## (undated) DEVICE — ACCESS PORT, 12MM, 100MM LENGTH, LOW PROFILE W/BLADELESS OPTICAL TIP

## (undated) DEVICE — DRAPE, INCISE, ANTIMICROBIAL, IOBAN 2, LARGE, 17 X 23 IN, DISPOSABLE, STERILE

## (undated) DEVICE — CLIP, LIGATING, HEM-O-LOCK, MLX, LARGE, LF, PURPLE